# Patient Record
Sex: FEMALE | Race: BLACK OR AFRICAN AMERICAN | NOT HISPANIC OR LATINO | Employment: FULL TIME | URBAN - METROPOLITAN AREA
[De-identification: names, ages, dates, MRNs, and addresses within clinical notes are randomized per-mention and may not be internally consistent; named-entity substitution may affect disease eponyms.]

---

## 2017-01-24 ENCOUNTER — OFFICE VISIT (OUTPATIENT)
Dept: FAMILY MEDICINE | Facility: CLINIC | Age: 57
End: 2017-01-24
Payer: COMMERCIAL

## 2017-01-24 ENCOUNTER — LAB VISIT (OUTPATIENT)
Dept: LAB | Facility: HOSPITAL | Age: 57
End: 2017-01-24
Attending: FAMILY MEDICINE
Payer: COMMERCIAL

## 2017-01-24 VITALS
RESPIRATION RATE: 20 BRPM | HEART RATE: 119 BPM | SYSTOLIC BLOOD PRESSURE: 150 MMHG | HEIGHT: 63 IN | DIASTOLIC BLOOD PRESSURE: 86 MMHG | TEMPERATURE: 98 F | OXYGEN SATURATION: 97 % | BODY MASS INDEX: 42.15 KG/M2 | WEIGHT: 237.88 LBS

## 2017-01-24 DIAGNOSIS — I50.9 CHRONIC CONGESTIVE HEART FAILURE, UNSPECIFIED CONGESTIVE HEART FAILURE TYPE: ICD-10-CM

## 2017-01-24 DIAGNOSIS — M25.512 ACUTE PAIN OF LEFT SHOULDER: ICD-10-CM

## 2017-01-24 DIAGNOSIS — Z79.4 TYPE 2 DIABETES MELLITUS WITH RETINOPATHY WITHOUT MACULAR EDEMA, WITH LONG-TERM CURRENT USE OF INSULIN, UNSPECIFIED LATERALITY, UNSPECIFIED RETINOPATHY SEVERITY: Chronic | ICD-10-CM

## 2017-01-24 DIAGNOSIS — I10 ESSENTIAL HYPERTENSION: Primary | Chronic | ICD-10-CM

## 2017-01-24 DIAGNOSIS — E11.69 COMBINED HYPERLIPIDEMIA ASSOCIATED WITH TYPE 2 DIABETES MELLITUS: Chronic | ICD-10-CM

## 2017-01-24 DIAGNOSIS — E11.319 TYPE 2 DIABETES MELLITUS WITH RETINOPATHY WITHOUT MACULAR EDEMA, WITH LONG-TERM CURRENT USE OF INSULIN, UNSPECIFIED LATERALITY, UNSPECIFIED RETINOPATHY SEVERITY: Chronic | ICD-10-CM

## 2017-01-24 DIAGNOSIS — E78.2 COMBINED HYPERLIPIDEMIA ASSOCIATED WITH TYPE 2 DIABETES MELLITUS: Chronic | ICD-10-CM

## 2017-01-24 DIAGNOSIS — E87.6 HYPOKALEMIA: ICD-10-CM

## 2017-01-24 PROCEDURE — 99214 OFFICE O/P EST MOD 30 MIN: CPT | Mod: S$GLB,,, | Performed by: FAMILY MEDICINE

## 2017-01-24 PROCEDURE — 3077F SYST BP >= 140 MM HG: CPT | Mod: S$GLB,,, | Performed by: FAMILY MEDICINE

## 2017-01-24 PROCEDURE — 2022F DILAT RTA XM EVC RTNOPTHY: CPT | Mod: S$GLB,,, | Performed by: FAMILY MEDICINE

## 2017-01-24 PROCEDURE — 36415 COLL VENOUS BLD VENIPUNCTURE: CPT | Mod: PO

## 2017-01-24 PROCEDURE — 99999 PR PBB SHADOW E&M-EST. PATIENT-LVL III: CPT | Mod: PBBFAC,,, | Performed by: FAMILY MEDICINE

## 2017-01-24 PROCEDURE — 1159F MED LIST DOCD IN RCRD: CPT | Mod: S$GLB,,, | Performed by: FAMILY MEDICINE

## 2017-01-24 PROCEDURE — 83036 HEMOGLOBIN GLYCOSYLATED A1C: CPT

## 2017-01-24 PROCEDURE — 3046F HEMOGLOBIN A1C LEVEL >9.0%: CPT | Mod: S$GLB,,, | Performed by: FAMILY MEDICINE

## 2017-01-24 PROCEDURE — 3079F DIAST BP 80-89 MM HG: CPT | Mod: S$GLB,,, | Performed by: FAMILY MEDICINE

## 2017-01-24 PROCEDURE — 4010F ACE/ARB THERAPY RXD/TAKEN: CPT | Mod: S$GLB,,, | Performed by: FAMILY MEDICINE

## 2017-01-24 RX ORDER — AMLODIPINE BESYLATE 10 MG/1
10 TABLET ORAL DAILY
Qty: 30 TABLET | Refills: 5 | Status: CANCELLED | OUTPATIENT
Start: 2017-01-24 | End: 2017-07-23

## 2017-01-24 RX ORDER — FUROSEMIDE 40 MG/1
40 TABLET ORAL 2 TIMES DAILY
Qty: 60 TABLET | Refills: 5 | Status: SHIPPED | OUTPATIENT
Start: 2017-01-24 | End: 2017-04-27 | Stop reason: SDUPTHER

## 2017-01-24 RX ORDER — AMLODIPINE BESYLATE 10 MG/1
10 TABLET ORAL DAILY
Qty: 30 TABLET | Refills: 5 | Status: SHIPPED | OUTPATIENT
Start: 2017-01-24 | End: 2017-04-27 | Stop reason: SDUPTHER

## 2017-01-24 RX ORDER — LOSARTAN POTASSIUM 100 MG/1
100 TABLET ORAL DAILY
Qty: 30 TABLET | Refills: 5 | Status: SHIPPED | OUTPATIENT
Start: 2017-01-24 | End: 2017-01-24 | Stop reason: SDUPTHER

## 2017-01-24 RX ORDER — CLONIDINE HYDROCHLORIDE 0.1 MG/1
0.1 TABLET ORAL 2 TIMES DAILY
Qty: 60 TABLET | Refills: 5 | Status: SHIPPED | OUTPATIENT
Start: 2017-01-24 | End: 2017-04-27 | Stop reason: SDUPTHER

## 2017-01-24 RX ORDER — LOSARTAN POTASSIUM 100 MG/1
100 TABLET ORAL DAILY
Qty: 30 TABLET | Refills: 5 | Status: SHIPPED | OUTPATIENT
Start: 2017-01-24 | End: 2017-04-27 | Stop reason: SDUPTHER

## 2017-01-24 RX ORDER — ATORVASTATIN CALCIUM 40 MG/1
40 TABLET, FILM COATED ORAL DAILY
Qty: 30 TABLET | Refills: 2 | Status: SHIPPED | OUTPATIENT
Start: 2017-01-24 | End: 2017-04-27 | Stop reason: SDUPTHER

## 2017-01-24 RX ORDER — ATORVASTATIN CALCIUM 40 MG/1
TABLET, FILM COATED ORAL
COMMUNITY
Start: 2016-12-26 | End: 2017-01-24 | Stop reason: SDUPTHER

## 2017-01-24 RX ORDER — CARVEDILOL 6.25 MG/1
6.25 TABLET ORAL NIGHTLY
Qty: 30 TABLET | Refills: 5 | Status: SHIPPED | OUTPATIENT
Start: 2017-01-24 | End: 2017-04-27 | Stop reason: SDUPTHER

## 2017-01-24 RX ORDER — GABAPENTIN 800 MG/1
800 TABLET ORAL 2 TIMES DAILY
Qty: 60 TABLET | Refills: 0 | Status: SHIPPED | OUTPATIENT
Start: 2017-01-24 | End: 2017-02-27 | Stop reason: SDUPTHER

## 2017-01-24 RX ORDER — NORTRIPTYLINE HYDROCHLORIDE 25 MG/1
25 CAPSULE ORAL NIGHTLY
Qty: 30 CAPSULE | Refills: 5 | Status: SHIPPED | OUTPATIENT
Start: 2017-01-24 | End: 2017-04-27 | Stop reason: SDUPTHER

## 2017-01-24 RX ORDER — TRAMADOL HYDROCHLORIDE 50 MG/1
50 TABLET ORAL 2 TIMES DAILY PRN
Qty: 60 TABLET | Refills: 2 | Status: SHIPPED | OUTPATIENT
Start: 2017-01-24 | End: 2017-04-27 | Stop reason: SDUPTHER

## 2017-01-24 RX ORDER — POTASSIUM CHLORIDE 20 MEQ/1
20 TABLET, EXTENDED RELEASE ORAL 2 TIMES DAILY
Qty: 60 TABLET | Refills: 5 | Status: SHIPPED | OUTPATIENT
Start: 2017-01-24 | End: 2017-04-27 | Stop reason: SDUPTHER

## 2017-01-24 RX ORDER — FUROSEMIDE 40 MG/1
40 TABLET ORAL 2 TIMES DAILY
Qty: 60 TABLET | Refills: 5 | Status: SHIPPED | OUTPATIENT
Start: 2017-01-24 | End: 2017-01-24 | Stop reason: SDUPTHER

## 2017-01-24 NOTE — MR AVS SNAPSHOT
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, with long-term current use of insulin, unspecified laterality, unspecified retinopathy severity         Chronic congestive heart failure, unspecified congestive heart failure type         Type 2 diabetes mellitus, uncontrolled, with neuropathy         Acute pain of left shoulder         Hypokalemia         Combined hyperlipidemia associated with type 2 diabetes mellitus                To Do List           Goals (5 Years of Data)              8/3/16    3/3/16    9/10/15    HEMOGLOBIN A1C < 7.0   10.3  9.6  9.5      Follow-Up and Disposition     Return in about 2 weeks (around 2017) for Follow up.       These Medications        Disp Refills Start End    carvedilol (COREG) 6.25 MG tablet 30 tablet 5 2017     Take 1 tablet (6.25 mg total) by mouth every evening. - Oral    Pharmacy: [facility] SANZ [address] Ph #: [phone]       losartan (COZAAR) 100 MG tablet 30 tablet 5 2017     Take 1 tablet (100 mg total) by mouth once daily. - Oral    Pharmacy: [facility] RACHID SANZ [address] Ph #: [phone]       tramadol (ULTRAM) 50 mg tablet 60 tablet 2 2017     Take 1 tablet (50 mg total) by mouth 2 (two) times daily as needed. - Oral    Pharmacy: [facility] RACHID SANZ [address] Ph #: [phone]       cloNIDine (CATAPRES) 0.1 MG tablet 60 tablet 5 2017    Take 1 tablet (0.1 mg  total) by mouth 2 (two) times daily. - Oral    Pharmacy: [address] Ph #: [phone]       nortriptyline (PAMELOR) 25 MG capsule 30 capsule 5 1/24/2017 7/23/2017    Take 1 capsule (25 mg total) by mouth every evening. - Oral    Pharmacy: [address] Ph #: [phone]       gabapentin (NEURONTIN) 800 MG tablet 60 tablet 0 1/24/2017     Take 1 tablet (800 mg total) by mouth 2 (two) times daily. - Oral    Pharmacy: [address] Ph #: [phone]       potassium chloride SA (K-DUR,KLOR-CON) 20 MEQ tablet 60 tablet 5 1/24/2017 7/23/2017    Take 1 tablet (20 mEq total) by mouth 2 (two) times daily. - Oral    Pharmacy: [address] Ph #: [phone]       furosemide (LASIX) 40 MG tablet 60 tablet 5 1/24/2017     Take 1 tablet (40 mg total) by mouth 2 (two) times daily. - Oral    Pharmacy: [address] Ph #: [phone]       atorvastatin (LIPITOR) 40 MG tablet 30 tablet 2 1/24/2017     Take 1 tablet (40 mg total) by mouth once daily. - Oral    Pharmacy: [address] Ph #: [phone]       amlodipine (NORVASC) 10 MG tablet 30 tablet 5 1/24/2017 7/23/2017    Take 1 tablet (10 mg total) by mouth once daily. - Oral    Pharmacy: [address] Ph #: [phone]         [facility] On Call     [facility] On Call Nurse Care Line - 24/7 Assistance  Registered nurses in the [facility] On Call Center provide clinical advisement, health education, appointment booking, and other advisory services.  Call for this free service at [phone].             Medications           Message regarding Medications     Verify the changes and/or additions to your medication regime listed below are the same as discussed with your  clinician today.  If any of these changes or additions are incorrect, please notify your healthcare provider.        START taking these NEW medications        Refills    atorvastatin (LIPITOR) 40 MG tablet 2    Sig: Take 1 tablet (40 mg total) by mouth once daily.    Class: Normal    Route: Oral      CHANGE how you are taking these medications     Start Taking Instead of    gabapentin (NEURONTIN) 800 MG tablet gabapentin (NEURONTIN) 800 MG tablet    Dosage:  Take 1 tablet (800 mg total) by mouth 2 (two) times daily. Dosage:  TAKE ONE TABLET BY MOUTH TWICE DAILY    Reason for Change:  Reorder       STOP taking these medications     benzonatate (TESSALON) 200 MG capsule     budesonide-formoterol 160-4.5 mcg (SYMBICORT) 160-4.5 mcg/actuation HFAA Inhale 2 puffs into the lungs every 12 (twelve) hours.    ciprofloxacin HCl (CIPRO) 500 MG tablet     fluticasone-salmeterol 250-50 mcg/dose (ADVAIR DISKUS) 250-50 mcg/dose diskus inhaler Inhale 1 puff into the lungs 2 (two) times daily as needed.     insulin glargine (LANTUS SOLOSTAR) 100 unit/mL (3 mL) InPn pen Inject 80 Units into the skin every evening.    levalbuterol (XOPENEX) 1.25 mg/3 mL nebulizer solution Inhale into the lungs.    multivitamin (THERAGRAN) per tablet Take 1 tablet by mouth once daily.            Verify that the below list of medications is an accurate representation of the medications you are currently taking.  If none reported, the list may be blank. If incorrect, please contact your healthcare provider. Carry this list with you in case of emergency.           Current Medications     albuterol (PROVENTIL HFA/VENTOLIN HFA) 200 puff inhaler Inhale 2 puffs into the lungs every 6 (six) hours as needed.      amlodipine (NORVASC) 10 MG tablet Take 1 tablet (10 mg total) by mouth once daily.    ferrous sulfate 325 mg (65 mg iron) Tab Take 1 tablet by mouth once daily.     insulin aspart (NOVOLOG) 100 unit/mL injection Inject 12 Units into the skin 3 (three)  "times daily before meals.    insulin regular 100 unit/mL Inj injection Inject 25 Units into the skin as needed.    atorvastatin (LIPITOR) 40 MG tablet Take 1 tablet (40 mg total) by mouth once daily.    carvedilol (COREG) 6.25 MG tablet Take 1 tablet (6.25 mg total) by mouth every evening.    cloNIDine (CATAPRES) 0.1 MG tablet Take 1 tablet (0.1 mg total) by mouth 2 (two) times daily.    furosemide (LASIX) 40 MG tablet Take 1 tablet (40 mg total) by mouth 2 (two) times daily.    gabapentin (NEURONTIN) 800 MG tablet Take 1 tablet (800 mg total) by mouth 2 (two) times daily.    losartan (COZAAR) 100 MG tablet Take 1 tablet (100 mg total) by mouth once daily.    nortriptyline (PAMELOR) 25 MG capsule Take 1 capsule (25 mg total) by mouth every evening.    potassium chloride SA (K-DUR,KLOR-CON) 20 MEQ tablet Take 1 tablet (20 mEq total) by mouth 2 (two) times daily.    tramadol (ULTRAM) 50 mg tablet Take 1 tablet (50 mg total) by mouth 2 (two) times daily as needed.           Clinical Reference Information           Vital Signs - Last Recorded  Most recent update: 1/24/2017  3:12 PM by Ela Yo MA    BP Pulse Temp Resp Ht Wt    (!) 150/86 (BP Location: Left arm, Patient Position: Sitting, BP Method: Manual) (!) 119 98.2 °F (36.8 °C) (Oral) 20 5' 3" (1.6 m) 107.9 kg (237 lb 14 oz)    LMP SpO2 BMI          11/04/2013 97% 42.14 kg/m2        Blood Pressure          Most Recent Value    BP  (!)  150/86      Allergies as of 1/24/2017     Lisinopril      Immunizations Administered on Date of Encounter - 1/24/2017     None      Orders Placed During Today's Visit     Future Labs/Procedures Expected by Expires    Hemoglobin A1c  1/24/2017 3/25/2018      [facility] Sign-Up     Activating your [facility] account is as easy as 1-2-3!     1) Visit [email or web address], select Sign Up Now, enter this activation code and your date of birth, then select Next.  [email or web address]  Expires: 3/10/2017  3:35 PM      2) Create a username " and password to use when you visit [facility] in the future and select a security question in case you lose your password and select Next.    3) Enter your e-mail address and click Sign Up!    Additional Information  If you have questions, please e-mail [email or web address] or call [phone] to talk to our [facility] staff. Remember, [facility] is NOT to be used for urgent needs. For medical emergencies, dial 911.

## 2017-01-25 LAB
ESTIMATED AVG GLUCOSE: 269 MG/DL
HBA1C MFR BLD HPLC: 11 %

## 2017-01-25 NOTE — PROGRESS NOTES
Chief Complaint   Patient presents with    Establish Care    Hypertension    Medication Refill       HPI  Michelle Donohue is a 56 y.o. female with multiple medical diagnoses as listed in the medical history and problem list that presents for establishment of care . She has diabetes and hypertension. She has been having pains in her left shoulder and thinks this might be elevating her blood pressure. She has not checked her sugars in a while because she was taking tresiba and not getting improvement. She tries to avoid sugars in her diet. She is currently taking insulin. She does not exercise and she has been having trouble with her heart failure since her brother . She has managed this on her own by increasing her lasix as needed. She has some swelling in her right neck.    PAST MEDICAL HISTORY:  Past Medical History   Diagnosis Date    Asthma     Diabetes mellitus, type 2     Diabetic neuropathy     Diabetic retinopathy     Epiretinal membrane, left eye     Heart failure     Hyperlipidemia     Hypertension     Retinal detachment     Sickle cell trait     Vitreous hemorrhage of both eyes        PAST SURGICAL HISTORY:  Past Surgical History   Procedure Laterality Date    Breast lumpectomy       right breast    Btl      Panretinal photocoagulation       Both eyes    Avastin injection Bilateral     Cataract extraction w/  intraocular lens implant Left 3/25/2015     OS (Dr. Montes)    Cataract extraction w/  intraocular lens implant Right 6/17/15     OD ()       SOCIAL HISTORY:  Social History     Social History    Marital status: Single     Spouse name: N/A    Number of children: 2    Years of education: N/A     Occupational History          Social History Main Topics    Smoking status: Never Smoker    Smokeless tobacco: Never Used    Alcohol use No    Drug use: No    Sexual activity: Yes     Partners: Male      Comment: . 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     Other Topics Concern    Not on file     Social History Narrative       FAMILY HISTORY:  Family History   Problem Relation Age of Onset    Heart failure Father 60         Hyperlipidemia Father 60         Hypertension Father 60         Diabetes Father 60         Diabetes Mother     Hypertension Mother     Diabetes Brother     Heart failure Brother     Hyperlipidemia Brother     Hypertension Brother        ALLERGIES AND MEDICATIONS: updated and reviewed.  Review of patient's allergies indicates:   Allergen Reactions    Lisinopril Other (See Comments)     cough     Current Outpatient Prescriptions   Medication Sig Dispense Refill    albuterol (PROVENTIL HFA/VENTOLIN HFA) 200 puff inhaler Inhale 2 puffs into the lungs every 6 (six) hours as needed.        amlodipine (NORVASC) 10 MG tablet Take 1 tablet (10 mg total) by mouth once daily. 30 tablet 5    ferrous sulfate 325 mg (65 mg iron) Tab Take 1 tablet by mouth once daily.       insulin aspart (NOVOLOG) 100 unit/mL injection Inject 12 Units into the skin 3 (three) times daily before meals. 10.8 mL 11    insulin regular 100 unit/mL Inj injection Inject 25 Units into the skin as needed. 10 mL 5    atorvastatin (LIPITOR) 40 MG tablet Take 1 tablet (40 mg total) by mouth once daily. 30 tablet 2    carvedilol (COREG) 6.25 MG tablet Take 1 tablet (6.25 mg total) by mouth every evening. 30 tablet 5    cloNIDine (CATAPRES) 0.1 MG tablet Take 1 tablet (0.1 mg total) by mouth 2 (two) times daily. 60 tablet 5    furosemide (LASIX) 40 MG tablet Take 1 tablet (40 mg total) by mouth 2 (two) times daily. 60 tablet 5    gabapentin (NEURONTIN) 800 MG tablet Take 1 tablet (800 mg total) by mouth 2 (two) times daily. 60 tablet 0    losartan (COZAAR) 100 MG tablet Take 1 tablet (100 mg total) by mouth once daily. 30 tablet 5    nortriptyline (PAMELOR) 25 MG capsule Take 1 capsule (25 mg total) by mouth every evening. 30  "capsule 5    potassium chloride SA (K-DUR,KLOR-CON) 20 MEQ tablet Take 1 tablet (20 mEq total) by mouth 2 (two) times daily. 60 tablet 5    tramadol (ULTRAM) 50 mg tablet Take 1 tablet (50 mg total) by mouth 2 (two) times daily as needed. 60 tablet 2     Current Facility-Administered Medications   Medication Dose Route Frequency Provider Last Rate Last Dose    albuterol nebulizer solution 2.5 mg  2.5 mg Nebulization 1 time in Clinic/HOD RONY Woo MD           ROS  Review of Systems   Constitutional: Negative for chills, diaphoresis, fatigue, fever and unexpected weight change.   HENT: Negative for rhinorrhea, sinus pressure, sore throat and tinnitus.    Eyes: Negative for photophobia and visual disturbance.   Respiratory: Negative for cough, shortness of breath and wheezing.    Cardiovascular: Negative for chest pain and palpitations.   Gastrointestinal: Negative for abdominal pain, blood in stool, constipation, diarrhea, nausea and vomiting.   Genitourinary: Negative for dysuria, flank pain, frequency and vaginal discharge.   Musculoskeletal: Positive for arthralgias. Negative for joint swelling.   Skin: Negative for rash.   Neurological: Negative for speech difficulty, weakness, light-headedness and headaches.   Psychiatric/Behavioral: Negative for behavioral problems and dysphoric mood.       Physical Exam  Vitals:    01/24/17 1511   BP: (!) 150/86   Pulse: (!) 119   Resp: 20   Temp: 98.2 °F (36.8 °C)    Body mass index is 42.14 kg/(m^2).  Weight: 107.9 kg (237 lb 14 oz)   Height: 5' 3" (160 cm)     Physical Exam   Constitutional: She is oriented to person, place, and time. She appears well-developed and well-nourished. No distress.   HENT:   Area of firmness on right neck, no JVD present   Eyes: EOM are normal.   Neck: Neck supple.   Cardiovascular: Normal rate and regular rhythm.  Exam reveals no gallop and no friction rub.    No murmur heard.  Pulmonary/Chest: Effort normal and breath sounds " normal. No respiratory distress. She has no wheezes. She has no rales.   Lymphadenopathy:     She has no cervical adenopathy.   Neurological: She is alert and oriented to person, place, and time.   Skin: Skin is warm and dry. No rash noted.   Psychiatric: She has a normal mood and affect. Her behavior is normal.   Nursing note and vitals reviewed.      Health Maintenance       Date Due Completion Date    Fecal Occult Blood Test (FOBT) 1960 ---    Pneumococcal PCV13 (High Risk) (1 - PCV13 Required) 2/27/1961 ---    TETANUS VACCINE 2/27/1979 ---    Pneumococcal PPSV23 (High Risk) (1) 2/27/1979 ---    Pap Smear 2/27/1982 ---    Mammogram 8/19/2015 8/19/2014    Hemoglobin A1c 11/3/2016 8/3/2016    Eye Exam 2/10/2017 2/10/2016 (Done)    Override on 2/10/2016: Done    Foot Exam 8/3/2017 8/3/2016    Lipid Panel 8/3/2017 8/3/2016    Urine Microalbumin 8/4/2017 8/4/2016    Colonoscopy 8/13/2024 8/13/2014 (Declined)    Override on 8/13/2014: Declined            ASSESSMENT     1. Essential hypertension    2. 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 Chronic congestive heart failure, unspecified congestive heart failure type    4. Type 2 diabetes mellitus, uncontrolled, with neuropathy    5. Acute pain of left shoulder    6. Hypokalemia    7. Combined hyperlipidemia associated with type 2 diabetes mellitus        PLAN:     Essential hypertension  -     carvedilol (COREG) 6.25 MG tablet; Take 1 tablet (6.25 mg total) by mouth every evening.  Dispense: 30 tablet; Refill: 5  -     Discontinue: losartan (COZAAR) 100 MG tablet; Take 1 tablet (100 mg total) by mouth once daily.  Dispense: 30 tablet; Refill: 5  -     cloNIDine (CATAPRES) 0.1 MG tablet; Take 1 tablet (0.1 mg total) by mouth 2 (two) times daily.  Dispense: 60 tablet; Refill: 5  -     amlodipine (NORVASC) 10 MG tablet; Take 1 tablet (10 mg  total) by mouth once daily.  Dispense: 30 tablet; Refill: 5  -     losartan (COZAAR) 100 MG tablet; Take 1 tablet (100 mg total) by mouth once daily.  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  Dispense: 30 tablet; Refill: 5  -     Discontinue: furosemide (LASIX) 40 MG tablet; Take 1 tablet (40 mg total) by mouth 2 (two) times daily.  Dispense: 60 tablet; Refill: 5  -     furosemide (LASIX) 40 MG tablet; Take 1 tablet (40 mg total) by mouth 2 (two) times daily.  Dispense: 60 tablet; Refill: 5    Type 2 diabetes mellitus, uncontrolled, with neuropathy  -     tramadol (ULTRAM) 50 mg tablet; Take 1 tablet (50 mg total) by mouth 2 (two) times daily as needed.  Dispense: 60 tablet; Refill: 2  -     nortriptyline (PAMELOR) 25 MG capsule; Take 1 capsule (25 mg total) by mouth every evening.  Dispense: 30 capsule; Refill: 5  -     gabapentin (NEURONTIN) 800 MG tablet; Take 1 tablet (800 mg total) by mouth 2 (two) times daily.  Dispense: 60 tablet; Refill: 0    Acute pain of left shoulder  -     tramadol (ULTRAM) 50 mg tablet; Take 1 tablet (50 mg total) by mouth 2 (two) times daily as needed.  Dispense: 60 tablet; Refill: 2    Hypokalemia  -     potassium chloride SA (K-DUR,KLOR-CON) 20 MEQ tablet; Take 1 tablet (20 mEq total) by mouth 2 (two) times daily.  Dispense: 60 tablet; Refill: 5    Combined hyperlipidemia associated with type 2 diabetes mellitus      Recheck A1C, begin checking sugars once daily may get nutrition consult  Will recheck BP at next visit, avoid salt, begin exercising 30 min three to five times weekly  Continue to monitor neck  swelling for JVD, none noted today, possibly LAD        Rosalie Brady MD  01/25/2017 1:12 PM        Return in about 2 weeks (around 2/7/2017) for Follow up.

## 2017-03-01 RX ORDER — GABAPENTIN 800 MG/1
TABLET ORAL
Qty: 60 TABLET | Refills: 0 | Status: SHIPPED | OUTPATIENT
Start: 2017-03-01 | End: 2017-03-26 | Stop reason: SDUPTHER

## 2017-03-27 RX ORDER — GABAPENTIN 800 MG/1
TABLET ORAL
Qty: 60 TABLET | Refills: 0 | Status: SHIPPED | OUTPATIENT
Start: 2017-03-27 | End: 2017-04-27 | Stop reason: SDUPTHER

## 2017-03-27 NOTE — TELEPHONE ENCOUNTER
Please make her an appt as she is overdue for follow up for diabetes and labwork that has already been ordered    I can give a month supply but she is overdue for a visit

## 2017-04-12 ENCOUNTER — PATIENT OUTREACH (OUTPATIENT)
Dept: ADMINISTRATIVE | Facility: HOSPITAL | Age: 57
End: 2017-04-12

## 2017-04-12 NOTE — PROGRESS NOTES
Left message for patient to call our office.    This call is in regards to scheduling the patient w/ Endocrinology NP Patricia Alexandra.

## 2017-04-27 ENCOUNTER — PATIENT MESSAGE (OUTPATIENT)
Dept: FAMILY MEDICINE | Facility: CLINIC | Age: 57
End: 2017-04-27

## 2017-04-27 ENCOUNTER — OFFICE VISIT (OUTPATIENT)
Dept: FAMILY MEDICINE | Facility: CLINIC | Age: 57
End: 2017-04-27
Payer: COMMERCIAL

## 2017-04-27 ENCOUNTER — HOSPITAL ENCOUNTER (OUTPATIENT)
Dept: RADIOLOGY | Facility: HOSPITAL | Age: 57
Discharge: HOME | End: 2017-04-27
Attending: FAMILY MEDICINE
Payer: COMMERCIAL

## 2017-04-27 VITALS
HEART RATE: 110 BPM | OXYGEN SATURATION: 97 % | RESPIRATION RATE: 20 BRPM | TEMPERATURE: 98 F | SYSTOLIC BLOOD PRESSURE: 120 MMHG | HEIGHT: 63 IN | BODY MASS INDEX: 42.09 KG/M2 | DIASTOLIC BLOOD PRESSURE: 66 MMHG | WEIGHT: 237.56 LBS

## 2017-04-27 DIAGNOSIS — I10 ESSENTIAL HYPERTENSION: Chronic | ICD-10-CM

## 2017-04-27 DIAGNOSIS — M79.604 RIGHT LEG PAIN: ICD-10-CM
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
## 2017-04-27 DIAGNOSIS — E87.6 HYPOKALEMIA: ICD-10-CM

## 2017-04-27 DIAGNOSIS — E11.65 UNCONTROLLED TYPE 2 DIABETES MELLITUS WITH STAGE 3 CHRONIC KIDNEY DISEASE, UNSPECIFIED LONG TERM INSULIN USE STATUS: ICD-10-CM

## 2017-04-27 DIAGNOSIS — N18.3 UNCONTROLLED TYPE 2 DIABETES MELLITUS WITH STAGE 3 CHRONIC KIDNEY DISEASE, UNSPECIFIED LONG TERM INSULIN USE STATUS: ICD-10-CM
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
## 2017-04-27 DIAGNOSIS — E11.22 UNCONTROLLED TYPE 2 DIABETES MELLITUS WITH STAGE 3 CHRONIC KIDNEY DISEASE, UNSPECIFIED LONG TERM INSULIN USE STATUS: ICD-10-CM

## 2017-04-27 DIAGNOSIS — Z79.4 INSULIN LONG-TERM USE: Chronic | ICD-10-CM

## 2017-04-27 DIAGNOSIS — T14.8XXA SKIN ABRASION: ICD-10-CM

## 2017-04-27 DIAGNOSIS — M25.512 ACUTE PAIN OF LEFT SHOULDER: ICD-10-CM

## 2017-04-27 DIAGNOSIS — E11.65 UNCONTROLLED TYPE 2 DIABETES MELLITUS WITH COMPLICATION, UNSPECIFIED LONG TERM INSULIN USE STATUS: ICD-10-CM

## 2017-04-27 DIAGNOSIS — E11.8 UNCONTROLLED TYPE 2 DIABETES MELLITUS WITH COMPLICATION, UNSPECIFIED LONG TERM INSULIN USE STATUS: ICD-10-CM

## 2017-04-27 DIAGNOSIS — I50.9 CHRONIC CONGESTIVE HEART FAILURE, UNSPECIFIED CONGESTIVE HEART FAILURE TYPE: ICD-10-CM

## 2017-04-27 PROCEDURE — 3074F SYST BP LT 130 MM HG: CPT | Mod: S$GLB,,, | Performed by: FAMILY MEDICINE

## 2017-04-27 PROCEDURE — 3078F DIAST BP <80 MM HG: CPT | Mod: S$GLB,,, | Performed by: FAMILY MEDICINE

## 2017-04-27 PROCEDURE — 99999 PR PBB SHADOW E&M-EST. PATIENT-LVL V: CPT | Mod: PBBFAC,,, | Performed by: FAMILY MEDICINE

## 2017-04-27 PROCEDURE — 99214 OFFICE O/P EST MOD 30 MIN: CPT | Mod: S$GLB,,, | Performed by: FAMILY MEDICINE

## 2017-04-27 PROCEDURE — 4010F ACE/ARB THERAPY RXD/TAKEN: CPT | Mod: S$GLB,,, | Performed by: FAMILY MEDICINE

## 2017-04-27 PROCEDURE — 73590 X-RAY EXAM OF LOWER LEG: CPT | Mod: 26,RT,, | Performed by: RADIOLOGY

## 2017-04-27 PROCEDURE — 1160F RVW MEDS BY RX/DR IN RCRD: CPT | Mod: S$GLB,,, | Performed by: FAMILY MEDICINE

## 2017-04-27 PROCEDURE — 73590 X-RAY EXAM OF LOWER LEG: CPT | Mod: TC,PO,RT

## 2017-04-27 PROCEDURE — 3046F HEMOGLOBIN A1C LEVEL >9.0%: CPT | Mod: S$GLB,,, | Performed by: FAMILY MEDICINE

## 2017-04-27 RX ORDER — NORTRIPTYLINE HYDROCHLORIDE 25 MG/1
25 CAPSULE ORAL NIGHTLY
Qty: 30 CAPSULE | Refills: 5 | Status: SHIPPED | OUTPATIENT
Start: 2017-04-27 | End: 2017-10-25 | Stop reason: SDUPTHER

## 2017-04-27 RX ORDER — TRAMADOL HYDROCHLORIDE 50 MG/1
50 TABLET ORAL 2 TIMES DAILY PRN
Qty: 60 TABLET | Refills: 2 | Status: SHIPPED | OUTPATIENT
Start: 2017-04-27 | End: 2017-08-30 | Stop reason: SDUPTHER

## 2017-04-27 RX ORDER — CARVEDILOL 6.25 MG/1
6.25 TABLET ORAL NIGHTLY
Qty: 30 TABLET | Refills: 5 | Status: SHIPPED | OUTPATIENT
Start: 2017-04-27 | End: 2017-10-25 | Stop reason: SDUPTHER

## 2017-04-27 RX ORDER — INSULIN ASPART 100 [IU]/ML
12 INJECTION, SOLUTION INTRAVENOUS; SUBCUTANEOUS
Qty: 10.8 ML | Refills: 11 | Status: SHIPPED | OUTPATIENT
Start: 2017-04-27 | End: 2018-03-09 | Stop reason: ALTCHOICE

## 2017-04-27 RX ORDER — FUROSEMIDE 40 MG/1
40 TABLET ORAL 2 TIMES DAILY
Qty: 60 TABLET | Refills: 5 | Status: SHIPPED | OUTPATIENT
Start: 2017-04-27 | End: 2017-10-25 | Stop reason: SDUPTHER

## 2017-04-27 RX ORDER — AMLODIPINE BESYLATE 10 MG/1
10 TABLET ORAL DAILY
Qty: 30 TABLET | Refills: 5 | Status: SHIPPED | OUTPATIENT
Start: 2017-04-27 | End: 2017-10-25 | Stop reason: SDUPTHER

## 2017-04-27 RX ORDER — MUPIROCIN 20 MG/G
OINTMENT TOPICAL 3 TIMES DAILY
Qty: 22 G | Refills: 0 | Status: SHIPPED | OUTPATIENT
Start: 2017-04-27 | End: 2017-08-30

## 2017-04-27 RX ORDER — FERROUS SULFATE 325(65) MG
1 TABLET ORAL DAILY
Qty: 30 TABLET | Refills: 0 | Status: SHIPPED | OUTPATIENT
Start: 2017-04-27 | End: 2019-02-01

## 2017-04-27 RX ORDER — ATORVASTATIN CALCIUM 40 MG/1
40 TABLET, FILM COATED ORAL DAILY
Qty: 30 TABLET | Refills: 5 | Status: SHIPPED | OUTPATIENT
Start: 2017-04-27 | End: 2017-10-25 | Stop reason: SDUPTHER

## 2017-04-27 RX ORDER — GABAPENTIN 800 MG/1
800 TABLET ORAL 2 TIMES DAILY
Qty: 60 TABLET | Refills: 5 | Status: SHIPPED | OUTPATIENT
Start: 2017-04-27 | End: 2017-10-25 | Stop reason: SDUPTHER

## 2017-04-27 RX ORDER — CLONIDINE HYDROCHLORIDE 0.1 MG/1
0.1 TABLET ORAL 2 TIMES DAILY
Qty: 60 TABLET | Refills: 5 | Status: SHIPPED | OUTPATIENT
Start: 2017-04-27 | End: 2018-05-09 | Stop reason: SDUPTHER

## 2017-04-27 RX ORDER — POTASSIUM CHLORIDE 20 MEQ/1
20 TABLET, EXTENDED RELEASE ORAL 2 TIMES DAILY
Qty: 60 TABLET | Refills: 5 | Status: SHIPPED | OUTPATIENT
Start: 2017-04-27 | End: 2017-10-25 | Stop reason: SDUPTHER

## 2017-04-27 RX ORDER — LOSARTAN POTASSIUM 100 MG/1
100 TABLET ORAL DAILY
Qty: 30 TABLET | Refills: 5 | Status: SHIPPED | OUTPATIENT
Start: 2017-04-27 | End: 2017-10-25 | Stop reason: SDUPTHER

## 2017-04-27 NOTE — MR AVS SNAPSHOT
[facility]  [address]  [place] [address]  Phone: [phone]  Fax: [phone]                  Michelle Donohue   2017 10:20 AM   Office Visit    Description:  Female : 1960   Provider:  Rosalie Brady MD   Department:  [facility]           Reason for Visit     Fall     Diabetes           Diagnoses this Visit        Comments    Fall, initial encounter    -  Primary     Right leg pain         Essential hypertension         Chronic congestive heart failure, unspecified congestive heart failure type         Type 2 diabetes mellitus, uncontrolled, with neuropathy         Uncontrolled type 2 diabetes mellitus with complication, unspecified long term insulin use status         Uncontrolled type 2 diabetes mellitus with stage 3 chronic kidney disease, unspecified long term insulin use status         Insulin long-term use         Hypokalemia         Acute pain of left shoulder         Skin abrasion                To Do List           Goals (5 Years of Data)              1/24/17    8/3/16    3/3/16    HEMOGLOBIN A1C < 7.0   11.0  10.3  9.6      Follow-Up and Disposition     Return for Follow up.       These Medications        Disp Refills Start End    cloNIDine (CATAPRES) 0.1 MG tablet 60 tablet 5 2017 10/24/2017    Take 1 tablet (0.1 mg total) by mouth 2 (two) times daily. - Oral    Pharmacy: [facility] [address] [place] [address] [address] Ph #: [phone]       amlodipine (NORVASC) 10 MG tablet 30 tablet 5 2017 10/24/2017    Take 1 tablet (10 mg total) by mouth once daily. - Oral    Pharmacy: [facility] [place] [address] Ph #: [phone]       atorvastatin (LIPITOR) 40 MG tablet 30 tablet 5 2017     Take 1 tablet (40 mg total) by mouth once daily. - Oral    Pharmacy: [facility] [place] [address] Ph #: [phone]       carvedilol (COREG) 6.25 MG tablet 30 tablet 5  4/27/2017     Take 1 tablet (6.25 mg total) by mouth every evening. - Oral    Pharmacy: [address] Ph #: [phone]       ferrous sulfate 325 mg (65 mg iron) Tab tablet 30 tablet 0 4/27/2017     Take 1 tablet (325 mg total) by mouth once daily. - Oral    Pharmacy: [address] Ph #: [phone]       furosemide (LASIX) 40 MG tablet 60 tablet 5 4/27/2017     Take 1 tablet (40 mg total) by mouth 2 (two) times daily. - Oral    Pharmacy: [address] Ph #: [phone]       gabapentin (NEURONTIN) 800 MG tablet 60 tablet 5 4/27/2017     Take 1 tablet (800 mg total) by mouth 2 (two) times daily. - Oral    Pharmacy: [address] Ph #: [phone]       insulin aspart (NOVOLOG) 100 unit/mL injection 10.8 mL 11 4/27/2017 4/27/2018    Inject 12 Units into the skin 3 (three) times daily before meals. - Subcutaneous    Pharmacy: [address] Ph #: [phone]       insulin regular 100 unit/mL Inj injection 10 mL 5 4/27/2017     Inject 25 Units into the skin as needed. - Subcutaneous    Pharmacy: [address] Ph #: [phone]       losartan (COZAAR) 100 MG tablet 30 tablet 5 4/27/2017     Take 1 tablet (100 mg total) by mouth once daily. - Oral    Pharmacy: [address] Ph #: [phone]       nortriptyline (PAMELOR) 25 MG capsule 30 capsule 5 4/27/2017 10/24/2017    Take 1 capsule (25 mg total) by mouth every evening. - Oral    Pharmacy: [address] Ph #: [phone]       potassium chloride SA (K-DUR,KLOR-CON) 20 MEQ tablet 60 tablet 5 4/27/2017 10/24/2017    Take 1 tablet (20 mEq total) by mouth 2 (two) times daily. - Oral    Pharmacy: [id number] [address]  [place] [address] Ph #: [phone]       tramadol (ULTRAM) 50 mg tablet 60 tablet 2 4/27/2017     Take 1 tablet (50 mg total) by mouth 2 (two) times daily as needed. - Oral    Pharmacy: [facility] [place]  [place]  [address] [place] [address] Ph #: [phone]       mupirocin (BACTROBAN) 2 % ointment 22 g 0 4/27/2017     Apply topically 3 (three) times daily. - Topical (Top)    [facility] [place] [address] [place] [address] Ph #: [phone]         [facility]     [facility] - 24/7 Assistance  Unless otherwise directed by your provider, please contact [facility], our nurse care line that is available for 24/7 assistance.     Registered nurses in the [facility] provide: appointment scheduling, clinical advisement, health education, and other advisory services.  Call: [phone] (toll free)               Medications           Message regarding Medications     Verify the changes and/or additions to your medication regime listed below are the same as discussed with your clinician today.  If any of these changes or additions are incorrect, please notify your healthcare provider.        START taking these NEW medications        Refills    mupirocin (BACTROBAN) 2 % ointment 0    Sig: Apply topically 3 (three) times daily.    Class: Normal    Route: Topical (Top)      CHANGE how you are taking these medications     Start Taking Instead of    ferrous sulfate 325 mg (65 mg iron) Tab tablet ferrous sulfate 325 mg (65 mg iron) Tab    Dosage:  Take 1 tablet (325 mg total) by mouth once daily. Dosage:  Take 1 tablet by mouth once daily.     Reason for Change:  Reorder     gabapentin (NEURONTIN) 800 MG tablet gabapentin (NEURONTIN) 800 MG tablet    Dosage:  Take 1 tablet (800 mg total) by mouth 2 (two) times daily. Dosage:  TAKE ONE TABLET BY MOUTH TWICE DAILY    Reason for Change:  Reorder            Verify that the below list of medications  "is an accurate representation of the medications you are currently taking.  If none reported, the list may be blank. If incorrect, please contact your healthcare provider. Carry this list with you in case of emergency.           Current Medications     albuterol (PROVENTIL HFA/VENTOLIN HFA) 200 puff inhaler Inhale 2 puffs into the lungs every 6 (six) hours as needed.      amlodipine (NORVASC) 10 MG tablet Take 1 tablet (10 mg total) by mouth once daily.    atorvastatin (LIPITOR) 40 MG tablet Take 1 tablet (40 mg total) by mouth once daily.    carvedilol (COREG) 6.25 MG tablet Take 1 tablet (6.25 mg total) by mouth every evening.    ferrous sulfate 325 mg (65 mg iron) Tab tablet Take 1 tablet (325 mg total) by mouth once daily.    furosemide (LASIX) 40 MG tablet Take 1 tablet (40 mg total) by mouth 2 (two) times daily.    gabapentin (NEURONTIN) 800 MG tablet Take 1 tablet (800 mg total) by mouth 2 (two) times daily.    insulin aspart (NOVOLOG) 100 unit/mL injection Inject 12 Units into the skin 3 (three) times daily before meals.    losartan (COZAAR) 100 MG tablet Take 1 tablet (100 mg total) by mouth once daily.    nortriptyline (PAMELOR) 25 MG capsule Take 1 capsule (25 mg total) by mouth every evening.    potassium chloride SA (K-DUR,KLOR-CON) 20 MEQ tablet Take 1 tablet (20 mEq total) by mouth 2 (two) times daily.    tramadol (ULTRAM) 50 mg tablet Take 1 tablet (50 mg total) by mouth 2 (two) times daily as needed.    cloNIDine (CATAPRES) 0.1 MG tablet Take 1 tablet (0.1 mg total) by mouth 2 (two) times daily.    insulin regular 100 unit/mL Inj injection Inject 25 Units into the skin as needed.    mupirocin (BACTROBAN) 2 % ointment Apply topically 3 (three) times daily.           Clinical Reference Information           Your Vitals Were     BP Pulse Temp Resp Height Weight    120/66 110 98 °F (36.7 °C) (Oral) 20 5' 3" (1.6 m) 107.7 kg (237 lb 8.7 oz)    Last Period SpO2 BMI          11/04/2013 97% 42.08 kg/m2      "   Blood Pressure          Most Recent Value    BP  120/66      Allergies as of 4/27/2017     Lisinopril      Immunizations Administered on Date of Encounter - 4/27/2017     None      Orders Placed During Today's Visit      Normal Orders This Visit    Ambulatory consult to Endocrinology     Future Labs/Procedures Expected by Expires    Hemoglobin A1c  4/27/2017 4/27/2018    X-Ray Tibia Fibula 2 View Right  4/27/2017 4/27/2018      Language Assistance Services     ATTENTION: Language assistance services are available, free of charge. Please call [phone].      ATENCIÓN: Si habla español, tiene a portillo disposición servicios gratuitos de asistencia lingüística. Llame al [phone].     CHÚ Ý: N?u b?n nói Ti?ng Vi?t, có các d?ch v? h? tr? ngôn ng? mi?n phí dành cho b?n. G?i s? [phone].         [facility] complies with applicable Federal civil rights laws and does not discriminate on the basis of race, color, national origin, age, disability, or sex.

## 2017-04-27 NOTE — PROGRESS NOTES
Chief Complaint   Patient presents with    Fall     Tue    Diabetes       HPI  Michelle Donohue is a 57 y.o. female with multiple medical diagnoses as listed in the medical history and problem list that presents for evaluation after a fall that occurred two days ago. She was reaching for her insulin pen when she fell off her bed and her right leg went through her floor and was lodged there until her children got her out. She has superficial wounds on her leg that are painful and her leg is painful with walking. Her landlord has fixed her floor but she is still scared to move around the room.     PAST MEDICAL HISTORY:  Past Medical History:   Diagnosis Date    Asthma     Diabetes mellitus, type 2     Diabetic neuropathy     Diabetic retinopathy     Epiretinal membrane, left eye     Heart failure     Hyperlipidemia     Hypertension     Retinal detachment     Sickle cell trait     Vitreous hemorrhage of both eyes        PAST SURGICAL HISTORY:  Past Surgical History:   Procedure Laterality Date    Avastin Injection Bilateral     BREAST LUMPECTOMY      right breast    BTL      CATARACT EXTRACTION W/  INTRAOCULAR LENS IMPLANT Left 3/25/2015    OS (Dr. Montes)    CATARACT EXTRACTION W/  INTRAOCULAR LENS IMPLANT Right 6/17/15    OD ()    PANRETINAL PHOTOCOAGULATION      Both eyes       SOCIAL HISTORY:  Social History     Social History    Marital status: Single     Spouse name: N/A    Number of children: 2    Years of education: N/A     Occupational History          Social History Main Topics    Smoking status: Never Smoker    Smokeless tobacco: Never Used    Alcohol use No    Drug use: No    Sexual activity: Yes     Partners: Male      Comment: . 2 children. works as a .     Other Topics Concern    Not on file     Social History Narrative       FAMILY HISTORY:  Family History   Problem Relation Age of Onset    Heart failure Father 60          Hyperlipidemia Father 60         Hypertension Father 60         Diabetes Father 60         Diabetes Mother     Hypertension Mother     Diabetes Brother     Heart failure Brother     Hyperlipidemia Brother     Hypertension Brother        ALLERGIES AND MEDICATIONS: updated and reviewed.  Review of patient's allergies indicates:   Allergen Reactions    Lisinopril Other (See Comments)     cough     Current Outpatient Prescriptions   Medication Sig Dispense Refill    albuterol (PROVENTIL HFA/VENTOLIN HFA) 200 puff inhaler Inhale 2 puffs into the lungs every 6 (six) hours as needed.        amlodipine (NORVASC) 10 MG tablet Take 1 tablet (10 mg total) by mouth once daily. 30 tablet 5    atorvastatin (LIPITOR) 40 MG tablet Take 1 tablet (40 mg total) by mouth once daily. 30 tablet 5    carvedilol (COREG) 6.25 MG tablet Take 1 tablet (6.25 mg total) by mouth every evening. 30 tablet 5    ferrous sulfate 325 mg (65 mg iron) Tab tablet Take 1 tablet (325 mg total) by mouth once daily. 30 tablet 0    furosemide (LASIX) 40 MG tablet Take 1 tablet (40 mg total) by mouth 2 (two) times daily. 60 tablet 5    gabapentin (NEURONTIN) 800 MG tablet Take 1 tablet (800 mg total) by mouth 2 (two) times daily. 60 tablet 5    insulin aspart (NOVOLOG) 100 unit/mL injection Inject 12 Units into the skin 3 (three) times daily before meals. 10.8 mL 11    losartan (COZAAR) 100 MG tablet Take 1 tablet (100 mg total) by mouth once daily. 30 tablet 5    nortriptyline (PAMELOR) 25 MG capsule Take 1 capsule (25 mg total) by mouth every evening. 30 capsule 5    potassium chloride SA (K-DUR,KLOR-CON) 20 MEQ tablet Take 1 tablet (20 mEq total) by mouth 2 (two) times daily. 60 tablet 5    tramadol (ULTRAM) 50 mg tablet Take 1 tablet (50 mg total) by mouth 2 (two) times daily as needed. 60 tablet 2    cloNIDine (CATAPRES) 0.1 MG tablet Take 1 tablet (0.1 mg total) by mouth 2 (two) times daily. 60 tablet 5  "   insulin regular 100 unit/mL Inj injection Inject 25 Units into the skin as needed. 10 mL 5    mupirocin (BACTROBAN) 2 % ointment Apply topically 3 (three) times daily. 22 g 0     Current Facility-Administered Medications   Medication Dose Route Frequency Provider Last Rate Last Dose    albuterol nebulizer solution 2.5 mg  2.5 mg Nebulization 1 time in Clinic/HOD RONY Woo MD           ROS  Review of Systems   Constitutional: Negative for chills, diaphoresis, fatigue, fever and unexpected weight change.   HENT: Negative for rhinorrhea, sinus pressure, sore throat and tinnitus.    Eyes: Negative for photophobia and visual disturbance.   Respiratory: Negative for cough, shortness of breath and wheezing.    Cardiovascular: Negative for chest pain and palpitations.   Gastrointestinal: Negative for abdominal pain, blood in stool, constipation, diarrhea, nausea and vomiting.   Genitourinary: Negative for dysuria, flank pain, frequency and vaginal discharge.   Musculoskeletal: Positive for arthralgias and gait problem. Negative for joint swelling.   Skin: Positive for wound. Negative for rash.   Neurological: Negative for speech difficulty, weakness, light-headedness and headaches.   Psychiatric/Behavioral: Negative for behavioral problems and dysphoric mood.       Physical Exam  Vitals:    04/27/17 1022   BP: 120/66   Pulse: 110   Resp: 20   Temp: 98 °F (36.7 °C)   TempSrc: Oral   SpO2: 97%   Weight: 107.7 kg (237 lb 8.7 oz)   Height: 5' 3" (1.6 m)    Body mass index is 42.08 kg/(m^2).  Weight: 107.7 kg (237 lb 8.7 oz)   Height: 5' 3" (160 cm)     Physical Exam   Constitutional: She is oriented to person, place, and time. She appears well-developed and well-nourished. No distress.   HENT:   Head: Normocephalic and atraumatic.   Eyes: EOM are normal.   Neck: Neck supple.   Cardiovascular: Normal rate and regular rhythm.  Exam reveals no gallop and no friction rub.    No murmur heard.  Pulmonary/Chest: " Effort normal and breath sounds normal. No respiratory distress. She has no wheezes. She has no rales.   Musculoskeletal:   Right lower ext-sensation intact to light touch, edema present, pain over anterior tibia and lateral tibia   Neurological: She is alert and oriented to person, place, and time.   Skin: Skin is warm and dry. No rash noted. There is erythema.   Superficial abrasions below right knee and right shin   Psychiatric: She has a normal mood and affect. Her behavior is normal.   Nursing note and vitals reviewed.      Health Maintenance       Date Due Completion Date    Fecal Occult Blood Test (FOBT) 1960 ---    Pneumococcal PCV13 (High Risk) (1 - PCV13 Required) 2/27/1961 ---    Pneumococcal PPSV23 (High Risk) (1) 2/27/1979 ---    Pap Smear 2/27/1982 ---    Mammogram 8/19/2015 8/19/2014    Eye Exam 2/10/2017 2/10/2016 (Done)    Override on 2/10/2016: Done    Hemoglobin A1c 4/24/2017 1/24/2017    Foot Exam 8/3/2017 8/3/2016    Lipid Panel 8/3/2017 8/3/2016    Urine Microalbumin 8/4/2017 8/4/2016    Colonoscopy 8/13/2024 8/13/2014 (Declined)    Override on 8/13/2014: Declined    TETANUS VACCINE 4/27/2027 4/27/2017 (Declined)    Override on 4/27/2017: Declined            ASSESSMENT     1. Fall, initial encounter    2. Right leg pain    3. Essential hypertension    4. Chronic congestive heart failure, unspecified congestive heart failure type    5. Type 2 diabetes mellitus, uncontrolled, with neuropathy    6. Uncontrolled type 2 diabetes mellitus with complication, unspecified long term insulin use status    7. Uncontrolled type 2 diabetes mellitus with stage 3 chronic kidney disease, unspecified long term insulin use status    8. Insulin long-term use    9. Hypokalemia    10. Acute pain of left shoulder    11. Skin abrasion        PLAN:     Fall, initial encounter  -     X-Ray Tibia Fibula 2 View Right; Future; Expected date: 4/27/17    Right leg pain  -     X-Ray Tibia Fibula 2 View Right; Future;  Expected date: 4/27/17    Essential hypertension  -     cloNIDine (CATAPRES) 0.1 MG tablet; Take 1 tablet (0.1 mg total) by mouth 2 (two) times daily.  Dispense: 60 tablet; Refill: 5  -     amlodipine (NORVASC) 10 MG tablet; Take 1 tablet (10 mg total) by mouth once daily.  Dispense: 30 tablet; Refill: 5  -     carvedilol (COREG) 6.25 MG tablet; Take 1 tablet (6.25 mg total) by mouth every evening.  Dispense: 30 tablet; Refill: 5  -     losartan (COZAAR) 100 MG tablet; Take 1 tablet (100 mg total) by mouth once daily.  Dispense: 30 tablet; Refill: 5    Chronic congestive heart failure, unspecified congestive heart failure type  -     carvedilol (COREG) 6.25 MG tablet; Take 1 tablet (6.25 mg total) by mouth every evening.  Dispense: 30 tablet; Refill: 5  -     furosemide (LASIX) 40 MG tablet; Take 1 tablet (40 mg total) by mouth 2 (two) times daily.  Dispense: 60 tablet; Refill: 5  -     Hemoglobin A1c; Future; Expected date: 4/27/17    Type 2 diabetes mellitus, uncontrolled, with neuropathy  -     gabapentin (NEURONTIN) 800 MG tablet; Take 1 tablet (800 mg total) by mouth 2 (two) times daily.  Dispense: 60 tablet; Refill: 5  -     insulin aspart (NOVOLOG) 100 unit/mL injection; Inject 12 Units into the skin 3 (three) times daily before meals.  Dispense: 10.8 mL; Refill: 11  -     insulin regular 100 unit/mL Inj injection; Inject 25 Units into the skin as needed.  Dispense: 10 mL; Refill: 5  -     nortriptyline (PAMELOR) 25 MG capsule; Take 1 capsule (25 mg total) by mouth every evening.  Dispense: 30 capsule; Refill: 5  -     tramadol (ULTRAM) 50 mg tablet; Take 1 tablet (50 mg total) by mouth 2 (two) times daily as needed.  Dispense: 60 tablet; Refill: 2    Uncontrolled type 2 diabetes mellitus with complication, unspecified long term insulin use status  -     insulin regular 100 unit/mL Inj injection; Inject 25 Units into the skin as needed.  Dispense: 10 mL; Refill: 5  -     Ambulatory consult to  Endocrinology    Uncontrolled type 2 diabetes mellitus with stage 3 chronic kidney disease, unspecified long term insulin use status  -     insulin regular 100 unit/mL Inj injection; Inject 25 Units into the skin as needed.  Dispense: 10 mL; Refill: 5    Insulin long-term use  -     insulin regular 100 unit/mL Inj injection; Inject 25 Units into the skin as needed.  Dispense: 10 mL; Refill: 5    Hypokalemia  -     potassium chloride SA (K-DUR,KLOR-CON) 20 MEQ tablet; Take 1 tablet (20 mEq total) by mouth 2 (two) times daily.  Dispense: 60 tablet; Refill: 5    Acute pain of left shoulder  -     tramadol (ULTRAM) 50 mg tablet; Take 1 tablet (50 mg total) by mouth 2 (two) times daily as needed.  Dispense: 60 tablet; Refill: 2    Skin abrasion  -     mupirocin (BACTROBAN) 2 % ointment; Apply topically 3 (three) times daily.  Dispense: 22 g; Refill: 0      R/o fracture, recommend she elevate her leg and apply ice for fifteen min intervals TID  Will refer to Patricia Alexandra with endocrinology for diabetes management, needs an A1C and we will order this another day        Rosalie Brady MD  04/27/2017 12:55 PM        Return for Follow up.

## 2017-04-28 ENCOUNTER — TELEPHONE (OUTPATIENT)
Dept: FAMILY MEDICINE | Facility: CLINIC | Age: 57
End: 2017-04-28

## 2017-04-28 NOTE — TELEPHONE ENCOUNTER
----- Message from Junaid Ulloa sent at 4/28/2017  3:51 PM CDT -----  Contact: Self/[phone]  Patient is requesting X Ray results. Thank you.

## 2017-08-21 DIAGNOSIS — M25.512 ACUTE PAIN OF LEFT SHOULDER: ICD-10-CM

## 2017-08-22 RX ORDER — TRAMADOL HYDROCHLORIDE 50 MG/1
TABLET ORAL
Qty: 60 TABLET | Refills: 2 | OUTPATIENT
Start: 2017-08-22

## 2017-08-30 ENCOUNTER — OFFICE VISIT (OUTPATIENT)
Dept: FAMILY MEDICINE | Facility: CLINIC | Age: 57
End: 2017-08-30
Payer: COMMERCIAL

## 2017-08-30 VITALS
HEIGHT: 63 IN | SYSTOLIC BLOOD PRESSURE: 120 MMHG | HEART RATE: 109 BPM | DIASTOLIC BLOOD PRESSURE: 62 MMHG | TEMPERATURE: 98 F | OXYGEN SATURATION: 96 % | BODY MASS INDEX: 42.4 KG/M2 | RESPIRATION RATE: 20 BRPM | WEIGHT: 239.31 LBS

## 2017-08-30 DIAGNOSIS — Z12.31 ENCOUNTER FOR SCREENING MAMMOGRAM FOR BREAST CANCER: ICD-10-CM

## 2017-08-30 DIAGNOSIS — I10 ESSENTIAL HYPERTENSION: Chronic | ICD-10-CM

## 2017-08-30 DIAGNOSIS — J44.89 CHRONIC OBSTRUCTIVE AIRWAY DISEASE WITH ASTHMA: Chronic | ICD-10-CM

## 2017-08-30 DIAGNOSIS — Z12.11 ENCOUNTER FOR FIT (FECAL IMMUNOCHEMICAL TEST) SCREENING: ICD-10-CM

## 2017-08-30 DIAGNOSIS — G62.9 NEUROPATHY: ICD-10-CM

## 2017-08-30 PROCEDURE — 99214 OFFICE O/P EST MOD 30 MIN: CPT | Mod: S$GLB,,, | Performed by: FAMILY MEDICINE

## 2017-08-30 PROCEDURE — 4010F ACE/ARB THERAPY RXD/TAKEN: CPT | Mod: S$GLB,,, | Performed by: FAMILY MEDICINE

## 2017-08-30 PROCEDURE — 3046F HEMOGLOBIN A1C LEVEL >9.0%: CPT | Mod: S$GLB,,, | Performed by: FAMILY MEDICINE

## 2017-08-30 PROCEDURE — 3074F SYST BP LT 130 MM HG: CPT | Mod: S$GLB,,, | Performed by: FAMILY MEDICINE

## 2017-08-30 PROCEDURE — 3008F BODY MASS INDEX DOCD: CPT | Mod: S$GLB,,, | Performed by: FAMILY MEDICINE

## 2017-08-30 PROCEDURE — 99999 PR PBB SHADOW E&M-EST. PATIENT-LVL V: CPT | Mod: PBBFAC,,, | Performed by: FAMILY MEDICINE

## 2017-08-30 PROCEDURE — 3078F DIAST BP <80 MM HG: CPT | Mod: S$GLB,,, | Performed by: FAMILY MEDICINE

## 2017-08-30 RX ORDER — TRAMADOL HYDROCHLORIDE 50 MG/1
50 TABLET ORAL 2 TIMES DAILY PRN
Qty: 60 TABLET | Refills: 0 | Status: SHIPPED | OUTPATIENT
Start: 2017-08-30 | End: 2017-10-25 | Stop reason: SDUPTHER

## 2017-09-01 NOTE — PROGRESS NOTES
Chief Complaint   Patient presents with    Chronic Pain    Medication Refill    Diabetes       HPI  Michelle Donohue is a 57 y.o. female with multiple medical diagnoses as listed in the medical history and problem list that presents for follow-up for her diabetes.     She takes tramadol, which was originally prescribed for her shoulder, for her neuropathy in her feet. She has not been checking her sugars. She admits to not being totally compliant with her diet. She has asthma and COPD that has been stable and she has not had any issues with wheezing.         PAST MEDICAL HISTORY:  Past Medical History:   Diagnosis Date    Asthma     Diabetes mellitus, type 2     Diabetic neuropathy     Diabetic retinopathy     Epiretinal membrane, left eye     Heart failure     Hyperlipidemia     Hypertension     Retinal detachment     Sickle cell trait     Vitreous hemorrhage of both eyes        PAST SURGICAL HISTORY:  Past Surgical History:   Procedure Laterality Date    Avastin Injection Bilateral     BREAST LUMPECTOMY      right breast    BTL      CATARACT EXTRACTION W/  INTRAOCULAR LENS IMPLANT Left 3/25/2015    OS (Dr. Montes)    CATARACT EXTRACTION W/  INTRAOCULAR LENS IMPLANT Right 6/17/15    OD ()    PANRETINAL PHOTOCOAGULATION      Both eyes       SOCIAL HISTORY:  Social History     Social History    Marital status: Single     Spouse name: N/A    Number of children: 2    Years of education: N/A     Occupational History          Social History Main Topics    Smoking status: Never Smoker    Smokeless tobacco: Never Used    Alcohol use No    Drug use: No    Sexual activity: Yes     Partners: Male      Comment: . 2 children. works as a .     Other Topics Concern    Not on file     Social History Narrative    No narrative on file       FAMILY HISTORY:  Family History   Problem Relation Age of Onset    Heart failure Father 60          Hyperlipidemia Father 60         Hypertension Father 60         Diabetes Father 60         Diabetes Mother     Hypertension Mother     Diabetes Brother     Heart failure Brother     Hyperlipidemia Brother     Hypertension Brother        ALLERGIES AND MEDICATIONS: updated and reviewed.  Review of patient's allergies indicates:   Allergen Reactions    Lisinopril Other (See Comments)     cough     Current Outpatient Prescriptions   Medication Sig Dispense Refill    albuterol (PROVENTIL HFA/VENTOLIN HFA) 200 puff inhaler Inhale 2 puffs into the lungs every 6 (six) hours as needed.        amlodipine (NORVASC) 10 MG tablet Take 1 tablet (10 mg total) by mouth once daily. 30 tablet 5    atorvastatin (LIPITOR) 40 MG tablet Take 1 tablet (40 mg total) by mouth once daily. 30 tablet 5    carvedilol (COREG) 6.25 MG tablet Take 1 tablet (6.25 mg total) by mouth every evening. 30 tablet 5    cloNIDine (CATAPRES) 0.1 MG tablet Take 1 tablet (0.1 mg total) by mouth 2 (two) times daily. 60 tablet 5    furosemide (LASIX) 40 MG tablet Take 1 tablet (40 mg total) by mouth 2 (two) times daily. 60 tablet 5    gabapentin (NEURONTIN) 800 MG tablet Take 1 tablet (800 mg total) by mouth 2 (two) times daily. 60 tablet 5    insulin aspart (NOVOLOG) 100 unit/mL injection Inject 12 Units into the skin 3 (three) times daily before meals. 10.8 mL 11    insulin regular 100 unit/mL Inj injection Inject 25 Units into the skin as needed. 10 mL 5    losartan (COZAAR) 100 MG tablet Take 1 tablet (100 mg total) by mouth once daily. 30 tablet 5    nortriptyline (PAMELOR) 25 MG capsule Take 1 capsule (25 mg total) by mouth every evening. 30 capsule 5    potassium chloride SA (K-DUR,KLOR-CON) 20 MEQ tablet Take 1 tablet (20 mEq total) by mouth 2 (two) times daily. 60 tablet 5    ferrous sulfate 325 mg (65 mg iron) Tab tablet Take 1 tablet (325 mg total) by mouth once daily. 30 tablet 0    tramadol (ULTRAM) 50  "mg tablet Take 1 tablet (50 mg total) by mouth 2 (two) times daily as needed. 60 tablet 0     Current Facility-Administered Medications   Medication Dose Route Frequency Provider Last Rate Last Dose    albuterol nebulizer solution 2.5 mg  2.5 mg Nebulization 1 time in Clinic/HOD RONY Woo MD           ROS  Review of Systems   Constitutional: Negative for chills, diaphoresis, fatigue, fever and unexpected weight change.   HENT: Negative for rhinorrhea, sinus pressure, sore throat and tinnitus.    Eyes: Negative for photophobia and visual disturbance.   Respiratory: Negative for cough, shortness of breath and wheezing.    Cardiovascular: Negative for chest pain and palpitations.   Gastrointestinal: Negative for abdominal pain, blood in stool, constipation, diarrhea, nausea and vomiting.   Genitourinary: Negative for dysuria, flank pain, frequency and vaginal discharge.   Musculoskeletal: Positive for arthralgias. Negative for joint swelling.   Skin: Negative for rash.   Neurological: Negative for speech difficulty, weakness, light-headedness and headaches.   Psychiatric/Behavioral: Negative for behavioral problems and dysphoric mood.       Physical Exam  Vitals:    08/30/17 1457   BP: 120/62   Pulse: 109   Resp: 20   Temp: 98.2 °F (36.8 °C)   TempSrc: Oral   SpO2: 96%   Weight: 108.6 kg (239 lb 5 oz)   Height: 5' 3" (1.6 m)    Body mass index is 42.39 kg/m².  Weight: 108.6 kg (239 lb 5 oz)   Height: 5' 3" (160 cm)     Physical Exam   Constitutional: She is oriented to person, place, and time. She appears well-developed and well-nourished. No distress.   Eyes: EOM are normal.   Neck: Neck supple.   Cardiovascular: Normal rate and regular rhythm.  Exam reveals no gallop and no friction rub.    No murmur heard.  Pulmonary/Chest: Effort normal and breath sounds normal. No respiratory distress. She has no wheezes. She has no rales.   Lymphadenopathy:     She has no cervical adenopathy.   Neurological: She is " alert and oriented to person, place, and time.   Skin: Skin is warm and dry. No rash noted.   Psychiatric: She has a normal mood and affect. Her behavior is normal.   Nursing note and vitals reviewed.      Health Maintenance       Date Due Completion Date    Pneumococcal PCV13 (High Risk) (1 - PCV13 Required) 02/27/1962 ---    Pneumococcal PPSV23 (High Risk) (1) 02/27/1979 ---    Pap Smear with HPV Cotest 02/27/1982 ---    Fecal Occult Blood Test (FOBT)/FitKit 02/27/2011 ---    Mammogram 08/19/2015 8/19/2014    Eye Exam 02/10/2017 2/10/2016 (Done)    Override on 2/10/2016: Done    Hemoglobin A1c 04/24/2017 1/24/2017    Influenza Vaccine 08/01/2017 10/12/2016 (Done)    Override on 10/12/2016: Done    Override on 10/29/2015: Done    Override on 10/1/2014: Done    Foot Exam 08/03/2017 8/3/2016    Lipid Panel 08/03/2017 8/3/2016    Urine Microalbumin 08/04/2017 8/4/2016    TETANUS VACCINE 04/27/2027 4/27/2017 (Declined)    Override on 4/27/2017: Declined            ASSESSMENT     1. Type 2 diabetes mellitus, uncontrolled, with neuropathy    2. Neuropathy    3. Chronic obstructive airway disease with asthma    4. Essential hypertension    5. Encounter for screening mammogram for breast cancer    6. Encounter for FIT (fecal immunochemical test) screening        PLAN:     Discussed that to treat her pain we need to control her diabetes, tramadol is not a first line medication for this  I will consult endocrine to help with blood sugar control  Will refer for foot exam and eye exam        Type 2 diabetes mellitus, uncontrolled, with neuropathy  -     tramadol (ULTRAM) 50 mg tablet; Take 1 tablet (50 mg total) by mouth 2 (two) times daily as needed.  Dispense: 60 tablet; Refill: 0  -     Ambulatory referral to Optometry  -     Ambulatory referral to Podiatry  -     Hemoglobin A1c; Future; Expected date: 08/30/2017  -     Lipid panel; Future; Expected date: 08/30/2017  -     Microalbumin/creatinine urine ratio; Future;  Expected date: 08/30/2017  -     CBC auto differential; Future; Expected date: 08/30/2017  -     Ambulatory consult to Endocrinology    Neuropathy  -     tramadol (ULTRAM) 50 mg tablet; Take 1 tablet (50 mg total) by mouth 2 (two) times daily as needed.  Dispense: 60 tablet; Refill: 0  -     Ambulatory consult to Endocrinology    Chronic obstructive airway disease with asthma    Essential hypertension  -     Comprehensive metabolic panel; Future; Expected date: 08/30/2017    Encounter for screening mammogram for breast cancer  -     Mammo Digital Screening Bilat with CAD; Future; Expected date: 08/30/2017    Encounter for FIT (fecal immunochemical test) screening  -     Fecal Immunochemical Test (iFOBT); Future; Expected date: 08/30/2017          Rosalie Brady MD  09/01/2017 12:44 PM        No Follow-up on file.

## 2017-09-05 ENCOUNTER — TELEPHONE (OUTPATIENT)
Dept: FAMILY MEDICINE | Facility: CLINIC | Age: 57
End: 2017-09-05

## 2017-09-05 NOTE — TELEPHONE ENCOUNTER
----- Message from Elvira Hollins sent at 9/5/2017  8:04 AM CDT -----  Contact: Self   Patient called to check on status of her Tramadol refill. Please call at [phone].

## 2017-09-06 ENCOUNTER — TELEPHONE (OUTPATIENT)
Dept: FAMILY MEDICINE | Facility: CLINIC | Age: 57
End: 2017-09-06

## 2017-09-06 NOTE — LETTER
September 6, 2017    Michelle JEFF [address]  [place] [id number]             [facility]  [address] [facility]  [place] [address]  Phone: [phone]  Fax: [phone] Dear Ms. Donohue:    Sorry we were unable to contact you to schedule your referral appointment. Please give the referral department a call at [phone].      If you have any questions or concerns, please don't hesitate to call.    Sincerely,        Kaity Mckeon MA

## 2017-09-22 ENCOUNTER — LAB VISIT (OUTPATIENT)
Dept: LAB | Facility: HOSPITAL | Age: 57
End: 2017-09-22
Attending: FAMILY MEDICINE
Payer: COMMERCIAL

## 2017-09-22 DIAGNOSIS — Z12.11 ENCOUNTER FOR FIT (FECAL IMMUNOCHEMICAL TEST) SCREENING: ICD-10-CM

## 2017-09-22 LAB — HEMOCCULT STL QL IA: NEGATIVE

## 2017-09-22 PROCEDURE — 82274 ASSAY TEST FOR BLOOD FECAL: CPT

## 2017-09-29 ENCOUNTER — TELEPHONE (OUTPATIENT)
Dept: FAMILY MEDICINE | Facility: CLINIC | Age: 57
End: 2017-09-29

## 2017-09-29 ENCOUNTER — LAB VISIT (OUTPATIENT)
Dept: LAB | Facility: HOSPITAL | Age: 57
End: 2017-09-29
Attending: FAMILY MEDICINE
Payer: COMMERCIAL

## 2017-09-29 ENCOUNTER — OFFICE VISIT (OUTPATIENT)
Dept: PODIATRY | Facility: CLINIC | Age: 57
End: 2017-09-29
Payer: COMMERCIAL

## 2017-09-29 VITALS
SYSTOLIC BLOOD PRESSURE: 130 MMHG | DIASTOLIC BLOOD PRESSURE: 60 MMHG | BODY MASS INDEX: 42.35 KG/M2 | WEIGHT: 239 LBS | HEIGHT: 63 IN

## 2017-09-29 DIAGNOSIS — R20.2 PARESTHESIAS: ICD-10-CM

## 2017-09-29 DIAGNOSIS — M24.573 EQUINUS CONTRACTURE OF ANKLE: ICD-10-CM

## 2017-09-29 DIAGNOSIS — M72.2 PLANTAR FASCIITIS: ICD-10-CM

## 2017-09-29 DIAGNOSIS — I10 ESSENTIAL HYPERTENSION: Chronic | ICD-10-CM

## 2017-09-29 DIAGNOSIS — R60.0 BILATERAL LOWER EXTREMITY EDEMA: ICD-10-CM

## 2017-09-29 DIAGNOSIS — E11.49 TYPE II DIABETES MELLITUS WITH NEUROLOGICAL MANIFESTATIONS: Primary | ICD-10-CM

## 2017-09-29 DIAGNOSIS — M79.671 PAIN OF RIGHT HEEL: ICD-10-CM

## 2017-09-29 DIAGNOSIS — M21.40 PES PLANUS, UNSPECIFIED LATERALITY: ICD-10-CM

## 2017-09-29 DIAGNOSIS — B35.1 ONYCHOMYCOSIS DUE TO DERMATOPHYTE: ICD-10-CM

## 2017-09-29 DIAGNOSIS — I50.9 CHRONIC CONGESTIVE HEART FAILURE, UNSPECIFIED CONGESTIVE HEART FAILURE TYPE: ICD-10-CM

## 2017-09-29 LAB
ALBUMIN SERPL BCP-MCNC: 3.5 G/DL
ALP SERPL-CCNC: 130 U/L
ALT SERPL W/O P-5'-P-CCNC: 17 U/L
ANION GAP SERPL CALC-SCNC: 10 MMOL/L
AST SERPL-CCNC: 17 U/L
BASOPHILS # BLD AUTO: 0.02 K/UL
BASOPHILS NFR BLD: 0.3 %
BILIRUB SERPL-MCNC: 0.3 MG/DL
BUN SERPL-MCNC: 16 MG/DL
CALCIUM SERPL-MCNC: 8.9 MG/DL
CHLORIDE SERPL-SCNC: 105 MMOL/L
CHOLEST SERPL-MCNC: 147 MG/DL
CHOLEST/HDLC SERPL: 3.4 {RATIO}
CO2 SERPL-SCNC: 25 MMOL/L
CREAT SERPL-MCNC: 1.5 MG/DL
DIFFERENTIAL METHOD: ABNORMAL
EOSINOPHIL # BLD AUTO: 0.3 K/UL
EOSINOPHIL NFR BLD: 4.4 %
ERYTHROCYTE [DISTWIDTH] IN BLOOD BY AUTOMATED COUNT: 14.9 %
EST. GFR  (AFRICAN AMERICAN): 44.6 ML/MIN/1.73 M^2
EST. GFR  (NON AFRICAN AMERICAN): 38.7 ML/MIN/1.73 M^2
ESTIMATED AVG GLUCOSE: 292 MG/DL
ESTIMATED AVG GLUCOSE: 292 MG/DL
GLUCOSE SERPL-MCNC: 256 MG/DL
HBA1C MFR BLD HPLC: 11.8 %
HBA1C MFR BLD HPLC: 11.8 %
HCT VFR BLD AUTO: 38.4 %
HDLC SERPL-MCNC: 43 MG/DL
HDLC SERPL: 29.3 %
HGB BLD-MCNC: 12.6 G/DL
LDLC SERPL CALC-MCNC: 77 MG/DL
LYMPHOCYTES # BLD AUTO: 1.6 K/UL
LYMPHOCYTES NFR BLD: 21.3 %
MCH RBC QN AUTO: 24.9 PG
MCHC RBC AUTO-ENTMCNC: 32.8 G/DL
MCV RBC AUTO: 76 FL
MONOCYTES # BLD AUTO: 0.6 K/UL
MONOCYTES NFR BLD: 7.5 %
NEUTROPHILS # BLD AUTO: 5 K/UL
NEUTROPHILS NFR BLD: 66.4 %
NONHDLC SERPL-MCNC: 104 MG/DL
PLATELET # BLD AUTO: 322 K/UL
PMV BLD AUTO: 11 FL
POTASSIUM SERPL-SCNC: 4.2 MMOL/L
PROT SERPL-MCNC: 8.3 G/DL
RBC # BLD AUTO: 5.07 M/UL
SODIUM SERPL-SCNC: 140 MMOL/L
TRIGL SERPL-MCNC: 135 MG/DL
WBC # BLD AUTO: 7.51 K/UL

## 2017-09-29 PROCEDURE — 80061 LIPID PANEL: CPT

## 2017-09-29 PROCEDURE — 83036 HEMOGLOBIN GLYCOSYLATED A1C: CPT

## 2017-09-29 PROCEDURE — 36415 COLL VENOUS BLD VENIPUNCTURE: CPT | Mod: PO

## 2017-09-29 PROCEDURE — 85025 COMPLETE CBC W/AUTO DIFF WBC: CPT

## 2017-09-29 PROCEDURE — 99999 PR PBB SHADOW E&M-EST. PATIENT-LVL III: CPT | Mod: PBBFAC,,, | Performed by: PODIATRIST

## 2017-09-29 PROCEDURE — 80053 COMPREHEN METABOLIC PANEL: CPT

## 2017-09-29 PROCEDURE — 3008F BODY MASS INDEX DOCD: CPT | Mod: S$GLB,,, | Performed by: PODIATRIST

## 2017-09-29 PROCEDURE — 99214 OFFICE O/P EST MOD 30 MIN: CPT | Mod: S$GLB,,, | Performed by: PODIATRIST

## 2017-09-29 PROCEDURE — 3075F SYST BP GE 130 - 139MM HG: CPT | Mod: S$GLB,,, | Performed by: PODIATRIST

## 2017-09-29 PROCEDURE — 3078F DIAST BP <80 MM HG: CPT | Mod: S$GLB,,, | Performed by: PODIATRIST

## 2017-09-29 NOTE — PROGRESS NOTES
Subjective:      Patient ID: Michelle Donohue is a 57 y.o. female.    Chief Complaint: Diabetes Mellitus (pcp dr. Brady 8-30-17); Diabetic Foot Exam; Nail Care; and Foot Pain (right foot )    Michelle is a 57 y.o. female who presents to the clinic for evaluation and treatment of high risk feet. Michelle has a past medical history of Asthma; Diabetes mellitus, type 2; Diabetic neuropathy; Diabetic retinopathy; Epiretinal membrane, left eye; Heart failure; Hyperlipidemia; Hypertension; Retinal detachment; Sickle cell trait; and Vitreous hemorrhage of both eyes. The patient's chief complaint is DM foot exam. Has not had foot exam in > 2 years. Also relates pain in her right heel present for several days. The pain has progressively improved. Few days ago she was unable to place any weight on the foot but states that she is able to walk with only a little pain now--2/10. Inquiring about treatment options. Still has sharp/shooting and burning pain to both feet especially at night. Takes gabapentin but this is not helping fully. Inquiring about any additional treatments to help her pain.  This patient has documented high risk feet requiring routine maintenance secondary to diabetes mellitis and those secondary complications of diabetes, as mentioned..    PCP: Rosalie Brady MD    Date Last Seen by PCP:   Chief Complaint   Patient presents with    Diabetes Mellitus     pcp dr. Brady 8-30-17    Diabetic Foot Exam    Nail Care    Foot Pain     right foot      Current shoe gear:  Clogs     Hemoglobin A1C   Date Value Ref Range Status   01/24/2017 11.0 (H) 4.5 - 6.2 % Final     Comment:     According to ADA guidelines, hemoglobin A1C <7.0% represents  optimal control in non-pregnant diabetic patients.  Different  metrics may apply to specific populations.   Standards of Medical Care in Diabetes - 2016.  For the purpose of screening for the presence of diabetes:  <5.7%     Consistent with the absence of diabetes  5.7-6.4%   Consistent with increasing risk for diabetes   (prediabetes)  >or=6.5%  Consistent with diabetes  Currently no consensus exists for use of hemoglobin A1C  for diagnosis of diabetes for children.     08/03/2016 10.3 (H) 4.5 - 6.2 % Final     Comment:     According to ADA guidelines, hemoglobin A1C <7.0% represents  optimal control in non-pregnant diabetic patients.  Different  metrics may apply to specific populations.   Standards of Medical Care in Diabetes - 2016.  For the purpose of screening for the presence of diabetes:  <5.7%     Consistent with the absence of diabetes  5.7-6.4%  Consistent with increasing risk for diabetes   (prediabetes)  >or=6.5%  Consistent with diabetes  Currently no consensus exists for use of hemoglobin A1C  for diagnosis of diabetes for children.     03/03/2016 9.6 (H) 4.5 - 6.2 % Final     Patient Active Problem List   Diagnosis    Type 2 diabetes mellitus, uncontrolled, with neuropathy    CHF (congestive heart failure)    Glaucoma (increased eye pressure)    Chronic obstructive airway disease with asthma    Type 2 diabetes mellitus with retinopathy without macular edema    Vitreous hemorrhage    Epiretinal membrane    Sickle cell trait    Essential hypertension    Combined hyperlipidemia associated with type 2 diabetes mellitus    Insulin long-term use    Morbid obesity with BMI of 40.0-44.9, adult    Uncontrolled type 2 diabetes mellitus with stage 3 chronic kidney disease    Traction detachment of retina    Diabetes mellitus type 2, uncontrolled    Hypokalemia     Current Outpatient Prescriptions on File Prior to Visit   Medication Sig Dispense Refill    albuterol (PROVENTIL HFA/VENTOLIN HFA) 200 puff inhaler Inhale 2 puffs into the lungs every 6 (six) hours as needed.        amlodipine (NORVASC) 10 MG tablet Take 1 tablet (10 mg total) by mouth once daily. 30 tablet 5    atorvastatin (LIPITOR) 40 MG tablet Take 1 tablet (40 mg total) by mouth once daily. 30  tablet 5    carvedilol (COREG) 6.25 MG tablet Take 1 tablet (6.25 mg total) by mouth every evening. 30 tablet 5    cloNIDine (CATAPRES) 0.1 MG tablet Take 1 tablet (0.1 mg total) by mouth 2 (two) times daily. 60 tablet 5    ferrous sulfate 325 mg (65 mg iron) Tab tablet Take 1 tablet (325 mg total) by mouth once daily. 30 tablet 0    furosemide (LASIX) 40 MG tablet Take 1 tablet (40 mg total) by mouth 2 (two) times daily. 60 tablet 5    gabapentin (NEURONTIN) 800 MG tablet Take 1 tablet (800 mg total) by mouth 2 (two) times daily. 60 tablet 5    insulin aspart (NOVOLOG) 100 unit/mL injection Inject 12 Units into the skin 3 (three) times daily before meals. 10.8 mL 11    insulin regular 100 unit/mL Inj injection Inject 25 Units into the skin as needed. 10 mL 5    losartan (COZAAR) 100 MG tablet Take 1 tablet (100 mg total) by mouth once daily. 30 tablet 5    nortriptyline (PAMELOR) 25 MG capsule Take 1 capsule (25 mg total) by mouth every evening. 30 capsule 5    potassium chloride SA (K-DUR,KLOR-CON) 20 MEQ tablet Take 1 tablet (20 mEq total) by mouth 2 (two) times daily. 60 tablet 5    tramadol (ULTRAM) 50 mg tablet Take 1 tablet (50 mg total) by mouth 2 (two) times daily as needed. 60 tablet 0     Current Facility-Administered Medications on File Prior to Visit   Medication Dose Route Frequency Provider Last Rate Last Dose    albuterol nebulizer solution 2.5 mg  2.5 mg Nebulization 1 time in Clinic/HOD RONY Woo MD         Allergies   Allergen Reactions    Lisinopril Other (See Comments)     cough     Past Surgical History:   Procedure Laterality Date    Avastin Injection Bilateral     BREAST LUMPECTOMY      right breast    BTL      CATARACT EXTRACTION W/  INTRAOCULAR LENS IMPLANT Left 3/25/2015    OS (Dr. Montes)    CATARACT EXTRACTION W/  INTRAOCULAR LENS IMPLANT Right 6/17/15    OD ()    PANRETINAL PHOTOCOAGULATION      Both eyes     Family History   Problem  "Relation Age of Onset    Heart failure Father 60         Hyperlipidemia Father 60         Hypertension Father 60         Diabetes Father 60         Diabetes Mother     Hypertension Mother     Diabetes Brother     Heart failure Brother     Hyperlipidemia Brother     Hypertension Brother      Social History     Social History    Marital status: Single     Spouse name: N/A    Number of children: 2    Years of education: N/A     Occupational History          Social History Main Topics    Smoking status: Never Smoker    Smokeless tobacco: Never Used    Alcohol use No    Drug use: No    Sexual activity: Yes     Partners: Male      Comment: . 2 children. works as a .     Other Topics Concern    Not on file     Social History Narrative    No narrative on file     Review of Systems   Constitution: Negative for chills, fever and weakness.   Cardiovascular: Positive for leg swelling. Negative for claudication.   Respiratory: Negative for cough and shortness of breath.    Skin: Positive for dry skin and nail changes. Negative for itching and rash.   Musculoskeletal: Negative for falls, joint pain, joint swelling and muscle weakness.        Heel pain R   Gastrointestinal: Negative for diarrhea, nausea and vomiting.   Neurological: Positive for numbness and paresthesias. Negative for tremors.   Psychiatric/Behavioral: Negative for altered mental status and hallucinations.           Objective:       Vitals:    17 0800   BP: 130/60   Weight: 108.4 kg (239 lb)   Height: 5' 3" (1.6 m)   PainSc:   2   PainLoc: Foot       Physical Exam   Constitutional:   General: Pt. is well-developed, well-nourished, appears stated age, in no acute distress, alert and oriented x 3. No evidence of depression, anxiety, or agitation. Calm, cooperative, and communicative. Appropriate interactions and affect.       Cardiovascular:   Pulses:       Dorsalis pedis " pulses are 1+ on the right side, and 1+ on the left side.        Posterior tibial pulses are 1+ on the right side, and 1+ on the left side.   Dorsalis pedis and posterior tibial pulses are diminished Bilaterally. Toes are cool to touch. Feet are warm proximally.There is decreased digital hair.   Musculoskeletal:        Right ankle: She exhibits normal range of motion and no swelling. No tenderness. Achilles tendon exhibits no pain and no defect.        Left ankle: She exhibits normal range of motion and no swelling. No tenderness. Achilles tendon exhibits no pain and no defect.        Right foot: There is normal range of motion and no tenderness.        Left foot: There is normal range of motion and no tenderness.   Adequate joint range of motion without pain, limitation, nor crepitation Bilateral feet and ankle joints. Muscle strength is 5/5 in all groups bilaterally.    Decreased stride, station of gait.  apropulsive toe off.  Increased angle and base of gait.    Patient has hammertoes of digits 2-5 bilateral partially reducible without symptom today.    Fat pad atrophy to heels and met heads bilateral    Mild pain with palpation of plantar medial calcaneal tuber R. No pain with lateral calc squeeze. No pain with STJ ROM or palpation of Achilles tendon/insertion, Abductor hallucis muscle belly, TA tendon/insertion R. No pain with palpation of PT tendon as it crosses the flexor retinaculum, ankle and onto its insertion on the plantar medial navicular.    Neurological: A sensory deficit is present.   [place] 5.07 monofilamant testing is diminished Homero feet. Sharp/dull sensation diminished Bilaterally. Subjective paresthesias with no clearly identifiable source or trigger.    Skin: Skin is warm, dry and intact. No abrasion, no ecchymosis, no lesion and no rash noted. She is not diaphoretic. No cyanosis or erythema. No pallor. Nails show no clubbing.   Nails neatly trimmed    Interdigital Spaces clean, dry  and without evidence of break in skin integrity   Psychiatric: She has a normal mood and affect. Her speech is normal.   Nursing note and vitals reviewed.        Assessment:       Encounter Diagnoses   Name Primary?    Type II diabetes mellitus with neurological manifestations Yes    Bilateral lower extremity edema     Onychomycosis due to dermatophyte     Paresthesias     Plantar fasciitis     Pain of right heel     Equinus contracture of ankle     Pes planus, unspecified laterality          Plan:       Michelle was seen today for diabetes mellitus, diabetic foot exam, nail care and foot pain.    Diagnoses and all orders for this visit:    Type II diabetes mellitus with neurological manifestations  -     DIABETIC SHOES FOR HOME USE    Bilateral lower extremity edema  -     DIABETIC SHOES FOR HOME USE    Onychomycosis due to dermatophyte    Paresthesias  -     DIABETIC SHOES FOR HOME USE    Plantar fasciitis  -     DIABETIC SHOES FOR HOME USE    Pain of right heel  -     DIABETIC SHOES FOR HOME USE    Equinus contracture of ankle  -     DIABETIC SHOES FOR HOME USE    Pes planus, unspecified laterality  -     DIABETIC SHOES FOR HOME USE      I counseled the patient on her conditions, their implications and medical management. Greater than 20 minutes spent on education about the diabetic foot, neuropathy, and prevention of limb loss.    - Shoe inspection. Diabetic Foot Education. Patient reminded of the importance of good nutrition and blood sugar control to help prevent podiatric complications of diabetes. Patient instructed on proper foot hygeine. We discussed wearing proper shoe gear, daily foot inspections, never walking without protective shoe gear, never putting sharp instruments to feet.   She will continue to monitor the areas daily, inspect her feet, wear protective shoe gear when ambulatory, moisturizer to maintain skin integrity and follow in this office in approximately 6 weeks for heel pain and  every 6 months thereafter for DM foot exam. advised to call sooner p.r.n.    Advised to stretch calf muscles multiple times daily for increased flexibility of ankles and to help decrease forefoot pressure over time. Illustrative stretching exercise handout provided.     Night splint dispensed and demonstrated application to right foot. Advised to use at all times while resting/sleeping to allow adequate stretching of the calf muscle. Continue daily stretching in addition to this.     Rx diabetic shoes with custom molded inserts to be worn at all times while ambulating. Prescription provided with list of local retailers.     Rx compound pain cream to be applied to areas of paresthesias up to 4-5 x daily as needed for pain. Prescription faxed to [facility].

## 2017-09-29 NOTE — TELEPHONE ENCOUNTER
Her diabetes is still not well controlled, please tell her to call referrals as they have tried to reach her to set up her endocrine appt

## 2017-09-29 NOTE — PATIENT INSTRUCTIONS
Recommend lotions: eucerin, aquaphor, A&D ointment, gold bond for diabetics, sween    Shoe recommendations: (try 6pm.com, zappos.com , nordstromrack.com, or shoes.com for discounted prices) you can visit [employer] shoes in [place] as well    Asics (GT 1000 or gel foundations), new balance, saucony (stabil c3),  Carlos (transcend), vionic, propet (tennis shoe)    soft brand, clarks, crocs, aerosoles, naturalizers, SAS, ecco, jeremiah, roma lopez, rockports (dress shoes)    Vionic, volitiles, burkenstocks, fitflops, propet (sandals)    Nike comfort thong sandals, crocs (house shoes)    Nail Home remedy:  Vicks Vapor rub OR Listerine and apple cider vinegar in a spray bottle to nails    Occasional soaks for 15-20 mins in luke warm water with 1 cup of listerine and 1 cup of apple cider vinegar are ok You may add several drops of oil of oregano or tea tree oil as well      Diabetes: Inspecting Your Feet  Diabetes increases your chances of developing foot problems. So inspect your feet every day. This helps you find small skin irritations before they become serious infections. If you have trouble seeing the bottoms of your feet, use a mirror or ask a family member or friend to help.     Pressure spots on the bottom of the foot are common areas where problems develop.   How to check your feet  Below are tips to help you look for foot problems. Try to check your feet at the same time each day, such as when you get out of bed in the morning:  · Check the top of each foot. The tops of toes, back of the heel, and outer edge of the foot can get a lot of rubbing from poor-fitting shoes.  · Check the bottom of each foot. Daily wear and tear often leads to problems at pressure spots.  · Check the toes and nails. Fungal infections often occur between toes. Toenail problems can also be a sign of fungal infections or lead to breaks in the skin.  · Check your shoes, too. Loose objects inside a shoe can injure the foot. Use your hand to feel  inside your shoes for things like eran, loose stitching, or rough areas that could irritate your skin.  Warning signs  Look for any color changes in the foot. Redness with streaks can signal a severe infection, which needs immediate medical attention. Tell your doctor right away if you have any of these problems:  · Swelling, sometimes with color changes, may be a sign of poor blood flow or infection. Symptoms include tenderness and an increase in the size of your foot.  · Warm or hot areas on your feet may be signs of infection. A foot that is cold may not be getting enough blood.  · Sensations such as burning, tingling, or pins and needles can be signs of a problem. Also check for areas that may be numb.  · Hot spots are caused by friction or pressure. Look for hot spots in areas that get a lot of rubbing. Hot spots can turn into blisters, calluses, or sores.  · Cracks and sores are caused by dry or irritated skin. They are a sign that the skin is breaking down, which can lead to infection.  · Toenail problems to watch for include nails growing into the skin (ingrown toenail) and causing redness or pain. Thick, yellow, or discolored nails can signal a fungal infection.  · Drainage and odor can develop from untreated sores and ulcers. Call your doctor right away if you notice white or yellow drainage, bleeding, or unpleasant odor.   © 9681-4726 [employer]. [address] [place] [address], [place] [address]. All rights reserved. This information is not intended as a substitute for professional medical care. Always follow your healthcare professional's instructions.

## 2017-10-02 ENCOUNTER — TELEPHONE (OUTPATIENT)
Dept: FAMILY MEDICINE | Facility: CLINIC | Age: 57
End: 2017-10-02

## 2017-10-02 NOTE — TELEPHONE ENCOUNTER
----- Message from Maribeth Hollins sent at 9/29/2017  8:46 AM CDT -----  Patient needs a new handicap form so she can renew her sticker. Please call patient at [phone]. She would also like to know if this can be faxed to her Thank you!

## 2017-10-03 ENCOUNTER — HOSPITAL ENCOUNTER (OUTPATIENT)
Dept: RADIOLOGY | Facility: HOSPITAL | Age: 57
Discharge: HOME | End: 2017-10-03
Attending: FAMILY MEDICINE
Payer: COMMERCIAL

## 2017-10-03 DIAGNOSIS — Z12.31 ENCOUNTER FOR SCREENING MAMMOGRAM FOR BREAST CANCER: ICD-10-CM

## 2017-10-03 PROCEDURE — 77067 SCR MAMMO BI INCL CAD: CPT | Mod: TC

## 2017-10-03 PROCEDURE — 77063 BREAST TOMOSYNTHESIS BI: CPT | Mod: 26,,, | Performed by: RADIOLOGY

## 2017-10-03 PROCEDURE — 77067 SCR MAMMO BI INCL CAD: CPT | Mod: 26,,, | Performed by: RADIOLOGY

## 2017-10-05 ENCOUNTER — TELEPHONE (OUTPATIENT)
Dept: FAMILY MEDICINE | Facility: CLINIC | Age: 57
End: 2017-10-05

## 2017-10-05 DIAGNOSIS — R92.8 ABNORMAL MAMMOGRAM: Primary | ICD-10-CM

## 2017-10-05 NOTE — TELEPHONE ENCOUNTER
Pt advised of physician recommendations.  Pt verbalized an understanding.  Mammogram scheduled for Tu, 10/17/17 & pt aware of possible US.

## 2017-10-05 NOTE — TELEPHONE ENCOUNTER
Please let her know her mammogram was inconclusive and a more advanced mammogram is needed. I have ordered this along with a breast ultrasound

## 2017-10-06 ENCOUNTER — TELEPHONE (OUTPATIENT)
Dept: FAMILY MEDICINE | Facility: CLINIC | Age: 57
End: 2017-10-06

## 2017-10-06 NOTE — TELEPHONE ENCOUNTER
Spoke with pt and scheduled her appt to see Patricia Alexandra on 10/25/17 at 9:30am.Mailed out appt reminder.

## 2017-10-11 ENCOUNTER — HOSPITAL ENCOUNTER (OUTPATIENT)
Dept: RADIOLOGY | Facility: HOSPITAL | Age: 57
Discharge: HOME | End: 2017-10-11
Attending: FAMILY MEDICINE
Payer: COMMERCIAL

## 2017-10-11 ENCOUNTER — OFFICE VISIT (OUTPATIENT)
Dept: FAMILY MEDICINE | Facility: CLINIC | Age: 57
End: 2017-10-11
Payer: COMMERCIAL

## 2017-10-11 VITALS
HEIGHT: 63 IN | WEIGHT: 237.88 LBS | HEART RATE: 112 BPM | DIASTOLIC BLOOD PRESSURE: 72 MMHG | OXYGEN SATURATION: 97 % | RESPIRATION RATE: 20 BRPM | TEMPERATURE: 98 F | SYSTOLIC BLOOD PRESSURE: 144 MMHG | BODY MASS INDEX: 42.15 KG/M2

## 2017-10-11 DIAGNOSIS — E11.319 TYPE 2 DIABETES MELLITUS WITH RETINOPATHY WITHOUT MACULAR EDEMA, WITH LONG-TERM CURRENT USE OF INSULIN, UNSPECIFIED LATERALITY, UNSPECIFIED RETINOPATHY SEVERITY: Primary | Chronic | ICD-10-CM

## 2017-10-11 DIAGNOSIS — L03.116 CELLULITIS OF LEFT LOWER EXTREMITY: ICD-10-CM

## 2017-10-11 DIAGNOSIS — H35.373 EPIRETINAL MEMBRANE (ERM) OF BOTH EYES: ICD-10-CM

## 2017-10-11 DIAGNOSIS — L03.115 CELLULITIS OF RIGHT LOWER EXTREMITY: ICD-10-CM

## 2017-10-11 DIAGNOSIS — Z79.4 TYPE 2 DIABETES MELLITUS WITH RETINOPATHY WITHOUT MACULAR EDEMA, WITH LONG-TERM CURRENT USE OF INSULIN, UNSPECIFIED LATERALITY, UNSPECIFIED RETINOPATHY SEVERITY: Primary | Chronic | ICD-10-CM

## 2017-10-11 DIAGNOSIS — E11.319 TYPE 2 DIABETES MELLITUS WITH RETINOPATHY WITHOUT MACULAR EDEMA, WITH LONG-TERM CURRENT USE OF INSULIN, UNSPECIFIED LATERALITY, UNSPECIFIED RETINOPATHY SEVERITY: Chronic | ICD-10-CM

## 2017-10-11 DIAGNOSIS — Z79.4 TYPE 2 DIABETES MELLITUS WITH RETINOPATHY WITHOUT MACULAR EDEMA, WITH LONG-TERM CURRENT USE OF INSULIN, UNSPECIFIED LATERALITY, UNSPECIFIED RETINOPATHY SEVERITY: Chronic | ICD-10-CM

## 2017-10-11 DIAGNOSIS — H43.13 VITREOUS HEMORRHAGE OF BOTH EYES: ICD-10-CM

## 2017-10-11 PROCEDURE — 99999 PR PBB SHADOW E&M-EST. PATIENT-LVL IV: CPT | Mod: PBBFAC,,, | Performed by: FAMILY MEDICINE

## 2017-10-11 PROCEDURE — 73630 X-RAY EXAM OF FOOT: CPT | Mod: 26,RT,, | Performed by: RADIOLOGY

## 2017-10-11 PROCEDURE — 73630 X-RAY EXAM OF FOOT: CPT | Mod: TC,PO,RT

## 2017-10-11 PROCEDURE — 99214 OFFICE O/P EST MOD 30 MIN: CPT | Mod: S$GLB,,, | Performed by: FAMILY MEDICINE

## 2017-10-11 RX ORDER — DOXYCYCLINE 100 MG/1
100 CAPSULE ORAL EVERY 12 HOURS
Qty: 20 CAPSULE | Refills: 0 | Status: SHIPPED | OUTPATIENT
Start: 2017-10-11 | End: 2017-10-21

## 2017-10-12 ENCOUNTER — TELEPHONE (OUTPATIENT)
Dept: PODIATRY | Facility: CLINIC | Age: 57
End: 2017-10-12

## 2017-10-12 ENCOUNTER — TELEPHONE (OUTPATIENT)
Dept: FAMILY MEDICINE | Facility: CLINIC | Age: 57
End: 2017-10-12

## 2017-10-12 NOTE — TELEPHONE ENCOUNTER
----- Message from Mariann Villalta sent at 10/12/2017  9:06 AM CDT -----  Contact: self  Pt states her primary care doctor advised her to see Dr. Kitchen today for a sore on her foot.  No available appointments unitl 11/13. Please contact  [phone].

## 2017-10-12 NOTE — PROGRESS NOTES
Chief Complaint   Patient presents with    Diabetes    Follow-up    Foot Ulcer     R foot       WALDO Donohue is a 57 y.o. female with multiple medical diagnoses as listed in the medical history and problem list that presents for follow-up for uncontrolled diabetes and an infection in her right foot. She noticed this several days ago and it is painful with some peeling skin. She has had fever and chills along with body aches.     PAST MEDICAL HISTORY:  Past Medical History:   Diagnosis Date    Asthma     Diabetes mellitus, type 2     Diabetic neuropathy     Diabetic retinopathy     Epiretinal membrane, left eye     Heart failure     Hyperlipidemia     Hypertension     Retinal detachment     Sickle cell trait     Vitreous hemorrhage of both eyes        PAST SURGICAL HISTORY:  Past Surgical History:   Procedure Laterality Date    Avastin Injection Bilateral     BREAST BIOPSY      age 16    BREAST LUMPECTOMY      right breast    BTL      CATARACT EXTRACTION W/  INTRAOCULAR LENS IMPLANT Left 3/25/2015    OS (Dr. Montes)    CATARACT EXTRACTION W/  INTRAOCULAR LENS IMPLANT Right 6/17/15    OD ()    PANRETINAL PHOTOCOAGULATION      Both eyes       SOCIAL HISTORY:  Social History     Social History    Marital status: Single     Spouse name: N/A    Number of children: 2    Years of education: N/A     Occupational History          Social History Main Topics    Smoking status: Never Smoker    Smokeless tobacco: Never Used    Alcohol use No    Drug use: No    Sexual activity: Yes     Partners: Male      Comment: . 2 children. works as a .     Other Topics Concern    Not on file     Social History Narrative    No narrative on file       FAMILY HISTORY:  Family History   Problem Relation Age of Onset    Heart failure Father 60         Hyperlipidemia Father 60         Hypertension Father 60         Diabetes Father 60          Diabetes Mother     Hypertension Mother     Diabetes Brother     Heart failure Brother     Hyperlipidemia Brother     Hypertension Brother        ALLERGIES AND MEDICATIONS: updated and reviewed.  Review of patient's allergies indicates:   Allergen Reactions    Lisinopril Other (See Comments)     cough     Current Outpatient Prescriptions   Medication Sig Dispense Refill    albuterol (PROVENTIL HFA/VENTOLIN HFA) 200 puff inhaler Inhale 2 puffs into the lungs every 6 (six) hours as needed.        amlodipine (NORVASC) 10 MG tablet Take 1 tablet (10 mg total) by mouth once daily. 30 tablet 5    atorvastatin (LIPITOR) 40 MG tablet Take 1 tablet (40 mg total) by mouth once daily. 30 tablet 5    carvedilol (COREG) 6.25 MG tablet Take 1 tablet (6.25 mg total) by mouth every evening. 30 tablet 5    cloNIDine (CATAPRES) 0.1 MG tablet Take 1 tablet (0.1 mg total) by mouth 2 (two) times daily. 60 tablet 5    ferrous sulfate 325 mg (65 mg iron) Tab tablet Take 1 tablet (325 mg total) by mouth once daily. 30 tablet 0    furosemide (LASIX) 40 MG tablet Take 1 tablet (40 mg total) by mouth 2 (two) times daily. 60 tablet 5    gabapentin (NEURONTIN) 800 MG tablet Take 1 tablet (800 mg total) by mouth 2 (two) times daily. 60 tablet 5    insulin aspart (NOVOLOG) 100 unit/mL injection Inject 12 Units into the skin 3 (three) times daily before meals. 10.8 mL 11    insulin regular 100 unit/mL Inj injection Inject 25 Units into the skin as needed. 10 mL 5    losartan (COZAAR) 100 MG tablet Take 1 tablet (100 mg total) by mouth once daily. 30 tablet 5    nortriptyline (PAMELOR) 25 MG capsule Take 1 capsule (25 mg total) by mouth every evening. 30 capsule 5    potassium chloride SA (K-DUR,KLOR-CON) 20 MEQ tablet Take 1 tablet (20 mEq total) by mouth 2 (two) times daily. 60 tablet 5    tramadol (ULTRAM) 50 mg tablet Take 1 tablet (50 mg total) by mouth 2 (two) times daily as needed. 60 tablet 0     "doxycycline (MONODOX) 100 MG capsule Take 1 capsule (100 mg total) by mouth every 12 (twelve) hours. 20 capsule 0     Current Facility-Administered Medications   Medication Dose Route Frequency Provider Last Rate Last Dose    albuterol nebulizer solution 2.5 mg  2.5 mg Nebulization 1 time in Clinic/HOD MD TYLER Huddleston  Review of Systems   Constitutional: Positive for fatigue. Negative for chills, diaphoresis, fever and unexpected weight change.   HENT: Negative for rhinorrhea, sinus pressure, sore throat and tinnitus.    Eyes: Negative for photophobia and visual disturbance.   Respiratory: Negative for cough, shortness of breath and wheezing.    Cardiovascular: Negative for chest pain and palpitations.   Gastrointestinal: Negative for abdominal pain, blood in stool, constipation, diarrhea, nausea and vomiting.   Endocrine: Positive for polyuria. Negative for polydipsia and polyphagia.   Genitourinary: Negative for dysuria, flank pain, frequency and vaginal discharge.   Musculoskeletal: Negative for arthralgias and joint swelling.   Skin: Positive for wound. Negative for pallor and rash.   Neurological: Positive for weakness. Negative for dizziness, tremors, seizures, speech difficulty, light-headedness and headaches.   Psychiatric/Behavioral: Negative for behavioral problems, confusion and dysphoric mood. The patient is not nervous/anxious.        Physical Exam  Vitals:    10/11/17 1516   BP: (!) 144/72   Pulse: (!) 112   Resp: 20   Temp: 98.4 °F (36.9 °C)   TempSrc: Oral   SpO2: 97%   Weight: 107.9 kg (237 lb 14 oz)   Height: 5' 3" (1.6 m)    Body mass index is 42.14 kg/m².  Weight: 107.9 kg (237 lb 14 oz)   Height: 5' 3" (160 cm)     Physical Exam   Constitutional: She is oriented to person, place, and time. She appears well-developed and well-nourished. No distress.   Eyes: EOM are normal.   Neck: Neck supple.   Cardiovascular: Normal rate and regular rhythm.  Exam reveals no gallop and no " friction rub.    No murmur heard.  Pulmonary/Chest: Effort normal and breath sounds normal. No respiratory distress. She has no wheezes. She has no rales.   Lymphadenopathy:     She has no cervical adenopathy.   Neurological: She is alert and oriented to person, place, and time.   Skin: Skin is warm and dry. No rash noted.   Darkening of the skin of her left great toe with warmth and drainage present   Psychiatric: She has a normal mood and affect. Her behavior is normal.   Nursing note and vitals reviewed.      Health Maintenance       Date Due Completion Date    Pneumococcal PCV13 (High Risk) (1 - PCV13 Required) 02/27/1962 ---    Pneumococcal PPSV23 (High Risk) (1) 02/27/1979 ---    Pap Smear with HPV Cotest 02/27/1982 ---    Eye Exam 02/10/2017 2/10/2016 (Done)    Override on 2/10/2016: Done    Influenza Vaccine 08/01/2017 10/12/2016 (Done)    Override on 10/12/2016: Done    Override on 10/29/2015: Done    Override on 10/1/2014: Done    Hemoglobin A1c 12/29/2017 9/29/2017    Fecal Occult Blood Test (FOBT)/[facility] 09/22/2018 9/22/2017    Override on 9/22/2017: Done    Foot Exam 09/29/2018 9/29/2017 (Done)    Override on 9/29/2017: Done    Lipid Panel 09/29/2018 9/29/2017    Urine Microalbumin 09/29/2018 9/29/2017    Mammogram 10/03/2018 10/3/2017    TETANUS VACCINE 04/27/2027 4/27/2017 (Declined)    Override on 4/27/2017: Declined            ASSESSMENT     1. 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 Cellulitis of left lower extremity    3. Vitreous hemorrhage of both eyes    4. Epiretinal membrane (ERM) of both eyes        PLAN:     Problem List Items Addressed This Visit        Ophtho    Type 2 diabetes mellitus with retinopathy without macular edema - Primary (Chronic)    Vitreous hemorrhage    Relevant Orders    Ambulatory referral to Ophthalmology    Epiretinal membrane    Relevant  Orders    Ambulatory referral to Ophthalmology      Other Visit Diagnoses     Cellulitis of left lower extremity        Relevant Orders    C-reactive protein (Completed)    CBC auto differential (Completed)        Rule out osteomyelitis, begin antibiotics, close follow up      Rosalie Brady MD  10/12/2017 7:11 AM        Return in about 1 week (around 10/18/2017) for Follow up.              Answers for HPI/ROS submitted by the patient on 10/11/2017   Diabetes problem  Diabetes type: type 2  MedicAlert ID: No  Disease duration: 30 years  blurred vision: No  foot paresthesias: Yes  foot ulcerations: Yes  visual change: No  weight loss: No  Symptom course: stable  hunger: No  mood changes: No  sleepiness: No  sweats: No  blackouts: Yes  hospitalization: No  nocturnal hypoglycemia: Yes  required assistance: Yes  required glucagon: Yes  CVA: No  heart disease: No  impotence: No  nephropathy: Yes  peripheral neuropathy: Yes  PVD: No  retinopathy: Yes  autonomic neuropathy: Yes  CAD risks: dyslipidemia, family history, hypertension, obesity, sedentary lifestyle, stress, diabetes mellitus  Current treatments: none, oral agent (monotherapy)  Treatment compliance: all of the time  Dose schedule: pre-breakfast, pre-dinner  Given by: patient  Injection sites: arms  Home blood tests: 1-2 x per week  Monitoring compliance: inadequate  Weight trend: stable  Current diet: diabetic  Meal planning: none  Exercise: intermittently  Dietitian visit: No  Eye exam current: Yes  Sees podiatrist: Yes

## 2017-10-12 NOTE — TELEPHONE ENCOUNTER
Spoke with pt regarding lab results, CRP elevated but not severely enough to say osteo, x ray is normal, WBC count, continue to monitor, podiatry appt tomorrow, close follow up, MRI if symptoms worsen

## 2017-10-13 ENCOUNTER — OFFICE VISIT (OUTPATIENT)
Dept: PODIATRY | Facility: CLINIC | Age: 57
End: 2017-10-13
Payer: COMMERCIAL

## 2017-10-13 VITALS — WEIGHT: 237 LBS | BODY MASS INDEX: 41.99 KG/M2 | HEIGHT: 63 IN

## 2017-10-13 DIAGNOSIS — L03.031 PARONYCHIA OF GREAT TOE, RIGHT: ICD-10-CM

## 2017-10-13 DIAGNOSIS — L60.0 INGROWN RIGHT BIG TOENAIL: Primary | ICD-10-CM

## 2017-10-13 PROCEDURE — 99213 OFFICE O/P EST LOW 20 MIN: CPT | Mod: S$GLB,,, | Performed by: PODIATRIST

## 2017-10-13 PROCEDURE — 99999 PR PBB SHADOW E&M-EST. PATIENT-LVL II: CPT | Mod: PBBFAC,,, | Performed by: PODIATRIST

## 2017-10-13 RX ORDER — TOBRAMYCIN 3 MG/ML
2 SOLUTION/ DROPS OPHTHALMIC DAILY
Qty: 28 DROP | Refills: 0 | Status: SHIPPED | OUTPATIENT
Start: 2017-10-13 | End: 2017-10-27

## 2017-10-13 NOTE — PROGRESS NOTES
Subjective:      Patient ID: Michelle Donohue is a 57 y.o. female.    Chief Complaint: Foot Problem (right foot great toe PCp Dr. Brady  10/11/2017 ); Diabetes Mellitus; and Ingrown Toenail    Michelle is a 57 y.o. female who presents to the clinic for evaluation and treatment of high risk feet. Michelle has a past medical history of Asthma; Diabetes mellitus, type 2; Diabetic neuropathy; Diabetic retinopathy; Epiretinal membrane, left eye; Heart failure; Hyperlipidemia; Hypertension; Retinal detachment; Sickle cell trait; and Vitreous hemorrhage of both eyes. The patient's chief complaint is infected R great toenail. Does not know when it started. This was not there 2 weeks ago when she came in for her routine DM foot appointment. Went to get fitted for her DM shoes several days ago and they noticed it. She went to her PCP on 10/11 and she was prescribed Doxycycline. She is taking this as prescribed. Here for follow up.  This patient has documented high risk feet requiring routine maintenance secondary to diabetes mellitis and those secondary complications of diabetes, as mentioned..    PCP: Rosalie Brady MD    Date Last Seen by PCP:   Chief Complaint   Patient presents with    Foot Problem     right foot great toe PCp Dr. Brady  10/11/2017     Diabetes Mellitus    Ingrown Toenail     Current shoe gear:  Clogs     Hemoglobin A1C   Date Value Ref Range Status   09/29/2017 11.8 (H) 4.0 - 5.6 % Final     Comment:     According to ADA guidelines, hemoglobin A1c <7.0% represents  optimal control in non-pregnant diabetic patients. Different  metrics may apply to specific patient populations.   Standards of Medical Care in Diabetes-2016.  For the purpose of screening for the presence of diabetes:  <5.7%     Consistent with the absence of diabetes  5.7-6.4%  Consistent with increasing risk for diabetes   (prediabetes)  >or=6.5%  Consistent with diabetes  Currently, no consensus exists for use of hemoglobin A1c  for diagnosis  of diabetes for children.  This Hemoglobin A1c assay has significant interference with fetal   hemoglobin   (HbF). The results are invalid for patients with abnormal amounts of   HbF,   including those with known Hereditary Persistence   of Fetal Hemoglobin. Heterozygous hemoglobin variants (HbAS, HbAC,   HbAD, HbAE, HbA2) do not significantly interfere with this assay;   however, presence of multiple variants in a sample may impact the %   interference.     09/29/2017 11.8 (H) 4.0 - 5.6 % Final     Comment:     According to ADA guidelines, hemoglobin A1c <7.0% represents  optimal control in non-pregnant diabetic patients. Different  metrics may apply to specific patient populations.   Standards of Medical Care in Diabetes-2016.  For the purpose of screening for the presence of diabetes:  <5.7%     Consistent with the absence of diabetes  5.7-6.4%  Consistent with increasing risk for diabetes   (prediabetes)  >or=6.5%  Consistent with diabetes  Currently, no consensus exists for use of hemoglobin A1c  for diagnosis of diabetes for children.  This Hemoglobin A1c assay has significant interference with fetal   hemoglobin   (HbF). The results are invalid for patients with abnormal amounts of   HbF,   including those with known Hereditary Persistence   of Fetal Hemoglobin. Heterozygous hemoglobin variants (HbAS, HbAC,   HbAD, HbAE, HbA2) do not significantly interfere with this assay;   however, presence of multiple variants in a sample may impact the %   interference.     01/24/2017 11.0 (H) 4.5 - 6.2 % Final     Comment:     According to ADA guidelines, hemoglobin A1C <7.0% represents  optimal control in non-pregnant diabetic patients.  Different  metrics may apply to specific populations.   Standards of Medical Care in Diabetes - 2016.  For the purpose of screening for the presence of diabetes:  <5.7%     Consistent with the absence of diabetes  5.7-6.4%  Consistent with increasing risk for diabetes    (prediabetes)  >or=6.5%  Consistent with diabetes  Currently no consensus exists for use of hemoglobin A1C  for diagnosis of diabetes for children.       Patient Active Problem List   Diagnosis    Type 2 diabetes mellitus, uncontrolled, with neuropathy    CHF (congestive heart failure)    Glaucoma (increased eye pressure)    Chronic obstructive airway disease with asthma    Type 2 diabetes mellitus with retinopathy without macular edema    Vitreous hemorrhage    Epiretinal membrane    Sickle cell trait    Essential hypertension    Combined hyperlipidemia associated with type 2 diabetes mellitus    Insulin long-term use    Morbid obesity with BMI of 40.0-44.9, adult    Uncontrolled type 2 diabetes mellitus with stage 3 chronic kidney disease    Traction detachment of retina    Diabetes mellitus type 2, uncontrolled    Hypokalemia     Current Outpatient Prescriptions on File Prior to Visit   Medication Sig Dispense Refill    albuterol (PROVENTIL HFA/VENTOLIN HFA) 200 puff inhaler Inhale 2 puffs into the lungs every 6 (six) hours as needed.        amlodipine (NORVASC) 10 MG tablet Take 1 tablet (10 mg total) by mouth once daily. 30 tablet 5    atorvastatin (LIPITOR) 40 MG tablet Take 1 tablet (40 mg total) by mouth once daily. 30 tablet 5    carvedilol (COREG) 6.25 MG tablet Take 1 tablet (6.25 mg total) by mouth every evening. 30 tablet 5    cloNIDine (CATAPRES) 0.1 MG tablet Take 1 tablet (0.1 mg total) by mouth 2 (two) times daily. 60 tablet 5    doxycycline (MONODOX) 100 MG capsule Take 1 capsule (100 mg total) by mouth every 12 (twelve) hours. 20 capsule 0    ferrous sulfate 325 mg (65 mg iron) Tab tablet Take 1 tablet (325 mg total) by mouth once daily. 30 tablet 0    furosemide (LASIX) 40 MG tablet Take 1 tablet (40 mg total) by mouth 2 (two) times daily. 60 tablet 5    gabapentin (NEURONTIN) 800 MG tablet Take 1 tablet (800 mg total) by mouth 2 (two) times daily. 60 tablet 5     insulin aspart (NOVOLOG) 100 unit/mL injection Inject 12 Units into the skin 3 (three) times daily before meals. 10.8 mL 11    insulin regular 100 unit/mL Inj injection Inject 25 Units into the skin as needed. 10 mL 5    losartan (COZAAR) 100 MG tablet Take 1 tablet (100 mg total) by mouth once daily. 30 tablet 5    nortriptyline (PAMELOR) 25 MG capsule Take 1 capsule (25 mg total) by mouth every evening. 30 capsule 5    potassium chloride SA (K-DUR,KLOR-CON) 20 MEQ tablet Take 1 tablet (20 mEq total) by mouth 2 (two) times daily. 60 tablet 5    tramadol (ULTRAM) 50 mg tablet Take 1 tablet (50 mg total) by mouth 2 (two) times daily as needed. 60 tablet 0     Current Facility-Administered Medications on File Prior to Visit   Medication Dose Route Frequency Provider Last Rate Last Dose    albuterol nebulizer solution 2.5 mg  2.5 mg Nebulization 1 time in Clinic/HOD RONY Woo MD         Allergies   Allergen Reactions    Lisinopril Other (See Comments)     cough     Past Surgical History:   Procedure Laterality Date    Avastin Injection Bilateral     BREAST BIOPSY      age 16    BREAST LUMPECTOMY      right breast    BTL      CATARACT EXTRACTION W/  INTRAOCULAR LENS IMPLANT Left 3/25/2015    OS (Dr. Montes)    CATARACT EXTRACTION W/  INTRAOCULAR LENS IMPLANT Right 6/17/15    OD ()    PANRETINAL PHOTOCOAGULATION      Both eyes     Family History   Problem Relation Age of Onset    Heart failure Father 60         Hyperlipidemia Father 60         Hypertension Father 60         Diabetes Father 60         Diabetes Mother     Hypertension Mother     Diabetes Brother     Heart failure Brother     Hyperlipidemia Brother     Hypertension Brother      Social History     Social History    Marital status: Single     Spouse name: N/A    Number of children: 2    Years of education: N/A     Occupational History          Social History  "Main Topics    Smoking status: Never Smoker    Smokeless tobacco: Never Used    Alcohol use No    Drug use: No    Sexual activity: Yes     Partners: Male      Comment: . 2 children. works as a .     Other Topics Concern    Not on file     Social History Narrative    No narrative on file     Review of Systems   Constitution: Negative for chills, fever and weakness.   Cardiovascular: Positive for leg swelling. Negative for claudication.   Respiratory: Negative for cough and shortness of breath.    Skin: Positive for dry skin and nail changes. Negative for itching and rash.   Musculoskeletal: Negative for falls, joint pain, joint swelling and muscle weakness.        Heel pain R   Gastrointestinal: Negative for diarrhea, nausea and vomiting.   Neurological: Positive for numbness and paresthesias. Negative for tremors.   Psychiatric/Behavioral: Negative for altered mental status and hallucinations.           Objective:       Vitals:    10/13/17 1523   Weight: 107.5 kg (237 lb)   Height: 5' 3" (1.6 m)   PainSc: 0-No pain       Physical Exam   Constitutional:   General: Pt. is well-developed, well-nourished, appears stated age, in no acute distress, alert and oriented x 3. No evidence of depression, anxiety, or agitation. Calm, cooperative, and communicative. Appropriate interactions and affect.       Cardiovascular:   Pulses:       Dorsalis pedis pulses are 1+ on the right side, and 1+ on the left side.        Posterior tibial pulses are 1+ on the right side, and 1+ on the left side.   Dorsalis pedis and posterior tibial pulses are diminished Bilaterally. Toes are cool to touch. Feet are warm proximally.There is decreased digital hair.   Musculoskeletal:        Right ankle: She exhibits normal range of motion and no swelling. No tenderness. Achilles tendon exhibits no pain and no defect.        Left ankle: She exhibits normal range of motion and no swelling. No tenderness. Achilles tendon " exhibits no pain and no defect.        Right foot: There is normal range of motion and no tenderness.        Left foot: There is normal range of motion and no tenderness.   Adequate joint range of motion without pain, limitation, nor crepitation Bilateral feet and ankle joints. Muscle strength is 5/5 in all groups bilaterally.    Decreased stride, station of gait.  apropulsive toe off.  Increased angle and base of gait.    Patient has hammertoes of digits 2-5 bilateral partially reducible without symptom today.    Fat pad atrophy to heels and met heads bilateral    MNo ild pain with palpation R hallux lateral nail border.    Neurological: A sensory deficit is present.   Ripley-Carrington 5.07 monofilamant testing is diminished Homero feet. Sharp/dull sensation diminished Bilaterally. Subjective paresthesias with no clearly identifiable source or trigger.    Skin: Skin is warm, dry and intact. No abrasion, no ecchymosis, no lesion and no rash noted. She is not diaphoretic. No cyanosis or erythema. No pallor. Nails show no clubbing.   R hallux lateral nail border moderately incurvated with yellow serosanguinous crusting along border. No purulence. No odor. No erythema. No streaking. Mild dry skin along dorsal lateral hallux toe adjacent to nail plate.     Interdigital Spaces clean, dry and without evidence of break in skin integrity   Psychiatric: She has a normal mood and affect. Her speech is normal.   Nursing note and vitals reviewed.        Assessment:       Encounter Diagnoses   Name Primary?    Ingrown right big toenail Yes    Paronychia of great toe, right     Type 2 diabetes mellitus, uncontrolled, with neuropathy          Plan:       Michelle was seen today for foot problem, diabetes mellitus and ingrown toenail.    Diagnoses and all orders for this visit:    Ingrown right big toenail    Paronychia of great toe, right    Type 2 diabetes mellitus, uncontrolled, with neuropathy    Other orders  -     tobramycin  sulfate 0.3% (TOBREX) 0.3 % ophthalmic solution; Place 2 drops into the right eye once daily. DO NOT APPLY IN EYES. For Right great toenail infection only.        - Shoe inspection. Diabetic Foot Education. Patient reminded of the importance of good nutrition and blood sugar control to help prevent podiatric complications of diabetes. Patient instructed on proper foot hygeine. We discussed wearing proper shoe gear, daily foot inspections, never walking without protective shoe gear, caution putting sharp instruments to feet     - Discussed DM foot care:  Wear comfortable, proper fitting shoes. Wash feet daily. Dry well. After drying, apply moisturizer to feet (no lotion to webspaces). Inspect feet daily for skin breaks, blisters, swelling, or redness. Wear cotton socks (preferably white)  Change socks every day. Do NOT walk barefoot. Do NOT use heating pads or warm/hot water soaks     Utilizing sterile toenail clippers I aggressively trimmed the offending nail border/s approximately 3 mm from its/their edge and carried the nail plate down at an angle in order to wedge out the offending cryptotic portion of the nail plate. The offending border was then removed in toto. The area was cleansed with alcohol. Patient tolerated the procedure well and related significant relief. No wound or signs of noted to the area at this time.     Rx Tobramcyin 0.3% drops.     Home care instructions. Cleanse affected toe well with soap and water. Dry well. Rx Tobramycin drops to be applied to affected nail border once daily for 10-14 days. Cover with bandaid. Advised not to apply to eyes, for affected toe only. Patient verbalized understanding.     RTC if no resolution of symptoms in 2 weeks.

## 2017-10-17 ENCOUNTER — HOSPITAL ENCOUNTER (OUTPATIENT)
Dept: RADIOLOGY | Facility: HOSPITAL | Age: 57
Discharge: HOME | End: 2017-10-17
Attending: FAMILY MEDICINE
Payer: COMMERCIAL

## 2017-10-17 VITALS — WEIGHT: 237 LBS | BODY MASS INDEX: 41.99 KG/M2 | HEIGHT: 63 IN

## 2017-10-17 DIAGNOSIS — R92.8 ABNORMAL MAMMOGRAM: ICD-10-CM

## 2017-10-17 PROCEDURE — 76642 ULTRASOUND BREAST LIMITED: CPT | Mod: TC,RT

## 2017-10-17 PROCEDURE — 76642 ULTRASOUND BREAST LIMITED: CPT | Mod: 26,RT,, | Performed by: RADIOLOGY

## 2017-10-17 PROCEDURE — 77061 BREAST TOMOSYNTHESIS UNI: CPT | Mod: TC

## 2017-10-17 PROCEDURE — 77065 DX MAMMO INCL CAD UNI: CPT | Mod: 26,,, | Performed by: RADIOLOGY

## 2017-10-17 PROCEDURE — 77061 BREAST TOMOSYNTHESIS UNI: CPT | Mod: 26,,, | Performed by: RADIOLOGY

## 2017-10-24 ENCOUNTER — TELEPHONE (OUTPATIENT)
Dept: ENDOCRINOLOGY | Facility: CLINIC | Age: 57
End: 2017-10-24

## 2017-10-25 ENCOUNTER — PATIENT MESSAGE (OUTPATIENT)
Dept: FAMILY MEDICINE | Facility: CLINIC | Age: 57
End: 2017-10-25

## 2017-10-25 ENCOUNTER — TELEPHONE (OUTPATIENT)
Dept: FAMILY MEDICINE | Facility: CLINIC | Age: 57
End: 2017-10-25

## 2017-10-25 DIAGNOSIS — I50.9 CHRONIC CONGESTIVE HEART FAILURE, UNSPECIFIED CONGESTIVE HEART FAILURE TYPE: ICD-10-CM

## 2017-10-25 DIAGNOSIS — I10 ESSENTIAL HYPERTENSION: Chronic | ICD-10-CM

## 2017-10-25 DIAGNOSIS — G62.9 NEUROPATHY: ICD-10-CM

## 2017-10-25 DIAGNOSIS — E87.6 HYPOKALEMIA: ICD-10-CM

## 2017-10-25 RX ORDER — POTASSIUM CHLORIDE 20 MEQ/1
20 TABLET, EXTENDED RELEASE ORAL 2 TIMES DAILY
Qty: 60 TABLET | Refills: 5 | Status: SHIPPED | OUTPATIENT
Start: 2017-10-25 | End: 2018-05-09 | Stop reason: SDUPTHER

## 2017-10-25 RX ORDER — NORTRIPTYLINE HYDROCHLORIDE 25 MG/1
25 CAPSULE ORAL NIGHTLY
Qty: 30 CAPSULE | Refills: 5 | Status: SHIPPED | OUTPATIENT
Start: 2017-10-25 | End: 2018-05-09 | Stop reason: SDUPTHER

## 2017-10-25 RX ORDER — TRAMADOL HYDROCHLORIDE 50 MG/1
50 TABLET ORAL 2 TIMES DAILY PRN
Qty: 60 TABLET | Refills: 0 | Status: SHIPPED | OUTPATIENT
Start: 2017-10-25 | End: 2017-12-22 | Stop reason: SDUPTHER

## 2017-10-25 RX ORDER — AMLODIPINE BESYLATE 10 MG/1
10 TABLET ORAL DAILY
Qty: 30 TABLET | Refills: 5 | Status: SHIPPED | OUTPATIENT
Start: 2017-10-25 | End: 2018-05-09 | Stop reason: SDUPTHER

## 2017-10-25 RX ORDER — CARVEDILOL 6.25 MG/1
6.25 TABLET ORAL NIGHTLY
Qty: 30 TABLET | Refills: 5 | Status: SHIPPED | OUTPATIENT
Start: 2017-10-25 | End: 2018-05-09 | Stop reason: SDUPTHER

## 2017-10-25 RX ORDER — ATORVASTATIN CALCIUM 40 MG/1
40 TABLET, FILM COATED ORAL DAILY
Qty: 30 TABLET | Refills: 5 | Status: SHIPPED | OUTPATIENT
Start: 2017-10-25 | End: 2018-05-09 | Stop reason: SDUPTHER

## 2017-10-25 RX ORDER — FUROSEMIDE 40 MG/1
40 TABLET ORAL 2 TIMES DAILY
Qty: 60 TABLET | Refills: 5 | Status: SHIPPED | OUTPATIENT
Start: 2017-10-25 | End: 2018-05-09 | Stop reason: SDUPTHER

## 2017-10-25 RX ORDER — LOSARTAN POTASSIUM 100 MG/1
100 TABLET ORAL DAILY
Qty: 30 TABLET | Refills: 5 | Status: SHIPPED | OUTPATIENT
Start: 2017-10-25 | End: 2018-05-09 | Stop reason: SDUPTHER

## 2017-10-25 RX ORDER — GABAPENTIN 800 MG/1
800 TABLET ORAL 2 TIMES DAILY
Qty: 60 TABLET | Refills: 5 | Status: SHIPPED | OUTPATIENT
Start: 2017-10-25 | End: 2018-05-09 | Stop reason: SDUPTHER

## 2017-10-25 NOTE — TELEPHONE ENCOUNTER
----- Message from Ashley Mcclelland sent at 10/25/2017  2:01 PM CDT -----  Contact: [phone]  Pt wants to know if her handicap sticker is ready pt needed it to be renewed pt requested it the last time she seen her in the office Please call pt at your earliest convenience.  Thanks !

## 2017-11-29 DIAGNOSIS — I10 ESSENTIAL HYPERTENSION: Chronic | ICD-10-CM

## 2017-11-29 DIAGNOSIS — G62.9 NEUROPATHY: ICD-10-CM

## 2017-11-29 DIAGNOSIS — I50.9 CHRONIC CONGESTIVE HEART FAILURE, UNSPECIFIED CONGESTIVE HEART FAILURE TYPE: ICD-10-CM

## 2017-11-29 RX ORDER — ATORVASTATIN CALCIUM 40 MG/1
40 TABLET, FILM COATED ORAL DAILY
Qty: 30 TABLET | Refills: 5 | Status: CANCELLED | OUTPATIENT
Start: 2017-11-29

## 2017-11-29 RX ORDER — AMLODIPINE BESYLATE 10 MG/1
10 TABLET ORAL DAILY
Qty: 30 TABLET | Refills: 5 | Status: CANCELLED | OUTPATIENT
Start: 2017-11-29 | End: 2018-05-28

## 2017-11-29 RX ORDER — TRAMADOL HYDROCHLORIDE 50 MG/1
50 TABLET ORAL 2 TIMES DAILY PRN
Qty: 60 TABLET | Refills: 0 | OUTPATIENT
Start: 2017-11-29

## 2017-11-29 RX ORDER — CARVEDILOL 6.25 MG/1
6.25 TABLET ORAL NIGHTLY
Qty: 30 TABLET | Refills: 5 | Status: CANCELLED | OUTPATIENT
Start: 2017-11-29

## 2017-11-29 RX ORDER — TRAMADOL HYDROCHLORIDE 50 MG/1
TABLET ORAL
Qty: 60 TABLET | Refills: 0 | OUTPATIENT
Start: 2017-11-29

## 2017-11-29 RX ORDER — FUROSEMIDE 40 MG/1
40 TABLET ORAL 2 TIMES DAILY
Qty: 60 TABLET | Refills: 5 | Status: CANCELLED | OUTPATIENT
Start: 2017-11-29

## 2017-11-29 RX ORDER — LOSARTAN POTASSIUM 100 MG/1
100 TABLET ORAL DAILY
Qty: 30 TABLET | Refills: 5 | Status: CANCELLED | OUTPATIENT
Start: 2017-11-29

## 2017-11-29 RX ORDER — CLONIDINE HYDROCHLORIDE 0.1 MG/1
0.1 TABLET ORAL 2 TIMES DAILY
Qty: 60 TABLET | Refills: 5 | Status: CANCELLED | OUTPATIENT
Start: 2017-11-29 | End: 2018-05-28

## 2017-11-29 RX ORDER — FERROUS SULFATE 325(65) MG
1 TABLET ORAL DAILY
Qty: 30 TABLET | Refills: 0 | OUTPATIENT
Start: 2017-11-29

## 2017-11-29 NOTE — TELEPHONE ENCOUNTER
Patient notified of Dr. Brady's 10/25 email and verbalized understanding stating that she forgot to mention the sticker at her last OV.  Patient reports having CHF and neuropathy and states that her feet hurt and she is out of breath when she has to walk after parking far from the door.  Please advise.

## 2017-11-29 NOTE — TELEPHONE ENCOUNTER
Noted. Patient notified of message below.  Advised to make her appointment as soon as possible as it may be several days before the next available appointment.  Verbalized understanding.

## 2017-12-22 ENCOUNTER — OFFICE VISIT (OUTPATIENT)
Dept: FAMILY MEDICINE | Facility: CLINIC | Age: 57
End: 2017-12-22
Payer: COMMERCIAL

## 2017-12-22 ENCOUNTER — TELEPHONE (OUTPATIENT)
Dept: ENDOCRINOLOGY | Facility: CLINIC | Age: 57
End: 2017-12-22

## 2017-12-22 VITALS
HEART RATE: 112 BPM | BODY MASS INDEX: 42.07 KG/M2 | DIASTOLIC BLOOD PRESSURE: 76 MMHG | TEMPERATURE: 99 F | HEIGHT: 63 IN | WEIGHT: 237.44 LBS | SYSTOLIC BLOOD PRESSURE: 136 MMHG | OXYGEN SATURATION: 97 % | RESPIRATION RATE: 18 BRPM

## 2017-12-22 DIAGNOSIS — Z79.4 TYPE 2 DIABETES MELLITUS WITH RETINOPATHY WITHOUT MACULAR EDEMA, WITH LONG-TERM CURRENT USE OF INSULIN, UNSPECIFIED LATERALITY, UNSPECIFIED RETINOPATHY SEVERITY: Chronic | ICD-10-CM

## 2017-12-22 DIAGNOSIS — I10 ESSENTIAL HYPERTENSION: Chronic | ICD-10-CM

## 2017-12-22 DIAGNOSIS — E11.319 TYPE 2 DIABETES MELLITUS WITH RETINOPATHY WITHOUT MACULAR EDEMA, WITH LONG-TERM CURRENT USE OF INSULIN, UNSPECIFIED LATERALITY, UNSPECIFIED RETINOPATHY SEVERITY: Chronic | ICD-10-CM

## 2017-12-22 DIAGNOSIS — E78.2 COMBINED HYPERLIPIDEMIA ASSOCIATED WITH TYPE 2 DIABETES MELLITUS: Primary | Chronic | ICD-10-CM

## 2017-12-22 DIAGNOSIS — G62.9 NEUROPATHY: ICD-10-CM

## 2017-12-22 DIAGNOSIS — E11.69 COMBINED HYPERLIPIDEMIA ASSOCIATED WITH TYPE 2 DIABETES MELLITUS: Primary | Chronic | ICD-10-CM

## 2017-12-22 DIAGNOSIS — M25.512 CHRONIC LEFT SHOULDER PAIN: ICD-10-CM

## 2017-12-22 DIAGNOSIS — G89.29 CHRONIC LEFT SHOULDER PAIN: ICD-10-CM

## 2017-12-22 PROCEDURE — 99999 PR PBB SHADOW E&M-EST. PATIENT-LVL III: CPT | Mod: PBBFAC,,, | Performed by: FAMILY MEDICINE

## 2017-12-22 PROCEDURE — 99214 OFFICE O/P EST MOD 30 MIN: CPT | Mod: S$GLB,,, | Performed by: FAMILY MEDICINE

## 2017-12-22 RX ORDER — TRAMADOL HYDROCHLORIDE 50 MG/1
50 TABLET ORAL 2 TIMES DAILY PRN
Qty: 60 TABLET | Refills: 2 | Status: SHIPPED | OUTPATIENT
Start: 2017-12-22 | End: 2018-05-03 | Stop reason: SDUPTHER

## 2017-12-22 NOTE — PROGRESS NOTES
Chief Complaint   Patient presents with    Diabetes    Follow-up       HPI  Michelle Donohue is a 57 y.o. female with multiple medical diagnoses as listed in the medical history and problem list that presents for follow-up for diabetes and chronic pain. She would also like a renewal of her handicap sticker for her CHF and neuropathy. She has not rescheduled her appt for endocrine as she could not afford both copays. She is also due for an eye exam.    Her foot wound has healed, but she is having increased pain in her right shoulder along with stiffness in both feet. She does exercise, but pain is limiting her ability to exercise especially in cold weather.    PAST MEDICAL HISTORY:  Past Medical History:   Diagnosis Date    Asthma     Diabetes mellitus, type 2     Diabetic neuropathy     Diabetic retinopathy     Epiretinal membrane, left eye     Heart failure     Hyperlipidemia     Hypertension     Retinal detachment     Sickle cell trait     Vitreous hemorrhage of both eyes        PAST SURGICAL HISTORY:  Past Surgical History:   Procedure Laterality Date    Avastin Injection Bilateral     BREAST BIOPSY      age 16    BREAST LUMPECTOMY      right breast    BTL      CATARACT EXTRACTION W/  INTRAOCULAR LENS IMPLANT Left 3/25/2015    OS (Dr. Montes)    CATARACT EXTRACTION W/  INTRAOCULAR LENS IMPLANT Right 6/17/15    OD ()    PANRETINAL PHOTOCOAGULATION      Both eyes       SOCIAL HISTORY:  Social History     Social History    Marital status: Single     Spouse name: N/A    Number of children: 2    Years of education: N/A     Occupational History          Social History Main Topics    Smoking status: Never Smoker    Smokeless tobacco: Never Used    Alcohol use No    Drug use: No    Sexual activity: Yes     Partners: Male      Comment: . 2 children. works as a .     Other Topics Concern    Not on file     Social History Narrative    No narrative  on file       FAMILY HISTORY:  Family History   Problem Relation Age of Onset    Heart failure Father 60         Hyperlipidemia Father 60         Hypertension Father 60         Diabetes Father 60         Diabetes Mother     Hypertension Mother     Diabetes Brother     Heart failure Brother     Hyperlipidemia Brother     Hypertension Brother        ALLERGIES AND MEDICATIONS: updated and reviewed.  Review of patient's allergies indicates:   Allergen Reactions    Lisinopril Other (See Comments)     cough     Current Outpatient Prescriptions   Medication Sig Dispense Refill    albuterol (PROVENTIL HFA/VENTOLIN HFA) 200 puff inhaler Inhale 2 puffs into the lungs every 6 (six) hours as needed.        amLODIPine (NORVASC) 10 MG tablet Take 1 tablet (10 mg total) by mouth once daily. 30 tablet 5    atorvastatin (LIPITOR) 40 MG tablet Take 1 tablet (40 mg total) by mouth once daily. 30 tablet 5    carvedilol (COREG) 6.25 MG tablet Take 1 tablet (6.25 mg total) by mouth every evening. 30 tablet 5    cloNIDine (CATAPRES) 0.1 MG tablet Take 1 tablet (0.1 mg total) by mouth 2 (two) times daily. 60 tablet 5    ferrous sulfate 325 mg (65 mg iron) Tab tablet Take 1 tablet (325 mg total) by mouth once daily. 30 tablet 0    furosemide (LASIX) 40 MG tablet Take 1 tablet (40 mg total) by mouth 2 (two) times daily. 60 tablet 5    gabapentin (NEURONTIN) 800 MG tablet Take 1 tablet (800 mg total) by mouth 2 (two) times daily. 60 tablet 5    insulin aspart (NOVOLOG) 100 unit/mL injection Inject 12 Units into the skin 3 (three) times daily before meals. 10.8 mL 11    insulin regular 100 unit/mL Inj injection Inject 25 Units into the skin as needed. 10 mL 5    losartan (COZAAR) 100 MG tablet Take 1 tablet (100 mg total) by mouth once daily. 30 tablet 5    nortriptyline (PAMELOR) 25 MG capsule Take 1 capsule (25 mg total) by mouth every evening. 30 capsule 5    potassium chloride SA  "(K-DUR,KLOR-CON) 20 MEQ tablet Take 1 tablet (20 mEq total) by mouth 2 (two) times daily. 60 tablet 5    traMADol (ULTRAM) 50 mg tablet Take 1 tablet (50 mg total) by mouth 2 (two) times daily as needed. 60 tablet 2     Current Facility-Administered Medications   Medication Dose Route Frequency Provider Last Rate Last Dose    albuterol nebulizer solution 2.5 mg  2.5 mg Nebulization 1 time in Clinic/HOD MD TYLER Huddleston  Review of Systems   Constitutional: Negative for chills, diaphoresis, fatigue, fever and unexpected weight change.   HENT: Negative for rhinorrhea, sinus pressure, sore throat and tinnitus.    Eyes: Negative for photophobia and visual disturbance.   Respiratory: Negative for cough, shortness of breath and wheezing.    Cardiovascular: Negative for chest pain and palpitations.   Gastrointestinal: Negative for abdominal pain, blood in stool, constipation, diarrhea, nausea and vomiting.   Genitourinary: Negative for dysuria, flank pain, frequency and vaginal discharge.   Musculoskeletal: Positive for arthralgias and myalgias. Negative for joint swelling.   Skin: Negative for rash.   Neurological: Negative for speech difficulty, weakness, light-headedness and headaches.   Psychiatric/Behavioral: Negative for behavioral problems and dysphoric mood.       Physical Exam  Vitals:    12/22/17 0817   BP: 136/76   Pulse: (!) 112   Resp: 18   Temp: 98.5 °F (36.9 °C)   TempSrc: Oral   SpO2: 97%   Weight: 107.7 kg (237 lb 7 oz)   Height: 5' 3" (1.6 m)    Body mass index is 42.06 kg/m².  Weight: 107.7 kg (237 lb 7 oz)   Height: 5' 3" (160 cm)     Physical Exam   Constitutional: She is oriented to person, place, and time. She appears well-developed and well-nourished.   Eyes: EOM are normal.   Neurological: She is alert and oriented to person, place, and time.   Skin: Skin is warm and dry. No rash noted. No erythema.   Right foot wound fully healed   Psychiatric: She has a normal mood and " affect. Her behavior is normal.   Nursing note and vitals reviewed.      Health Maintenance       Date Due Completion Date    Pap Smear with HPV Cotest 02/27/1982 ---    Eye Exam 02/10/2017 2/10/2016 (Done)    Override on 2/10/2016: Done    Hemoglobin A1c 12/29/2017 9/29/2017    Pneumococcal PPSV23 (High Risk) (1) 10/19/2018 (Originally 2/27/1979) ---    Pneumococcal PCV13 (High Risk) (1 - PCV13 Required) 10/16/2020 (Originally 2/27/1962) ---    Fecal Occult Blood Test (FOBT)/[employer] 09/22/2018 9/22/2017    Override on 9/22/2017: Done    Foot Exam 09/29/2018 9/29/2017 (Done)    Override on 9/29/2017: Done    Lipid Panel 09/29/2018 9/29/2017    Urine Microalbumin 09/29/2018 9/29/2017    Mammogram 10/17/2018 10/17/2017    TETANUS VACCINE 04/27/2027 4/27/2017 (Declined)    Override on 4/27/2017: Declined            ASSESSMENT     1. Combined hyperlipidemia associated with type 2 diabetes mellitus    2. Type 2 diabetes mellitus, uncontrolled, with neuropathy    3. Essential hypertension    4. Type 2 diabetes mellitus with retinopathy without macular edema, with long-term current use of insulin, unspecified laterality, unspecified retinopathy severity    5. Neuropathy    6. Chronic left shoulder pain        PLAN:     Problem List Items Addressed This Visit        Ophtho    Type 2 diabetes mellitus with retinopathy without macular edema (Chronic)  -she is going to follow up with endocrine, also needs an eye exam       Cardiac/Vascular    Essential hypertension (Chronic)  -This is a chronic medical condition that is stable under the current regimen. Continue to monitor and we will make medication adjustments as needed.       Combined hyperlipidemia associated with type 2 diabetes mellitus - Primary (Chronic)       Endocrine    Type 2 diabetes mellitus, uncontrolled, with neuropathy (Chronic)  -controlling her sugar would decrease her neuropathy    Relevant Medications    traMADol (ULTRAM) 50 mg tablet      Other Visit  Diagnoses     Neuropathy        Relevant Medications    traMADol (ULTRAM) 50 mg tablet    Chronic left shoulder pain      -discussed that once we get her diabetes under control, we will evaluate this further            Rosalie Brady MD  12/22/2017 8:40 AM        Return in about 6 weeks (around 2/2/2018) for Follow up.

## 2017-12-22 NOTE — TELEPHONE ENCOUNTER
Left message on patient's voicemail to return call to clinic regarding scheduling Diabetes management appointment with Patricia Alexandra NP for elevated A1c. Waiting to hear back.

## 2018-03-09 ENCOUNTER — NUTRITION (OUTPATIENT)
Dept: DIABETES | Facility: CLINIC | Age: 58
End: 2018-03-09
Payer: COMMERCIAL

## 2018-03-09 ENCOUNTER — LAB VISIT (OUTPATIENT)
Dept: LAB | Facility: HOSPITAL | Age: 58
End: 2018-03-09
Attending: NURSE PRACTITIONER
Payer: COMMERCIAL

## 2018-03-09 ENCOUNTER — OFFICE VISIT (OUTPATIENT)
Dept: ENDOCRINOLOGY | Facility: CLINIC | Age: 58
End: 2018-03-09
Payer: COMMERCIAL

## 2018-03-09 VITALS
DIASTOLIC BLOOD PRESSURE: 81 MMHG | SYSTOLIC BLOOD PRESSURE: 139 MMHG | HEART RATE: 109 BPM | HEIGHT: 63 IN | BODY MASS INDEX: 43.01 KG/M2 | WEIGHT: 242.75 LBS

## 2018-03-09 DIAGNOSIS — J44.89 CHRONIC OBSTRUCTIVE AIRWAY DISEASE WITH ASTHMA: Chronic | ICD-10-CM

## 2018-03-09 DIAGNOSIS — I10 ESSENTIAL HYPERTENSION: ICD-10-CM

## 2018-03-09 DIAGNOSIS — E13.319 RETINOPATHY DUE TO SECONDARY DIABETES: ICD-10-CM

## 2018-03-09 LAB
ANION GAP SERPL CALC-SCNC: 9 MMOL/L
BUN SERPL-MCNC: 13 MG/DL
CALCIUM SERPL-MCNC: 9.8 MG/DL
CHLORIDE SERPL-SCNC: 102 MMOL/L
CO2 SERPL-SCNC: 27 MMOL/L
CREAT SERPL-MCNC: 1.5 MG/DL
EST. GFR  (AFRICAN AMERICAN): 44 ML/MIN/1.73 M^2
EST. GFR  (NON AFRICAN AMERICAN): 38 ML/MIN/1.73 M^2
ESTIMATED AVG GLUCOSE: 315 MG/DL
GLUCOSE SERPL-MCNC: 260 MG/DL
HBA1C MFR BLD HPLC: 12.6 %
POTASSIUM SERPL-SCNC: 4.1 MMOL/L
SODIUM SERPL-SCNC: 138 MMOL/L

## 2018-03-09 PROCEDURE — 99204 OFFICE O/P NEW MOD 45 MIN: CPT | Mod: S$GLB,,, | Performed by: NURSE PRACTITIONER

## 2018-03-09 PROCEDURE — 3075F SYST BP GE 130 - 139MM HG: CPT | Mod: S$GLB,,, | Performed by: NURSE PRACTITIONER

## 2018-03-09 PROCEDURE — 36415 COLL VENOUS BLD VENIPUNCTURE: CPT | Mod: PN

## 2018-03-09 PROCEDURE — 99999 PR PBB SHADOW E&M-EST. PATIENT-LVL IV: CPT | Mod: PBBFAC,,, | Performed by: NURSE PRACTITIONER

## 2018-03-09 PROCEDURE — G0108 DIAB MANAGE TRN  PER INDIV: HCPCS | Mod: S$GLB,,, | Performed by: DIETITIAN, REGISTERED

## 2018-03-09 PROCEDURE — 3079F DIAST BP 80-89 MM HG: CPT | Mod: S$GLB,,, | Performed by: NURSE PRACTITIONER

## 2018-03-09 PROCEDURE — 80048 BASIC METABOLIC PNL TOTAL CA: CPT

## 2018-03-09 PROCEDURE — 83036 HEMOGLOBIN GLYCOSYLATED A1C: CPT

## 2018-03-09 NOTE — LETTER
March 9, 2018      Rosalie Brady MD  [address]  [place] [id number]           [facility]  [address], [address]  [place] [address]  Phone: [phone]  Fax: [phone]          Patient: Michelle Donohue   MR Number: [id number]   YOB: 1960   Date of Visit: 3/9/2018       Dear Dr. Rosalie Brady:    Thank you for referring Michelle Donohue to me for evaluation. Attached you will find relevant portions of my assessment and plan of care.    If you have questions, please do not hesitate to call me. I look forward to following Michelle Donohue along with you.    Sincerely,    Patricia Alexandra NP    Enclosure  CC:  No Recipients    If you would like to receive this communication electronically, please contact [email or web address] or [phone] to request more information on [employer] Link access.    For providers and/or their staff who would like to refer a patient to [facility], please contact us through our one-stop-shop provider referral line, [facility], at [phone].    If you feel you have received this communication in error or would no longer like to receive these types of communications, please e-mail [email or web address]

## 2018-03-09 NOTE — PROGRESS NOTES
"CC: This 58 y.o. Black or  female  is here for evaluation of  T2DM along with comorbidities indicated in the Visit Diagnosis section of this encounter.    HPI: Michelle Donohue was diagnosed with T2DM in the late 1980s.     New to Endocrine at [facility]. She did see Dr. Mead once in 2016 when she was switched from Novolin 70/30 to Tresiba, with regular insulin ac.     She finds that Lantus does not work after multiple attempts in the past. Last attempt was on lantus 70 units and BGs mag to HI on glucometer so she went back on mixed insulin.       LAST DIABETES EDUCATION: upon diagnosis     DIABETES MEDICATIONS: Novolin 70/30 - 70 units between 8-10 am and 50 units hs (10-11 pm)  Also takes Novolog 30 units at lunch if she thinks her BG is high or if meal was large - very rare.   Misses medication doses - Yes - misses Novolin 70/30 0-1x/week at night     DM COMPLICATIONS: retinopathy and peripheral neuropathy    SIGNIFICANT DIABETES MED HISTORY: Metformin dc'd d/t heart failure.     SELF MONITORING BLOOD GLUCOSE: Checks blood glucose at home - none.     HYPOGLYCEMIC EPISODES: none recent.      CURRENT DIET: drinks coffee w/ AS and creamer, water, and regular sodas a few times a week. If she's not eating lunch bc she doesn't like its taste, she will drink some Coke in order to avoid lows since she will be taking her insulin. Eats 3 meals/day but on the weekends skips lunch.  Likes to snack on Madison cakes.     Diet recall: dinner was spaghetti with garlic toast, water. Lunch was gumbo with rice - 1 cup, water. Breakfast was 3 donut sticks with coffee.     CURRENT EXERCISE: tries to ride on her stationary bike most days - 25 minutes each time.     SOCIAL:        /81 (BP Location: Right arm, Patient Position: Sitting, BP Method: Medium (Automatic))   Pulse 109   Ht 5' 3" (1.6 m)   Wt 110.1 kg (242 lb 11.6 oz)   LMP 11/04/2013   BMI 43.00 kg/m²     ROS:   CONSTITUTIONAL: Appetite " good, denies fatigue  SKIN: No rash or pruritis   EYES: No visual disturbances  RESPIRATORY: + shortness of breath, h/o asthma   CARDIAC: No chest pain or palpitations  GI: No nausea, vomiting, or diarrhea  : No urinary frequency or dysuria   MS: No arthralgias or mylagias   NEURO: + paresthesias to feet   PSYCH: No depression  OTHER: n/a    PHYSICAL EXAM:  GENERAL: Well developed, well nourished. No acute distress.   PSYCH: AAOx3, appropriate mood and affect, conversant, well-groomed. Judgement and insight good.   NEURO: Cranial nerves grossly intact. Speech clear, no tremor.   NECK: Trachea midline, no thyromegaly or lymphadenopathy.   CHEST: Respirations even and unlabored. CTA bilaterally.  CARDIOVASCULAR: Regular rate and rhythm. No bruits. No murmur. No edema.   ABDOMEN: Soft, non-tender, non-distended. Bowel sounds present.   MS: Gait steady. No clubbing.   SKIN: Normal skin turgor. Skin warm and dry. No areas of breakdown. + acanthosis nigricans.        Hemoglobin A1C   Date Value Ref Range Status   09/29/2017 11.8 (H) 4.0 - 5.6 % Final     Comment:     According to ADA guidelines, hemoglobin A1c <7.0% represents  optimal control in non-pregnant diabetic patients. Different  metrics may apply to specific patient populations.   Standards of Medical Care in Diabetes-2016.  For the purpose of screening for the presence of diabetes:  <5.7%     Consistent with the absence of diabetes  5.7-6.4%  Consistent with increasing risk for diabetes   (prediabetes)  >or=6.5%  Consistent with diabetes  Currently, no consensus exists for use of hemoglobin A1c  for diagnosis of diabetes for children.  This Hemoglobin A1c assay has significant interference with fetal   hemoglobin   (HbF). The results are invalid for patients with abnormal amounts of   HbF,   including those with known Hereditary Persistence   of Fetal Hemoglobin. Heterozygous hemoglobin variants (HbAS, HbAC,   HbAD, HbAE, HbA2) do not significantly interfere  with this assay;   however, presence of multiple variants in a sample may impact the %   interference.     09/29/2017 11.8 (H) 4.0 - 5.6 % Final     Comment:     According to ADA guidelines, hemoglobin A1c <7.0% represents  optimal control in non-pregnant diabetic patients. Different  metrics may apply to specific patient populations.   Standards of Medical Care in Diabetes-2016.  For the purpose of screening for the presence of diabetes:  <5.7%     Consistent with the absence of diabetes  5.7-6.4%  Consistent with increasing risk for diabetes   (prediabetes)  >or=6.5%  Consistent with diabetes  Currently, no consensus exists for use of hemoglobin A1c  for diagnosis of diabetes for children.  This Hemoglobin A1c assay has significant interference with fetal   hemoglobin   (HbF). The results are invalid for patients with abnormal amounts of   HbF,   including those with known Hereditary Persistence   of Fetal Hemoglobin. Heterozygous hemoglobin variants (HbAS, HbAC,   HbAD, HbAE, HbA2) do not significantly interfere with this assay;   however, presence of multiple variants in a sample may impact the %   interference.     01/24/2017 11.0 (H) 4.5 - 6.2 % Final     Comment:     According to ADA guidelines, hemoglobin A1C <7.0% represents  optimal control in non-pregnant diabetic patients.  Different  metrics may apply to specific populations.   Standards of Medical Care in Diabetes - 2016.  For the purpose of screening for the presence of diabetes:  <5.7%     Consistent with the absence of diabetes  5.7-6.4%  Consistent with increasing risk for diabetes   (prediabetes)  >or=6.5%  Consistent with diabetes  Currently no consensus exists for use of hemoglobin A1C  for diagnosis of diabetes for children.             Chemistry        Component Value Date/Time     09/29/2017 0835    K 4.2 09/29/2017 0835     09/29/2017 0835    CO2 25 09/29/2017 0835    BUN 16 09/29/2017 0835    CREATININE 1.5 (H) 09/29/2017 0835      (H) 09/29/2017 0835        Component Value Date/Time    CALCIUM 8.9 09/29/2017 0835    ALKPHOS 130 09/29/2017 0835    AST 17 09/29/2017 0835    ALT 17 09/29/2017 0835    BILITOT 0.3 09/29/2017 0835    ESTGFRAFRICA 44.6 (A) 09/29/2017 0835    EGFRNONAA 38.7 (A) 09/29/2017 0835          Lab Results   Component Value Date    LDLCALC 77.0 09/29/2017        Ref. Range 9/29/2017 08:35   Cholesterol Latest Ref Range: 120 - 199 mg/dL 147   HDL Latest Ref Range: 40 - 75 mg/dL 43   LDL Cholesterol Latest Ref Range: 63.0 - 159.0 mg/dL 77.0   Total Cholesterol/HDL Ratio Latest Ref Range: 2.0 - 5.0  3.4   Triglycerides Latest Ref Range: 30 - 150 mg/dL 135     Lab Results   Component Value Date    MICALBCREAT 43.4 (H) 09/29/2017           STANDARDS of CARE:        ASA:               Last eye exam:       ASSESSMENT and PLAN:    A1C GOAL: < 7.5% if no hypoglycemia       1. Uncontrolled type 2 diabetes mellitus with complication, with long-term current use of insulin  The likely reason why she has  failed Lantus vs. Mixed insulin in the past was because she was not on enough prandial insulin coverage while on Lantus. Her total daily dose (TDD) currently is 120 units vs. Only 70 units of lantus before.     For now we will focus on improving diet (which has a lot of room for improvement), timing insulin correctly, and testing BGs.     Continue Novolin 70/30 70 units before breakfast and 50 units before dinner.   Make sure to test blood sugar, inject insulin,and then eat meal within 1/2 hour of insulin injection. TIE = TEST, INJECT, EAT  Improve diet.   See Diabetes Educator/Registered Dietician for Medical Nutrition Therapy.   Increase exercise as tolerated.  Test blood sugar 3x/day - before breakfast, dinner, and bedtime.   Return to clinic in 1 month.     Labs next week; she thinks her PCP will order labs as well next week at her ov.       Hemoglobin A1c    Basic metabolic panel    Ambulatory Referral to Diabetes  Education   2. Chronic obstructive airway disease with asthma  F/u with PCP  May need preventative inhaler in addition to rescue   3. Retinopathy due to secondary diabetes  Improve glycemic control.       Make appointment for eye exam.      4. Essential hypertension  Continue current rx       Orders Placed This Encounter   Procedures    Hemoglobin A1c     Standing Status:   Future     Number of Occurrences:   1     Standing Expiration Date:   5/8/2019    Basic metabolic panel     Standing Status:   Future     Number of Occurrences:   1     Standing Expiration Date:   5/8/2019    Ambulatory Referral to Diabetes Education     Referral Priority:   Routine     Referral Type:   Consultation     Referral Reason:   Specialty Services Required     Requested Specialty:   Endocrinology     Number of Visits Requested:   1     Expiration Date:   3/9/2019        Follow-up in about 4 weeks (around 4/6/2018).     Thank you very much for allowing me to participate in Michelle Donohue's care.

## 2018-03-09 NOTE — PATIENT INSTRUCTIONS
Continue Novolin 70/30 70 units before breakfast and 50 units before dinner.   Make sure to test blood sugar, inject insulin,and then eat meal within 1/2 hour of insulin injection. TIE = TEST, INJECT, EAT  Improve diet.   See Diabetes Educator/Registered Dietician for Medical Nutrition Therapy.   Increase exercise as tolerated.  Test blood sugar 3x/day - before breakfast, dinner, and bedtime.   Return to clinic in 1 month.     Labs today.     Make appointment for eye exam.     F/u with PCP for asthma

## 2018-03-09 NOTE — PROGRESS NOTES
Diabetes Education  Author: Madison Cooper RD  Date: 3/9/2018    Diabetes Education Visit  Diabetes Education Record Assessment/Progress: Initial  Pt visit today focused on modifying present meal patterns to 3 evenly spaced meals, discussed CHO awareness and Counting, and SMBG  Diabetes Type  Diabetes Type : Type II  Diabetes History  Diabetes Diagnosis: >10 years    Nutrition-diet recall; pt states appetite is low and often does not eat much at meals  Meal Planning: 3 meals per day, skipping meals, eats out seldom, snacks between meal, drinks regular soda, water  What type of sweetener do you use?: Sweet N Low, sugar  What type of beverages do you drink?: water  Meal Plan 24 Hour Recall - Breakfast: 9am: coffee w 6 sweet n low and 2 flavored creamers,2 tsp sugar, 3 donut sticks or 1 pkg glazed mini donuts  Meal Plan 24 Hour Recall - Lunch: 1230pm: 1/3 cup chicken/sausage gumbo w rice, 1/3 c potato salad, 4 saltines, sm piece cake, water  Meal Plan 24 Hour Recall - Dinner: 9pm: 1/2-1 c meatsauce and sausage and pasta. garlic bread, water    Monitoring   Self Monitoring : 1x/day in am  Blood Glucose Logs: No  In the last month, how often have you had a low blood sugar reaction?: never  What are your symptoms of low blood sugar?: dazed, unsteady  How do you treat low blood sugar?: peanut butter on bread  Can you tell when your blood sugar is too high?: no    Exercise   Exercise Type: bike riding (rides recumbant bike 5x/wk)  Intensity: Low  Frequency: 3-5 Times per week  Duration: 30 min    Current Diabetes Treatment   Current Treatment: Insulin, Diet    Social History  Preferred Learning Method: Face to Face, Group Education  Primary Support: Self, Family  Educational Level: High School  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  Smoking Status: Never a Smoker  Alcohol Use: Never    PHQ-2 Total Score: 0  DDS-2 Score  ( > 3 = SIGNIFICANT DISTRESS): 2    Barriers to Change: None  Learning Challenges : None  Readiness to  Learn : Acceptance  Cultural Influences: No    Diabetes Education Assessment/Progress  Diabetes Disease Process (diabetes disease process and treatment options): Discussion, Individual Session, Demonstrates Understanding/Competency(verbalizes/demonstrates), Written Materials Provided, Instructed  Nutrition (Incorporating nutritional management into one's lifestyle): Discussion, Individual Session, Instructed, Comprehends Key Points, Written Materials Provided  -diet hx indicates high simple CHO intake nayeli at bkfst. Discussed changing sweetener in coffee to not include sugar and to avoid all reg sweetened beverages/flavorings. Alternate breakfast times discussed to replace donuts in a.m.  Diet recall indicates 8-9 hrs apart from lunch to dinner and often meal consumed at late night contains high CHO and high Na foods such as canned sauce and sausage. Suggestions provided on choosing lower Na+ foods; handout provided  -rec'd pt switch late evening meal to snack if needed, save the meal and consume as dinner the following evening at 5pm or bring to work for lunch.   - Reviewed eating 3 meals daily (30-45 gCHO/meal), spacing meals 4-5 hours apart, choosing appropriate sources of CHO with acceptable serving sizes, label reading and using the plate method of meal planning.  -Rec'd limiting snacks to mostly 0-5g CHO or if needs to  consume  a between meal or bedtime CHO snack,limit it to no more than 15gCHO +1oz protein/snack.    Physical Activity (incorporating physical activity into one's lifestyle): Discussion, Individual Session, Demonstrates Understanding/Competency (verbalizes/demonstrates), Written Materials Provided, Instructed  - pt reports riding bike in the evening and has noted improvement in Bg; Discussed how regular exercise helps in Bg control and need to exercise for 30 minutes duration, 3-5x/wk; pt states her goal is to exercise at least 5x    Medications (states correct name, dose, onset, peak, duration,  "side effects & timing of meds): Discussion, Individual Session, Demonstrates Understanding/Competency(verbalizes/demonstrates), Written Materials Provided, Instructed  Monitoring (monitoring blood glucose/other parameters & using results): Discussion, Individual Session, Written Materials Provided, Instructed  -Reviewed A1c and BG goals and keeping log and bringing log to clinic appts    Acute Complications (preventing, detecting, and treating acute complications): Discussion, Individual Session, Demonstrates Understanding/Competency (verbalizes/demonstrates), Written Materials Provided, Instructed  -Reviewed s/s, causes, and treatment of hyperglycemia and hypoglycemia and use of  "rule of 15" w/ hypoglycemia.Pt encourage to carry hypoglycemia treatment at all time and to have hypoglycemic treatment supplies at bedside and while out/traveling.    Chronic Complications (preventing, detecting, and treating chronic complications): Discussion, Individual Session, Demonstrates Understanding/Competency (verbalizes/demonstrates), Instructed  Clinical (diabetes, other pertinent medical history, and relevant comorbidities reviewed during visit): Discussion, Individual Session  Cognitive (knowledge of self-management skills, functional health literacy): Discussion, Individual Session  Psychosocial (emotional response to diabetes): Discussion, Individual Session  Diabetes Distress and Support Systems: Discussion, Individual Session, Demonstrates Understanding/Competency (verbalizes/demonstrates)  Behavioral (readiness for change, lifestyle practices, self-care behaviors): Discussion, Individual Session    Goals  Patient has selected/evaluated goals during today's session: Yes, selected  Healthy Eating: Set (consume 3 evenly spaced meals, 30-45gCHO per meal)  Start Date: 03/09/18    Diabetes Care Plan/Intervention  Education Plan/Intervention: Individual Follow-Up DSMT    Diabetes Meal Plan  Restrictions: Restricted " Carbohydrate, Low Sodium    Education Units of Time   Time Spent: 60 min    Health Maintenance was reviewed today with patient. Discussed with patient importance of routine eye exams, foot exams/foot care, blood work (i.e.: A1c, microalbumin, and lipid), dental visits, yearly flu vaccine, and pneumonia vaccine as indicated by PCP. Patient verbalized understanding.     Health Maintenance Topics with due status: Not Due       Topic Last Completion Date    TETANUS VACCINE 04/27/2017    Fecal Occult Blood Test (FOBT)/[facility] 09/22/2017    Foot Exam 09/29/2017    Lipid Panel 09/29/2017    Urine Microalbumin 09/29/2017    Mammogram 10/17/2017    Low Dose Statin 03/09/2018     Health Maintenance Due   Topic Date Due    Pap Smear with HPV Cotest  02/27/1982    Eye Exam  02/10/2017    Hemoglobin A1c  12/29/2017

## 2018-03-20 ENCOUNTER — TELEPHONE (OUTPATIENT)
Dept: ENDOCRINOLOGY | Facility: CLINIC | Age: 58
End: 2018-03-20

## 2018-03-20 ENCOUNTER — PATIENT MESSAGE (OUTPATIENT)
Dept: ENDOCRINOLOGY | Facility: CLINIC | Age: 58
End: 2018-03-20

## 2018-03-20 NOTE — TELEPHONE ENCOUNTER
bg log received. Pt reports her BG was 96 Sunday morning bc she took an extra 30 units of insulin the night before. And has been skipping dessert at lunch which accounts for BGs of 150 predinner. Advised her to increase insulin to 60 units before dinner and continue 70 units before breakfast. Do not take extra doses of insulin. Send log in a week. She verbalized understanding.

## 2018-03-29 ENCOUNTER — TELEPHONE (OUTPATIENT)
Dept: ENDOCRINOLOGY | Facility: CLINIC | Age: 58
End: 2018-03-29

## 2018-03-29 NOTE — TELEPHONE ENCOUNTER
bg log received. She is not skipping insulin doses. Taking 70 units of novolin 70/30 before breakfast and 60 units before dinner. Avoiding sodas. Dinner last night was 1/3 cup of potatoes, meatballs, and diet tea with some veg. FBG today was 242.     Advised her to take 80 units before breakfast and 70 units before dinner. Best to split each dose into two separate injections each time to promote absorption.   Keep f/u appt in 1.5 weeks. May need to change insulin regimen if BG still uncontrolled.

## 2018-04-16 ENCOUNTER — TELEPHONE (OUTPATIENT)
Dept: ENDOCRINOLOGY | Facility: CLINIC | Age: 58
End: 2018-04-16

## 2018-04-16 NOTE — TELEPHONE ENCOUNTER
Left message for patient to return call to clinic to reschedule missed follow up. Waiting to hear back.

## 2018-04-18 ENCOUNTER — TELEPHONE (OUTPATIENT)
Dept: RADIOLOGY | Facility: HOSPITAL | Age: 58
End: 2018-04-18

## 2018-05-03 ENCOUNTER — PATIENT MESSAGE (OUTPATIENT)
Dept: FAMILY MEDICINE | Facility: CLINIC | Age: 58
End: 2018-05-03

## 2018-05-03 DIAGNOSIS — G62.9 NEUROPATHY: ICD-10-CM

## 2018-05-03 RX ORDER — TRAMADOL HYDROCHLORIDE 50 MG/1
50 TABLET ORAL 2 TIMES DAILY PRN
Qty: 60 TABLET | Refills: 2 | OUTPATIENT
Start: 2018-05-03

## 2018-05-03 RX ORDER — TRAMADOL HYDROCHLORIDE 50 MG/1
50 TABLET ORAL 2 TIMES DAILY PRN
Qty: 10 TABLET | Refills: 0 | Status: SHIPPED | OUTPATIENT
Start: 2018-05-03 | End: 2018-05-09 | Stop reason: SDUPTHER

## 2018-05-03 NOTE — TELEPHONE ENCOUNTER
Michelle needs an appointment, please schedule within two weeks. Will give enough medication to get to appointment.

## 2018-05-09 ENCOUNTER — OFFICE VISIT (OUTPATIENT)
Dept: FAMILY MEDICINE | Facility: CLINIC | Age: 58
End: 2018-05-09
Payer: COMMERCIAL

## 2018-05-09 VITALS
HEIGHT: 63 IN | SYSTOLIC BLOOD PRESSURE: 134 MMHG | RESPIRATION RATE: 18 BRPM | OXYGEN SATURATION: 97 % | HEART RATE: 100 BPM | DIASTOLIC BLOOD PRESSURE: 74 MMHG | TEMPERATURE: 98 F | WEIGHT: 237 LBS | BODY MASS INDEX: 41.99 KG/M2

## 2018-05-09 DIAGNOSIS — G62.9 NEUROPATHY: ICD-10-CM

## 2018-05-09 DIAGNOSIS — J30.9 ALLERGIC RHINITIS, UNSPECIFIED SEASONALITY, UNSPECIFIED TRIGGER: ICD-10-CM

## 2018-05-09 DIAGNOSIS — I10 ESSENTIAL HYPERTENSION: Primary | Chronic | ICD-10-CM

## 2018-05-09 DIAGNOSIS — E87.6 HYPOKALEMIA: ICD-10-CM

## 2018-05-09 DIAGNOSIS — R22.1 MASS IN NECK: ICD-10-CM

## 2018-05-09 PROCEDURE — 3008F BODY MASS INDEX DOCD: CPT | Mod: CPTII,S$GLB,, | Performed by: FAMILY MEDICINE

## 2018-05-09 PROCEDURE — 99214 OFFICE O/P EST MOD 30 MIN: CPT | Mod: S$GLB,,, | Performed by: FAMILY MEDICINE

## 2018-05-09 PROCEDURE — 99999 PR PBB SHADOW E&M-EST. PATIENT-LVL IV: CPT | Mod: PBBFAC,,, | Performed by: FAMILY MEDICINE

## 2018-05-09 PROCEDURE — 3046F HEMOGLOBIN A1C LEVEL >9.0%: CPT | Mod: CPTII,S$GLB,, | Performed by: FAMILY MEDICINE

## 2018-05-09 PROCEDURE — 3078F DIAST BP <80 MM HG: CPT | Mod: CPTII,S$GLB,, | Performed by: FAMILY MEDICINE

## 2018-05-09 PROCEDURE — 3075F SYST BP GE 130 - 139MM HG: CPT | Mod: CPTII,S$GLB,, | Performed by: FAMILY MEDICINE

## 2018-05-09 RX ORDER — AMLODIPINE BESYLATE 10 MG/1
10 TABLET ORAL DAILY
Qty: 30 TABLET | Refills: 5 | Status: SHIPPED | OUTPATIENT
Start: 2018-05-09 | End: 2018-11-14 | Stop reason: SDUPTHER

## 2018-05-09 RX ORDER — NORTRIPTYLINE HYDROCHLORIDE 25 MG/1
25 CAPSULE ORAL NIGHTLY
Qty: 30 CAPSULE | Refills: 5 | Status: SHIPPED | OUTPATIENT
Start: 2018-05-09 | End: 2018-11-12 | Stop reason: SDUPTHER

## 2018-05-09 RX ORDER — POTASSIUM CHLORIDE 20 MEQ/1
20 TABLET, EXTENDED RELEASE ORAL 2 TIMES DAILY
Qty: 60 TABLET | Refills: 5 | Status: SHIPPED | OUTPATIENT
Start: 2018-05-09 | End: 2019-01-01 | Stop reason: SDUPTHER

## 2018-05-09 RX ORDER — FUROSEMIDE 40 MG/1
40 TABLET ORAL 2 TIMES DAILY
Qty: 60 TABLET | Refills: 5 | Status: SHIPPED | OUTPATIENT
Start: 2018-05-09 | End: 2019-02-13 | Stop reason: SDUPTHER

## 2018-05-09 RX ORDER — CLONIDINE HYDROCHLORIDE 0.1 MG/1
0.1 TABLET ORAL 2 TIMES DAILY
Qty: 60 TABLET | Refills: 5 | Status: SHIPPED | OUTPATIENT
Start: 2018-05-09 | End: 2019-01-01 | Stop reason: SDUPTHER

## 2018-05-09 RX ORDER — TRAMADOL HYDROCHLORIDE 50 MG/1
50 TABLET ORAL EVERY 12 HOURS PRN
Qty: 60 TABLET | Refills: 2 | Status: SHIPPED | OUTPATIENT
Start: 2018-05-09 | End: 2018-09-17 | Stop reason: SDUPTHER

## 2018-05-09 RX ORDER — GABAPENTIN 800 MG/1
800 TABLET ORAL 2 TIMES DAILY
Qty: 60 TABLET | Refills: 5 | Status: SHIPPED | OUTPATIENT
Start: 2018-05-09 | End: 2018-11-12 | Stop reason: SDUPTHER

## 2018-05-09 RX ORDER — CARVEDILOL 6.25 MG/1
6.25 TABLET ORAL NIGHTLY
Qty: 30 TABLET | Refills: 5 | Status: SHIPPED | OUTPATIENT
Start: 2018-05-09 | End: 2019-02-13 | Stop reason: SDUPTHER

## 2018-05-09 RX ORDER — ATORVASTATIN CALCIUM 40 MG/1
40 TABLET, FILM COATED ORAL DAILY
Qty: 30 TABLET | Refills: 5 | Status: SHIPPED | OUTPATIENT
Start: 2018-05-09 | End: 2019-02-13 | Stop reason: SDUPTHER

## 2018-05-09 RX ORDER — LOSARTAN POTASSIUM 100 MG/1
100 TABLET ORAL DAILY
Qty: 30 TABLET | Refills: 5 | Status: SHIPPED | OUTPATIENT
Start: 2018-05-09 | End: 2018-11-12 | Stop reason: SDUPTHER

## 2018-05-09 NOTE — PROGRESS NOTES
Chief Complaint   Patient presents with    Diabetes    Follow-up    knot in neck       HPI  Michelle Donohue is a 58 y.o. female with multiple medical diagnoses as listed in the medical history and problem list that presents for follow-up for diabetes. She is also having a mass in her neck that has been present for several weeks. It is decreasing in size but still palpable. She does not have any trouble with swallowing and food does not get stuck.     She reports sugars in the 120s-150s AM and prandials in the 170s-200s. She has been making dietary changes to improve her blood sugar and has even lost some weight.    PAST MEDICAL HISTORY:  Past Medical History:   Diagnosis Date    Asthma     Diabetes mellitus, type 2     Diabetic neuropathy     Diabetic retinopathy     Epiretinal membrane, left eye     Heart failure     Hyperlipidemia     Hypertension     Retinal detachment     Sickle cell trait     Vitreous hemorrhage of both eyes        PAST SURGICAL HISTORY:  Past Surgical History:   Procedure Laterality Date    Avastin Injection Bilateral     BREAST BIOPSY      age 16    BREAST LUMPECTOMY      right breast    BTL      CATARACT EXTRACTION W/  INTRAOCULAR LENS IMPLANT Left 3/25/2015    OS (Dr. Montes)    CATARACT EXTRACTION W/  INTRAOCULAR LENS IMPLANT Right 6/17/15    OD ()    PANRETINAL PHOTOCOAGULATION      Both eyes       SOCIAL HISTORY:  Social History     Social History    Marital status: Single     Spouse name: N/A    Number of children: 2    Years of education: N/A     Occupational History          Social History Main Topics    Smoking status: Never Smoker    Smokeless tobacco: Never Used    Alcohol use No    Drug use: No    Sexual activity: Yes     Partners: Male      Comment: . 2 children. works as a .     Other Topics Concern    Not on file     Social History Narrative    No narrative on file       FAMILY HISTORY:  Family  History   Problem Relation Age of Onset    Heart failure Father 60            Hyperlipidemia Father 60            Hypertension Father 60            Heart attack Father     Diabetes Mother     Hypertension Mother     Heart failure Mother     Diabetes Brother     Heart failure Brother     Hyperlipidemia Brother     Hypertension Brother     Heart failure Sister        ALLERGIES AND MEDICATIONS: updated and reviewed.  Review of patient's allergies indicates:   Allergen Reactions    Lisinopril Other (See Comments)     cough     Current Outpatient Prescriptions   Medication Sig Dispense Refill    albuterol (PROVENTIL HFA/VENTOLIN HFA) 200 puff inhaler Inhale 2 puffs into the lungs every 6 (six) hours as needed.        amLODIPine (NORVASC) 10 MG tablet Take 1 tablet (10 mg total) by mouth once daily. 30 tablet 5    atorvastatin (LIPITOR) 40 MG tablet Take 1 tablet (40 mg total) by mouth once daily. 30 tablet 5    carvedilol (COREG) 6.25 MG tablet Take 1 tablet (6.25 mg total) by mouth every evening. 30 tablet 5    cloNIDine (CATAPRES) 0.1 MG tablet Take 1 tablet (0.1 mg total) by mouth 2 (two) times daily. 60 tablet 5    ferrous sulfate 325 mg (65 mg iron) Tab tablet Take 1 tablet (325 mg total) by mouth once daily. 30 tablet 0    furosemide (LASIX) 40 MG tablet Take 1 tablet (40 mg total) by mouth 2 (two) times daily. 60 tablet 5    gabapentin (NEURONTIN) 800 MG tablet Take 1 tablet (800 mg total) by mouth 2 (two) times daily. 60 tablet 5    insulin NPH-insulin regular, 70/30, (NOVOLIN 70/30) 100 unit/mL (70-30) injection Inject into the skin 2 (two) times daily. 80 units before breakfast and 70 units before dinner       losartan (COZAAR) 100 MG tablet Take 1 tablet (100 mg total) by mouth once daily. 30 tablet 5    nortriptyline (PAMELOR) 25 MG capsule Take 1 capsule (25 mg total) by mouth every evening. 30 capsule 5    potassium chloride SA (K-DUR,KLOR-CON) 20 MEQ tablet  "Take 1 tablet (20 mEq total) by mouth 2 (two) times daily. 60 tablet 5    traMADol (ULTRAM) 50 mg tablet Take 1 tablet (50 mg total) by mouth every 12 (twelve) hours as needed. 60 tablet 2     Current Facility-Administered Medications   Medication Dose Route Frequency Provider Last Rate Last Dose    albuterol nebulizer solution 2.5 mg  2.5 mg Nebulization 1 time in Clinic/HOD MD TYLER Huddl[place]  Review of Systems   Constitutional: Negative for chills, diaphoresis, fatigue, fever and unexpected weight change.   HENT: Negative for rhinorrhea, sinus pressure, sore throat and tinnitus.    Eyes: Negative for photophobia and visual disturbance.   Respiratory: Negative for cough, shortness of breath and wheezing.    Cardiovascular: Negative for chest pain and palpitations.   Gastrointestinal: Negative for abdominal pain, blood in stool, constipation, diarrhea, nausea and vomiting.   Genitourinary: Negative for dysuria, flank pain, frequency and vaginal discharge.   Musculoskeletal: Negative for arthralgias and joint swelling.   Skin: Negative for rash.   Neurological: Negative for speech difficulty, weakness, light-headedness and headaches.   Psychiatric/Behavioral: Negative for behavioral problems and dysphoric mood.       Physical Exam  Vitals:    05/09/18 1209   BP: 134/74   Pulse: 100   Resp: 18   Temp: 98.2 °F (36.8 °C)   TempSrc: Oral   SpO2: 97%   Weight: 107.5 kg (237 lb)   Height: 5' 3" (1.6 m)    Body mass index is 41.98 kg/m².  Weight: 107.5 kg (237 lb)   Height: 5' 3" (160 cm)     Physical Exam   Constitutional: She is oriented to person, place, and time. She appears well-developed and well-nourished. No distress.   HENT:   Head: Normocephalic and atraumatic.   Eyes: EOM are normal.   Neck: Neck supple. Thyromegaly present.       Cardiovascular: Normal rate and regular rhythm.  Exam reveals no gallop and no friction rub.    No murmur heard.  Pulmonary/Chest: Effort normal and breath sounds " normal. No respiratory distress. She has no wheezes. She has no rales.   Lymphadenopathy:     She has no cervical adenopathy.   Neurological: She is alert and oriented to person, place, and time.   Skin: Skin is warm and dry. No rash noted.   Psychiatric: She has a normal mood and affect. Her behavior is normal.   Nursing note and vitals reviewed.      Health Maintenance       Date Due Completion Date    Pap Smear with HPV Cotest 02/27/1982 ---    Eye Exam 02/10/2017 2/10/2016 (Done)    Override on 2/10/2016: Done    Pneumococcal PPSV23 (High Risk) (1) 10/19/2018 (Originally 2/27/1979) ---    Pneumococcal PCV13 (High Risk) (1 - PCV13 Required) 10/16/2020 (Originally 2/27/1962) ---    Hemoglobin A1c 06/09/2018 3/9/2018    Influenza Vaccine 08/01/2018 10/12/2017 (Done)    Override on 10/12/2017: Done    Override on 10/12/2016: Done    Override on 10/29/2015: Done    Override on 10/1/2014: Done    Fecal Occult Blood Test (FOBT)/[employer] 09/22/2018 9/22/2017    Override on 9/22/2017: Done    Foot Exam 09/29/2018 9/29/2017 (Done)    Override on 9/29/2017: Done    Lipid Panel 09/29/2018 9/29/2017    Urine Microalbumin 09/29/2018 9/29/2017    Mammogram 10/17/2018 10/17/2017    Low Dose Statin 05/09/2019 5/9/2018    TETANUS VACCINE 04/27/2027 4/27/2017 (Declined)    Override on 4/27/2017: Declined            ASSESSMENT     1. Essential hypertension    2. Type 2 diabetes mellitus, uncontrolled, with neuropathy    3. Hypokalemia    4. Neuropathy    5. Allergic rhinitis, unspecified seasonality, unspecified trigger    6. Mass in neck        PLAN:     Problem List Items Addressed This Visit        Cardiac/Vascular    Essential hypertension - Primary (Chronic)    Relevant Medications    amLODIPine (NORVASC) 10 MG tablet    carvedilol (COREG) 6.25 MG tablet    cloNIDine (CATAPRES) 0.1 MG tablet    losartan (COZAAR) 100 MG tablet       Renal/    Hypokalemia    Relevant Medications    potassium chloride SA (K-DUR,KLOR-CON) 20 MEQ  tablet       Endocrine    Type 2 diabetes mellitus, uncontrolled, with neuropathy (Chronic)  -continue working towards improvement, recommend she follow up with endocrine as scheduled    Relevant Medications    gabapentin (NEURONTIN) 800 MG tablet    nortriptyline (PAMELOR) 25 MG capsule    traMADol (ULTRAM) 50 mg tablet      Other Visit Diagnoses     Neuropathy        Relevant Medications    traMADol (ULTRAM) 50 mg tablet    Allergic rhinitis, unspecified seasonality, unspecified trigger      -she has been having several weeks of post nasal drip, a possible cause if this is a lymph node    Mass in neck      -may be a lymph node but is right over her thyroid approx 0.5 cm in size in the center    Relevant Orders    US Soft Tissue Head Neck Thyroid            Rosalie Brady MD  05/09/2018 4:02 PM        Follow-up in about 3 months (around 8/9/2018) for Follow up.

## 2018-05-16 ENCOUNTER — OFFICE VISIT (OUTPATIENT)
Dept: ENDOCRINOLOGY | Facility: CLINIC | Age: 58
End: 2018-05-16
Payer: COMMERCIAL

## 2018-05-16 VITALS
BODY MASS INDEX: 42.5 KG/M2 | HEART RATE: 104 BPM | HEIGHT: 63 IN | DIASTOLIC BLOOD PRESSURE: 80 MMHG | WEIGHT: 239.88 LBS | SYSTOLIC BLOOD PRESSURE: 135 MMHG

## 2018-05-16 DIAGNOSIS — E66.01 MORBID OBESITY, UNSPECIFIED OBESITY TYPE: ICD-10-CM

## 2018-05-16 DIAGNOSIS — E13.319 RETINOPATHY DUE TO SECONDARY DIABETES: ICD-10-CM

## 2018-05-16 DIAGNOSIS — I10 ESSENTIAL HYPERTENSION: ICD-10-CM

## 2018-05-16 PROCEDURE — 99214 OFFICE O/P EST MOD 30 MIN: CPT | Mod: S$GLB,,, | Performed by: NURSE PRACTITIONER

## 2018-05-16 PROCEDURE — 3079F DIAST BP 80-89 MM HG: CPT | Mod: CPTII,S$GLB,, | Performed by: NURSE PRACTITIONER

## 2018-05-16 PROCEDURE — 3046F HEMOGLOBIN A1C LEVEL >9.0%: CPT | Mod: CPTII,S$GLB,, | Performed by: NURSE PRACTITIONER

## 2018-05-16 PROCEDURE — 99999 PR PBB SHADOW E&M-EST. PATIENT-LVL IV: CPT | Mod: PBBFAC,,, | Performed by: NURSE PRACTITIONER

## 2018-05-16 PROCEDURE — 3008F BODY MASS INDEX DOCD: CPT | Mod: CPTII,S$GLB,, | Performed by: NURSE PRACTITIONER

## 2018-05-16 PROCEDURE — 3075F SYST BP GE 130 - 139MM HG: CPT | Mod: CPTII,S$GLB,, | Performed by: NURSE PRACTITIONER

## 2018-05-16 NOTE — PATIENT INSTRUCTIONS
Continue same insulin doses.   Restart exercise.   Continue working on diet.   Try testing blood sugar more often ideally 3x/day.   Return to clinic in 6 weeks with written blood sugar.   Labs tomorrow.     Bydureon works by lowering glucoses whenever you eat carbohydrates. Therefore, it has a very low chance of lowering your glucose too much (i.e. Low risk of hypoglycemia). It also reduces appetite and decreases the rate of digestion, which may lead to some weight loss. The side effects include nausea and upset stomach. Risks include pancreatitis. Go to ER if having severe abdominal pain, nausea or vomiting.    In rodent studies, there was an increased risk for thyroid c-cell tumors; Risk in humans for medullary thyroid cancer. Do not take if you have personal or family history of thyroid medullary cancer or MEN2.

## 2018-05-16 NOTE — PROGRESS NOTES
CC: This 58 y.o. Black or  female  is here for evaluation of  T2DM along with comorbidities indicated in the Visit Diagnosis section of this encounter.    HPI: Michelle Donohue was diagnosed with T2DM in the late 1980s.     Initial visit on 3/9/18  New to Endocrine at [facility]. She did see Dr. Mead once in 2016 when she was switched from Novolin 70/30 to Tresiba, with regular insulin ac.   She finds that Lantus does not work after multiple attempts in the past. Last attempt was on lantus 70 units and BGs mag to HI on glucometer so she went back on mixed insulin.   Plan The likely reason why she has  failed Lantus vs. Mixed insulin in the past was because she was not on enough prandial insulin coverage while on Lantus. Her total daily dose (TDD) currently is 120 units vs. Only 70 units of lantus before.   For now we will focus on improving diet (which has a lot of room for improvement), timing insulin correctly, and testing BGs.   Continue Novolin 70/30 70 units before breakfast and 50 units before dinner.   Make sure to test blood sugar, inject insulin,and then eat meal within 1/2 hour of insulin injection. TIE = TEST, INJECT, EAT  Improve diet.   See Diabetes Educator/Registered Dietician for Medical Nutrition Therapy.   Increase exercise as tolerated.  Test blood sugar 3x/day - before breakfast, dinner, and bedtime.   Return to clinic in 1 month.   Labs next week; she thinks her PCP will order labs as well next week at her ov.     Interval history  Found visit with dietician helpful. She has been eating salads daily for lunch. Has lost 3 lb since lov. Stopped donut sticks in the morning. Breakfast is raisin bread. Hs been skipping her dessert.   Feels better in general.       LAST DIABETES EDUCATION: 3/9/18     DIABETES MEDICATIONS: Novolin 70/30 - 70 units before breakfast and 80 units before supper    Misses medication doses - none  Purchases insulin vials from [employer] so monthly cost is >  "$100.       DM COMPLICATIONS: retinopathy and peripheral neuropathy    SIGNIFICANT DIABETES MED HISTORY: Metformin dc'd d/t heart failure.     SELF MONITORING BLOOD GLUCOSE: uses testing supplies from work. Checks blood glucose at home - glucoses transcribed as follows:              HYPOGLYCEMIC EPISODES: none recent.      CURRENT DIET: drinks coffee w/ AS and creamer, water, crystal lite. no regular sodas.   Eats 3 meals/day but on the weekends skips lunch.     CURRENT EXERCISE: none recent - stationary bike accidentally discarded.     SOCIAL: ; motivated to be healthy for grandparents.       /80 (BP Location: Left arm, Patient Position: Sitting, BP Method: Large (Automatic))   Pulse 104   Ht 5' 3" (1.6 m)   Wt 108.8 kg (239 lb 13.8 oz)   LMP 11/04/2013   BMI 42.49 kg/m²       ROS:   CONSTITUTIONAL: Appetite good, denies fatigue  CARDIAC: No chest pain or palpitations  GI: No nausea, vomiting, or diarrhea  OTHER: n/a      PHYSICAL EXAM:  GENERAL: Well developed, well nourished. No acute distress.   PSYCH: AAOx3, appropriate mood and affect, conversant, well-groomed. Judgement and insight good.   NEURO: Cranial nerves grossly intact. Speech clear, no tremor.   CHEST: Respirations even and unlabored.         Hemoglobin A1C   Date Value Ref Range Status   03/09/2018 12.6 (H) 4.0 - 5.6 % Final     Comment:     According to ADA guidelines, hemoglobin A1c <7.0% represents  optimal control in non-pregnant diabetic patients. Different  metrics may apply to specific patient populations.   Standards of Medical Care in Diabetes-2016.  For the purpose of screening for the presence of diabetes:  <5.7%     Consistent with the absence of diabetes  5.7-6.4%  Consistent with increasing risk for diabetes   (prediabetes)  >or=6.5%  Consistent with diabetes  Currently, no consensus exists for use of hemoglobin A1c  for diagnosis of diabetes for children.  This Hemoglobin A1c assay has significant interference " with fetal   hemoglobin   (HbF). The results are invalid for patients with abnormal amounts of   HbF,   including those with known Hereditary Persistence   of Fetal Hemoglobin. Heterozygous hemoglobin variants (HbAS, HbAC,   HbAD, HbAE, HbA2) do not significantly interfere with this assay;   however, presence of multiple variants in a sample may impact the %   interference.     09/29/2017 11.8 (H) 4.0 - 5.6 % Final     Comment:     According to ADA guidelines, hemoglobin A1c <7.0% represents  optimal control in non-pregnant diabetic patients. Different  metrics may apply to specific patient populations.   Standards of Medical Care in Diabetes-2016.  For the purpose of screening for the presence of diabetes:  <5.7%     Consistent with the absence of diabetes  5.7-6.4%  Consistent with increasing risk for diabetes   (prediabetes)  >or=6.5%  Consistent with diabetes  Currently, no consensus exists for use of hemoglobin A1c  for diagnosis of diabetes for children.  This Hemoglobin A1c assay has significant interference with fetal   hemoglobin   (HbF). The results are invalid for patients with abnormal amounts of   HbF,   including those with known Hereditary Persistence   of Fetal Hemoglobin. Heterozygous hemoglobin variants (HbAS, HbAC,   HbAD, HbAE, HbA2) do not significantly interfere with this assay;   however, presence of multiple variants in a sample may impact the %   interference.     09/29/2017 11.8 (H) 4.0 - 5.6 % Final     Comment:     According to ADA guidelines, hemoglobin A1c <7.0% represents  optimal control in non-pregnant diabetic patients. Different  metrics may apply to specific patient populations.   Standards of Medical Care in Diabetes-2016.  For the purpose of screening for the presence of diabetes:  <5.7%     Consistent with the absence of diabetes  5.7-6.4%  Consistent with increasing risk for diabetes   (prediabetes)  >or=6.5%  Consistent with diabetes  Currently, no consensus exists for use of  hemoglobin A1c  for diagnosis of diabetes for children.  This Hemoglobin A1c assay has significant interference with fetal   hemoglobin   (HbF). The results are invalid for patients with abnormal amounts of   HbF,   including those with known Hereditary Persistence   of Fetal Hemoglobin. Heterozygous hemoglobin variants (HbAS, HbAC,   HbAD, HbAE, HbA2) do not significantly interfere with this assay;   however, presence of multiple variants in a sample may impact the %   interference.             Chemistry        Component Value Date/Time     03/09/2018 0821    K 4.1 03/09/2018 0821     03/09/2018 0821    CO2 27 03/09/2018 0821    BUN 13 03/09/2018 0821    CREATININE 1.5 (H) 03/09/2018 0821     (H) 03/09/2018 0821        Component Value Date/Time    CALCIUM 9.8 03/09/2018 0821    ALKPHOS 130 09/29/2017 0835    AST 17 09/29/2017 0835    ALT 17 09/29/2017 0835    BILITOT 0.3 09/29/2017 0835    ESTGFRAFRICA 44 (A) 03/09/2018 0821    EGFRNONAA 38 (A) 03/09/2018 0821          Lab Results   Component Value Date    LDLCALC 77.0 09/29/2017        Ref. Range 9/29/2017 08:35   Cholesterol Latest Ref Range: 120 - 199 mg/dL 147   HDL Latest Ref Range: 40 - 75 mg/dL 43   LDL Cholesterol Latest Ref Range: 63.0 - 159.0 mg/dL 77.0   Total Cholesterol/HDL Ratio Latest Ref Range: 2.0 - 5.0  3.4   Triglycerides Latest Ref Range: 30 - 150 mg/dL 135     Lab Results   Component Value Date    MICALBCREAT 43.4 (H) 09/29/2017           STANDARDS of CARE:        ASA:               Last eye exam:       ASSESSMENT and PLAN:    A1C GOAL: < 7.5% if no hypoglycemia     1. Uncontrolled type 2 diabetes mellitus with complication, with long-term current use of insulin  Start bydureon.   Continue same insulin doses.   Restart exercise.   Continue working on diet.   Try testing blood sugar more often ideally 3x/day.   Return to clinic in 6 weeks with written blood sugar.   Labs tomorrow.     Hemoglobin A1c    Basic metabolic panel    2. Retinopathy due to secondary diabetes  Improve glycemic control.      3. Essential hypertension  controlled   4. Morbid obesity, unspecified obesity type  As above.        Orders Placed This Encounter   Procedures    Hemoglobin A1c     Standing Status:   Future     Standing Expiration Date:   7/15/2019    Basic metabolic panel     Standing Status:   Future     Standing Expiration Date:   7/15/2019        Follow-up in about 6 weeks (around 6/27/2018).

## 2018-05-17 ENCOUNTER — PATIENT MESSAGE (OUTPATIENT)
Dept: ENDOCRINOLOGY | Facility: CLINIC | Age: 58
End: 2018-05-17

## 2018-05-17 ENCOUNTER — LAB VISIT (OUTPATIENT)
Dept: LAB | Facility: HOSPITAL | Age: 58
End: 2018-05-17
Attending: NURSE PRACTITIONER
Payer: COMMERCIAL

## 2018-05-17 LAB
ANION GAP SERPL CALC-SCNC: 15 MMOL/L
BUN SERPL-MCNC: 11 MG/DL
CALCIUM SERPL-MCNC: 9.4 MG/DL
CHLORIDE SERPL-SCNC: 108 MMOL/L
CO2 SERPL-SCNC: 18 MMOL/L
CREAT SERPL-MCNC: 1.3 MG/DL
EST. GFR  (AFRICAN AMERICAN): 52.6 ML/MIN/1.73 M^2
EST. GFR  (NON AFRICAN AMERICAN): 45.6 ML/MIN/1.73 M^2
ESTIMATED AVG GLUCOSE: 258 MG/DL
GLUCOSE SERPL-MCNC: 184 MG/DL
HBA1C MFR BLD HPLC: 10.6 %
POTASSIUM SERPL-SCNC: 4.1 MMOL/L
SODIUM SERPL-SCNC: 141 MMOL/L

## 2018-05-17 PROCEDURE — 83036 HEMOGLOBIN GLYCOSYLATED A1C: CPT

## 2018-05-17 PROCEDURE — 80048 BASIC METABOLIC PNL TOTAL CA: CPT

## 2018-05-17 PROCEDURE — 36415 COLL VENOUS BLD VENIPUNCTURE: CPT | Mod: PO

## 2018-05-17 NOTE — TELEPHONE ENCOUNTER
PA started for Bydureon since there was no alternative.  Approved from 5/17/18 - 5/17/19 Ref# [id number]. Pharmacy and patient notified.

## 2018-05-29 ENCOUNTER — HOSPITAL ENCOUNTER (OUTPATIENT)
Dept: RADIOLOGY | Facility: HOSPITAL | Age: 58
Discharge: HOME | End: 2018-05-29
Attending: FAMILY MEDICINE
Payer: COMMERCIAL

## 2018-05-29 DIAGNOSIS — R22.1 MASS IN NECK: ICD-10-CM

## 2018-05-29 PROCEDURE — 76536 US EXAM OF HEAD AND NECK: CPT | Mod: TC

## 2018-05-29 PROCEDURE — 76536 US EXAM OF HEAD AND NECK: CPT | Mod: 26,,, | Performed by: RADIOLOGY

## 2018-05-30 ENCOUNTER — PATIENT MESSAGE (OUTPATIENT)
Dept: FAMILY MEDICINE | Facility: CLINIC | Age: 58
End: 2018-05-30

## 2018-05-31 ENCOUNTER — PATIENT MESSAGE (OUTPATIENT)
Dept: FAMILY MEDICINE | Facility: CLINIC | Age: 58
End: 2018-05-31

## 2018-07-05 ENCOUNTER — TELEPHONE (OUTPATIENT)
Dept: ENDOCRINOLOGY | Facility: CLINIC | Age: 58
End: 2018-07-05

## 2018-07-05 NOTE — TELEPHONE ENCOUNTER
Attempted to confirm appointment with patient but patient was unavailable when I called, and could not leave a message.

## 2018-08-07 ENCOUNTER — APPOINTMENT (OUTPATIENT)
Dept: RADIOLOGY | Facility: HOSPITAL | Age: 58
End: 2018-08-07
Attending: FAMILY MEDICINE
Payer: COMMERCIAL

## 2018-08-07 ENCOUNTER — OFFICE VISIT (OUTPATIENT)
Dept: FAMILY MEDICINE | Facility: CLINIC | Age: 58
End: 2018-08-07
Payer: COMMERCIAL

## 2018-08-07 VITALS
HEART RATE: 103 BPM | WEIGHT: 231 LBS | DIASTOLIC BLOOD PRESSURE: 66 MMHG | RESPIRATION RATE: 18 BRPM | SYSTOLIC BLOOD PRESSURE: 142 MMHG | TEMPERATURE: 99 F | OXYGEN SATURATION: 97 % | HEIGHT: 63 IN | BODY MASS INDEX: 40.93 KG/M2

## 2018-08-07 DIAGNOSIS — M25.511 ACUTE PAIN OF BOTH SHOULDERS: ICD-10-CM

## 2018-08-07 DIAGNOSIS — I10 ESSENTIAL HYPERTENSION: Chronic | ICD-10-CM

## 2018-08-07 DIAGNOSIS — S46.012A STRAIN OF LEFT ROTATOR CUFF CAPSULE, INITIAL ENCOUNTER: ICD-10-CM

## 2018-08-07 DIAGNOSIS — S46.312A STRAIN OF LEFT TRICEPS, INITIAL ENCOUNTER: ICD-10-CM

## 2018-08-07 DIAGNOSIS — E11.319 TYPE 2 DIABETES MELLITUS WITH RETINOPATHY WITHOUT MACULAR EDEMA, WITH LONG-TERM CURRENT USE OF INSULIN, UNSPECIFIED LATERALITY, UNSPECIFIED RETINOPATHY SEVERITY: Primary | Chronic | ICD-10-CM

## 2018-08-07 DIAGNOSIS — M25.512 ACUTE PAIN OF BOTH SHOULDERS: ICD-10-CM

## 2018-08-07 DIAGNOSIS — Z79.4 TYPE 2 DIABETES MELLITUS WITH RETINOPATHY WITHOUT MACULAR EDEMA, WITH LONG-TERM CURRENT USE OF INSULIN, UNSPECIFIED LATERALITY, UNSPECIFIED RETINOPATHY SEVERITY: Primary | Chronic | ICD-10-CM

## 2018-08-07 PROCEDURE — 73030 X-RAY EXAM OF SHOULDER: CPT | Mod: TC,FY,PN,RT

## 2018-08-07 PROCEDURE — 3008F BODY MASS INDEX DOCD: CPT | Mod: CPTII,S$GLB,, | Performed by: FAMILY MEDICINE

## 2018-08-07 PROCEDURE — 99214 OFFICE O/P EST MOD 30 MIN: CPT | Mod: S$GLB,,, | Performed by: FAMILY MEDICINE

## 2018-08-07 PROCEDURE — 73030 X-RAY EXAM OF SHOULDER: CPT | Mod: 26,RT,, | Performed by: RADIOLOGY

## 2018-08-07 PROCEDURE — 3077F SYST BP >= 140 MM HG: CPT | Mod: CPTII,S$GLB,, | Performed by: FAMILY MEDICINE

## 2018-08-07 PROCEDURE — 3078F DIAST BP <80 MM HG: CPT | Mod: CPTII,S$GLB,, | Performed by: FAMILY MEDICINE

## 2018-08-07 PROCEDURE — 73030 X-RAY EXAM OF SHOULDER: CPT | Mod: 26,LT,, | Performed by: RADIOLOGY

## 2018-08-07 PROCEDURE — 99999 PR PBB SHADOW E&M-EST. PATIENT-LVL IV: CPT | Mod: PBBFAC,,, | Performed by: FAMILY MEDICINE

## 2018-08-07 PROCEDURE — 3046F HEMOGLOBIN A1C LEVEL >9.0%: CPT | Mod: CPTII,S$GLB,, | Performed by: FAMILY MEDICINE

## 2018-08-07 PROCEDURE — 73030 X-RAY EXAM OF SHOULDER: CPT | Mod: TC,FY,PN,LT

## 2018-08-07 NOTE — PROGRESS NOTES
Chief Complaint   Patient presents with    Shoulder Pain     BOTH L ARM IS WORSE THAN R       HPI  Michelle Donohue is a 58 y.o. female with multiple medical diagnoses as listed in the medical history and problem list that presents for evaluation for two weeks of shoulder pain, worse in her left arm. Started two weeks ago. She has trouble lifting her left arm over her head. Centered in her tricep and radiates upward to her neck and down to her elbow. She does lift for work and type but not often. She has numbness and tingling in both arms. She has had problems with her right shoulder but the pain was deeper.    PAST MEDICAL HISTORY:  Past Medical History:   Diagnosis Date    Asthma     Diabetes mellitus, type 2     Diabetic neuropathy     Diabetic retinopathy     Epiretinal membrane, left eye     Heart failure     Hyperlipidemia     Hypertension     Retinal detachment     Sickle cell trait     Vitreous hemorrhage of both eyes        PAST SURGICAL HISTORY:  Past Surgical History:   Procedure Laterality Date    Avastin Injection Bilateral     BREAST BIOPSY      age 16    BREAST LUMPECTOMY      right breast    BTL      CATARACT EXTRACTION W/  INTRAOCULAR LENS IMPLANT Left 3/25/2015    OS (Dr. Motnes)    CATARACT EXTRACTION W/  INTRAOCULAR LENS IMPLANT Right 6/17/15    OD ()    PANRETINAL PHOTOCOAGULATION      Both eyes       SOCIAL HISTORY:  Social History     Social History    Marital status: Single     Spouse name: N/A    Number of children: 2    Years of education: N/A     Occupational History          Social History Main Topics    Smoking status: Never Smoker    Smokeless tobacco: Never Used    Alcohol use No    Drug use: No    Sexual activity: Yes     Partners: Male      Comment: . 2 children. works as a .     Other Topics Concern    Not on file     Social History Narrative    No narrative on file       FAMILY HISTORY:  Family History    Problem Relation Age of Onset    Heart failure Father 60            Hyperlipidemia Father 60            Hypertension Father 60            Heart attack Father     Diabetes Mother     Hypertension Mother     Heart failure Mother     Diabetes Brother     Heart failure Brother     Hyperlipidemia Brother     Hypertension Brother     Heart failure Sister        ALLERGIES AND MEDICATIONS: updated and reviewed.  Review of patient's allergies indicates:   Allergen Reactions    Lisinopril Other (See Comments)     cough     Current Outpatient Prescriptions   Medication Sig Dispense Refill    albuterol (PROVENTIL HFA/VENTOLIN HFA) 200 puff inhaler Inhale 2 puffs into the lungs every 6 (six) hours as needed.        amLODIPine (NORVASC) 10 MG tablet Take 1 tablet (10 mg total) by mouth once daily. 30 tablet 5    atorvastatin (LIPITOR) 40 MG tablet Take 1 tablet (40 mg total) by mouth once daily. 30 tablet 5    carvedilol (COREG) 6.25 MG tablet Take 1 tablet (6.25 mg total) by mouth every evening. 30 tablet 5    cloNIDine (CATAPRES) 0.1 MG tablet Take 1 tablet (0.1 mg total) by mouth 2 (two) times daily. 60 tablet 5    exenatide microspheres (BYDUREON) 2 mg/0.65 mL PnIj Inject 2 mg into the skin every 7 days. 4 each 3    ferrous sulfate 325 mg (65 mg iron) Tab tablet Take 1 tablet (325 mg total) by mouth once daily. 30 tablet 0    furosemide (LASIX) 40 MG tablet Take 1 tablet (40 mg total) by mouth 2 (two) times daily. 60 tablet 5    gabapentin (NEURONTIN) 800 MG tablet Take 1 tablet (800 mg total) by mouth 2 (two) times daily. 60 tablet 5    insulin NPH-insulin regular, 70/30, (NOVOLIN 70/30) 100 unit/mL (70-30) injection Inject into the skin 2 (two) times daily. 80 units before breakfast and 70 units before dinner       losartan (COZAAR) 100 MG tablet Take 1 tablet (100 mg total) by mouth once daily. 30 tablet 5    nortriptyline (PAMELOR) 25 MG capsule Take 1 capsule (25 mg  "total) by mouth every evening. 30 capsule 5    potassium chloride SA (K-DUR,KLOR-CON) 20 MEQ tablet Take 1 tablet (20 mEq total) by mouth 2 (two) times daily. 60 tablet 5    traMADol (ULTRAM) 50 mg tablet Take 1 tablet (50 mg total) by mouth every 12 (twelve) hours as needed. 60 tablet 2     Current Facility-Administered Medications   Medication Dose Route Frequency Provider Last Rate Last Dose    albuterol nebulizer solution 2.5 mg  2.5 mg Nebulization 1 time in Clinic/HOD RONY Woo MD           ROS  Review of Systems   Constitutional: Negative for chills, diaphoresis, fatigue, fever and unexpected weight change.   HENT: Negative for rhinorrhea, sinus pressure, sore throat and tinnitus.    Eyes: Negative for photophobia and visual disturbance.   Respiratory: Negative for cough, shortness of breath and wheezing.    Cardiovascular: Negative for chest pain and palpitations.   Gastrointestinal: Negative for abdominal pain, blood in stool, constipation, diarrhea, nausea and vomiting.   Genitourinary: Negative for dysuria, flank pain, frequency and vaginal discharge.   Musculoskeletal: Positive for arthralgias. Negative for joint swelling.   Skin: Negative for rash.   Neurological: Negative for speech difficulty, weakness, light-headedness and headaches.   Psychiatric/Behavioral: Negative for behavioral problems and dysphoric mood.       Physical Exam  Vitals:    08/07/18 1213 08/07/18 1216   BP: (!) 148/72 (!) 142/66   Pulse: 103    Resp: 18    Temp: 98.6 °F (37 °C)    TempSrc: Oral    SpO2: 97%    Weight: 104.8 kg (231 lb)    Height: 5' 3" (1.6 m)     Body mass index is 40.92 kg/m².  Weight: 104.8 kg (231 lb)   Height: 5' 3" (160 cm)     Physical Exam   Constitutional: She is oriented to person, place, and time. She appears well-developed and well-nourished.   HENT:   Head: Normocephalic and atraumatic.   Eyes: EOM are normal.   Musculoskeletal:   Right shoulder with mild AC tenderness, but able to " abduct past 120 degrees without pain, unable to complete apley test    Left shoulder-pain with palpation of triceps muscle, positive apprehension test, unable to complete apley test, able to abduct past 120 degrees with pain   Neurological: She is alert and oriented to person, place, and time.   Skin: Skin is warm and dry. No rash noted. No erythema.   Psychiatric: She has a normal mood and affect. Her behavior is normal.   Nursing note and vitals reviewed.      Health Maintenance       Date Due Completion Date    Pap Smear with HPV Cotest 02/27/1982 ---    Eye Exam 02/10/2017 2/10/2016 (Done)    Override on 2/10/2016: Done    Influenza Vaccine 08/01/2018 10/12/2017 (Done)    Override on 10/12/2017: Done    Override on 10/12/2016: Done    Override on 10/29/2015: Done    Override on 10/1/2014: Done    Foot Exam 09/29/2018 9/29/2017 (Done)    Override on 9/29/2017: Done    Urine Microalbumin 09/29/2018 9/29/2017    Pneumococcal PPSV23 (High Risk) (1) 10/19/2018 (Originally 2/27/1979) ---    Pneumococcal PCV13 (High Risk) (1 - PCV13 Required) 10/16/2020 (Originally 2/27/1962) ---    Hemoglobin A1c 08/17/2018 5/17/2018    Fecal Occult Blood Test (FOBT)/[facility] 09/22/2018 9/22/2017    Override on 9/22/2017: Done    Lipid Panel 09/29/2018 9/29/2017    Mammogram 10/17/2018 10/17/2017    Low Dose Statin 05/16/2019 5/16/2018    TETANUS VACCINE 04/27/2027 4/27/2017 (Declined)    Override on 4/27/2017: Declined            ASSESSMENT     1. 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 Acute pain of both shoulders    3. Strain of left triceps, initial encounter    4. Strain of left rotator cuff capsule, initial encounter        PLAN:     Problem List Items Addressed This Visit        Ophtho    Type 2 diabetes mellitus with retinopathy without macular edema - Primary (Chronic)  -needs to make follow up with  endocrinology      Other Visit Diagnoses     Acute pain of both shoulders      -will get x rays as she likely has underlying arthritis    Relevant Orders    X-Ray Shoulder 2 or More Views Left (Completed)    X-ray Shoulder 2 or More Views Right (Completed)    Strain of left triceps, initial encounter      -apply ice to area, may take NSAIDs over counter, rotator cuff exercises, having some improvement in pain, consult PT if no improvement    Relevant Orders    X-Ray Shoulder 2 or More Views Left (Completed)    X-ray Shoulder 2 or More Views Right (Completed)    Strain of left rotator cuff capsule, initial encounter      -apply ice to area, may take NSAIDs over counter, rotator cuff exercises, having some improvement in pain, consult PT if no improvement    Relevant Orders    X-Ray Shoulder 2 or More Views Left (Completed)        Elevated blood pressure-     -needs blood pressure recheck with nurse               Rosalie Brady MD  08/08/2018 12:26 PM        Follow-up in about 3 months (around 11/7/2018) for Follow up.

## 2018-08-08 ENCOUNTER — PATIENT MESSAGE (OUTPATIENT)
Dept: FAMILY MEDICINE | Facility: CLINIC | Age: 58
End: 2018-08-08

## 2018-08-08 DIAGNOSIS — S43.004A SEPARATED SHOULDER, RIGHT, INITIAL ENCOUNTER: Primary | ICD-10-CM

## 2018-08-10 ENCOUNTER — TELEPHONE (OUTPATIENT)
Dept: FAMILY MEDICINE | Facility: CLINIC | Age: 58
End: 2018-08-10

## 2018-08-13 ENCOUNTER — TELEPHONE (OUTPATIENT)
Dept: FAMILY MEDICINE | Facility: CLINIC | Age: 58
End: 2018-08-13

## 2018-08-13 NOTE — LETTER
August 13, 2018    Michelle JEFF [address]  [place] [id number]             [facility]  [address]  [place] [address]  Phone: [phone]  Fax: [phone] Dear Mrs. Donohue:    Sorry we were unable to contact you to schedule your Orthopedic appointment. Please give the referral department a call at [phone].        If you have any questions or concerns, please don't hesitate to call.    Sincerely,        Kaity Mckeon MA

## 2018-08-24 DIAGNOSIS — E11.9 TYPE 2 DIABETES MELLITUS WITHOUT COMPLICATION: ICD-10-CM

## 2018-09-13 ENCOUNTER — OFFICE VISIT (OUTPATIENT)
Dept: ENDOCRINOLOGY | Facility: CLINIC | Age: 58
End: 2018-09-13
Payer: COMMERCIAL

## 2018-09-13 VITALS
HEIGHT: 63 IN | HEART RATE: 114 BPM | SYSTOLIC BLOOD PRESSURE: 162 MMHG | BODY MASS INDEX: 40.31 KG/M2 | WEIGHT: 227.5 LBS | DIASTOLIC BLOOD PRESSURE: 85 MMHG

## 2018-09-13 DIAGNOSIS — E13.319 RETINOPATHY DUE TO SECONDARY DIABETES: ICD-10-CM

## 2018-09-13 DIAGNOSIS — I10 ESSENTIAL HYPERTENSION: ICD-10-CM

## 2018-09-13 PROCEDURE — 3046F HEMOGLOBIN A1C LEVEL >9.0%: CPT | Mod: CPTII,S$GLB,, | Performed by: NURSE PRACTITIONER

## 2018-09-13 PROCEDURE — 99999 PR PBB SHADOW E&M-EST. PATIENT-LVL IV: CPT | Mod: PBBFAC,,, | Performed by: NURSE PRACTITIONER

## 2018-09-13 PROCEDURE — 3008F BODY MASS INDEX DOCD: CPT | Mod: CPTII,S$GLB,, | Performed by: NURSE PRACTITIONER

## 2018-09-13 PROCEDURE — 3077F SYST BP >= 140 MM HG: CPT | Mod: CPTII,S$GLB,, | Performed by: NURSE PRACTITIONER

## 2018-09-13 PROCEDURE — 99214 OFFICE O/P EST MOD 30 MIN: CPT | Mod: S$GLB,,, | Performed by: NURSE PRACTITIONER

## 2018-09-13 PROCEDURE — 3079F DIAST BP 80-89 MM HG: CPT | Mod: CPTII,S$GLB,, | Performed by: NURSE PRACTITIONER

## 2018-09-13 NOTE — PROGRESS NOTES
CC: This 58 y.o. Black or  female  is here for evaluation of  T2DM along with comorbidities indicated in the Visit Diagnosis section of this encounter.    HPI: Michelle Donohue was diagnosed with T2DM in the late 1980s.       Prior visit on 5/16/18  Found visit with dietician helpful. She has been eating salads daily for lunch. Has lost 3 lb since lov. Stopped donut sticks in the morning. Breakfast is raisin bread. Hs been skipping her dessert.   Feels better in general.   Plan Start bydureon.   Continue same insulin doses.   Restart exercise.   Continue working on diet.   Try testing blood sugar more often ideally 3x/day.   Return to clinic in 6 weeks with written blood sugar.   Labs tomorrow.     Interval history arrives with her daughter.   She has been able to start Bydureon. Tolerating it well without nausea. Does find it cuts her appetite.   She is not taking clonidine BID, only when she gets home from work bc it makes her sleepy.       LAST DIABETES EDUCATION: 3/9/18     DIABETES MEDICATIONS: Novolin 70/30 - 70 units before breakfast and 80 units before supper    Misses medication doses - skips evening dose about 1x every 2 weeks.   Purchases insulin vials from [facility] so monthly cost is > $100.     DM COMPLICATIONS: retinopathy and peripheral neuropathy    SIGNIFICANT DIABETES MED HISTORY:   Metformin dc'd d/t heart failure    SELF MONITORING BLOOD GLUCOSE: uses testing supplies from work. Checks blood glucose at home 1x/day - fasting  Too busy to test BG midday.   103-348, higher when she skips her evening dose. Averages 170s    HYPOGLYCEMIC EPISODES: none recent.      CURRENT DIET: drinks coffee w/ AS and creamer, water, crystal lite. no regular sodas.   Eats 3 meals/day but on the weekends skips lunch.     Lunch was skipped. Got home and ate 1/2 pb&j sandwich.     CURRENT EXERCISE: none recent - stationary bike accidentally discarded.     SOCIAL: ; motivated to be healthy for  "grandparents.       BP (!) 162/85   Pulse (!) 114   Ht 5' 3" (1.6 m)   Wt 103.2 kg (227 lb 8.2 oz)   LMP 11/04/2013   BMI 40.30 kg/m²       ROS:   CONSTITUTIONAL: Appetite good, denies fatigue  GI: No nausea, vomiting, or diarrhea  OTHER: n/a      PHYSICAL EXAM:  GENERAL: Well developed, well nourished. No acute distress.   PSYCH: AAOx3, appropriate mood and affect, conversant, well-groomed. Judgement and insight good.   NEURO: Cranial nerves grossly intact. Speech clear, no tremor.   CHEST: Respirations even and unlabored.         Hemoglobin A1C   Date Value Ref Range Status   05/17/2018 10.6 (H) 4.0 - 5.6 % Final     Comment:     According to ADA guidelines, hemoglobin A1c <7.0% represents  optimal control in non-pregnant diabetic patients. Different  metrics may apply to specific patient populations.   Standards of Medical Care in Diabetes-2016.  For the purpose of screening for the presence of diabetes:  <5.7%     Consistent with the absence of diabetes  5.7-6.4%  Consistent with increasing risk for diabetes   (prediabetes)  >or=6.5%  Consistent with diabetes  Currently, no consensus exists for use of hemoglobin A1c  for diagnosis of diabetes for children.  This Hemoglobin A1c assay has significant interference with fetal   hemoglobin   (HbF). The results are invalid for patients with abnormal amounts of   HbF,   including those with known Hereditary Persistence   of Fetal Hemoglobin. Heterozygous hemoglobin variants (HbAS, HbAC,   HbAD, HbAE, HbA2) do not significantly interfere with this assay;   however, presence of multiple variants in a sample may impact the %   interference.     03/09/2018 12.6 (H) 4.0 - 5.6 % Final     Comment:     According to ADA guidelines, hemoglobin A1c <7.0% represents  optimal control in non-pregnant diabetic patients. Different  metrics may apply to specific patient populations.   Standards of Medical Care in Diabetes-2016.  For the purpose of screening for the presence of " diabetes:  <5.7%     Consistent with the absence of diabetes  5.7-6.4%  Consistent with increasing risk for diabetes   (prediabetes)  >or=6.5%  Consistent with diabetes  Currently, no consensus exists for use of hemoglobin A1c  for diagnosis of diabetes for children.  This Hemoglobin A1c assay has significant interference with fetal   hemoglobin   (HbF). The results are invalid for patients with abnormal amounts of   HbF,   including those with known Hereditary Persistence   of Fetal Hemoglobin. Heterozygous hemoglobin variants (HbAS, HbAC,   HbAD, HbAE, HbA2) do not significantly interfere with this assay;   however, presence of multiple variants in a sample may impact the %   interference.     09/29/2017 11.8 (H) 4.0 - 5.6 % Final     Comment:     According to ADA guidelines, hemoglobin A1c <7.0% represents  optimal control in non-pregnant diabetic patients. Different  metrics may apply to specific patient populations.   Standards of Medical Care in Diabetes-2016.  For the purpose of screening for the presence of diabetes:  <5.7%     Consistent with the absence of diabetes  5.7-6.4%  Consistent with increasing risk for diabetes   (prediabetes)  >or=6.5%  Consistent with diabetes  Currently, no consensus exists for use of hemoglobin A1c  for diagnosis of diabetes for children.  This Hemoglobin A1c assay has significant interference with fetal   hemoglobin   (HbF). The results are invalid for patients with abnormal amounts of   HbF,   including those with known Hereditary Persistence   of Fetal Hemoglobin. Heterozygous hemoglobin variants (HbAS, HbAC,   HbAD, HbAE, HbA2) do not significantly interfere with this assay;   however, presence of multiple variants in a sample may impact the %   interference.     09/29/2017 11.8 (H) 4.0 - 5.6 % Final     Comment:     According to ADA guidelines, hemoglobin A1c <7.0% represents  optimal control in non-pregnant diabetic patients. Different  metrics may apply to specific  patient populations.   Standards of Medical Care in Diabetes-2016.  For the purpose of screening for the presence of diabetes:  <5.7%     Consistent with the absence of diabetes  5.7-6.4%  Consistent with increasing risk for diabetes   (prediabetes)  >or=6.5%  Consistent with diabetes  Currently, no consensus exists for use of hemoglobin A1c  for diagnosis of diabetes for children.  This Hemoglobin A1c assay has significant interference with fetal   hemoglobin   (HbF). The results are invalid for patients with abnormal amounts of   HbF,   including those with known Hereditary Persistence   of Fetal Hemoglobin. Heterozygous hemoglobin variants (HbAS, HbAC,   HbAD, HbAE, HbA2) do not significantly interfere with this assay;   however, presence of multiple variants in a sample may impact the %   interference.             Chemistry        Component Value Date/Time     05/17/2018 0823    K 4.1 05/17/2018 0823     05/17/2018 0823    CO2 18 (L) 05/17/2018 0823    BUN 11 05/17/2018 0823    CREATININE 1.3 05/17/2018 0823     (H) 05/17/2018 0823        Component Value Date/Time    CALCIUM 9.4 05/17/2018 0823    ALKPHOS 130 09/29/2017 0835    AST 17 09/29/2017 0835    ALT 17 09/29/2017 0835    BILITOT 0.3 09/29/2017 0835    ESTGFRAFRICA 52.6 (A) 05/17/2018 0823    EGFRNONAA 45.6 (A) 05/17/2018 0823          Lab Results   Component Value Date    LDLCALC 77.0 09/29/2017        Ref. Range 9/29/2017 08:35   Cholesterol Latest Ref Range: 120 - 199 mg/dL 147   HDL Latest Ref Range: 40 - 75 mg/dL 43   LDL Cholesterol Latest Ref Range: 63.0 - 159.0 mg/dL 77.0   Total Cholesterol/HDL Ratio Latest Ref Range: 2.0 - 5.0  3.4   Triglycerides Latest Ref Range: 30 - 150 mg/dL 135     Lab Results   Component Value Date    MICALBCREAT 43.4 (H) 09/29/2017           STANDARDS of CARE:        ASA:               Last eye exam:       ASSESSMENT and PLAN:    A1C GOAL: < 7.5% if no hypoglycemia     1. Uncontrolled type 2 diabetes  mellitus with complication, with long-term current use of insulin  Would like to place pt on metformin but pt states that she had recurrent heart failure that resolved once her metformin was stopped in 2013.     Will get labs on patient and call w/ rec's once results are in.     Lipid panel    Hemoglobin A1c    Comprehensive metabolic panel    Microalbumin/creatinine urine ratio   2. Retinopathy due to secondary diabetes  Improve glycemic control.      3. Essential hypertension  Comprehensive metabolic panel    Needs to f/u with PCP.            Orders Placed This Encounter   Procedures    Lipid panel     Standing Status:   Future     Standing Expiration Date:   9/13/2019    Hemoglobin A1c     Standing Status:   Future     Standing Expiration Date:   11/12/2019    Comprehensive metabolic panel     Standing Status:   Future     Standing Expiration Date:   11/12/2019    Microalbumin/creatinine urine ratio     Standing Status:   Future     Standing Expiration Date:   11/12/2019     Order Specific Question:   Specimen Source     Answer:   Urine        No Follow-up on file.

## 2018-09-14 ENCOUNTER — LAB VISIT (OUTPATIENT)
Dept: LAB | Facility: HOSPITAL | Age: 58
End: 2018-09-14
Attending: NURSE PRACTITIONER
Payer: COMMERCIAL

## 2018-09-14 DIAGNOSIS — I10 ESSENTIAL HYPERTENSION: ICD-10-CM

## 2018-09-14 LAB
ALBUMIN SERPL BCP-MCNC: 3.6 G/DL
ALP SERPL-CCNC: 95 U/L
ALT SERPL W/O P-5'-P-CCNC: 11 U/L
ANION GAP SERPL CALC-SCNC: 11 MMOL/L
AST SERPL-CCNC: 16 U/L
BILIRUB SERPL-MCNC: 0.4 MG/DL
BUN SERPL-MCNC: 10 MG/DL
CALCIUM SERPL-MCNC: 9.7 MG/DL
CHLORIDE SERPL-SCNC: 106 MMOL/L
CO2 SERPL-SCNC: 25 MMOL/L
CREAT SERPL-MCNC: 1.2 MG/DL
EST. GFR  (AFRICAN AMERICAN): 58 ML/MIN/1.73 M^2
EST. GFR  (NON AFRICAN AMERICAN): 50.3 ML/MIN/1.73 M^2
ESTIMATED AVG GLUCOSE: 237 MG/DL
GLUCOSE SERPL-MCNC: 90 MG/DL
HBA1C MFR BLD HPLC: 9.9 %
POTASSIUM SERPL-SCNC: 3.2 MMOL/L
PROT SERPL-MCNC: 8.2 G/DL
SODIUM SERPL-SCNC: 142 MMOL/L

## 2018-09-14 PROCEDURE — 36415 COLL VENOUS BLD VENIPUNCTURE: CPT | Mod: PO

## 2018-09-14 PROCEDURE — 83036 HEMOGLOBIN GLYCOSYLATED A1C: CPT

## 2018-09-14 PROCEDURE — 80053 COMPREHEN METABOLIC PANEL: CPT

## 2018-09-17 DIAGNOSIS — G62.9 NEUROPATHY: ICD-10-CM

## 2018-09-18 RX ORDER — TRAMADOL HYDROCHLORIDE 50 MG/1
TABLET ORAL
Qty: 28 TABLET | Refills: 0 | Status: SHIPPED | OUTPATIENT
Start: 2018-09-18 | End: 2018-10-23 | Stop reason: SDUPTHER

## 2018-09-18 NOTE — TELEPHONE ENCOUNTER
Please contact patient and inform that a 2 week supply has been sent to her pharmacy while Dr. Brady is out of the office.

## 2018-10-09 DIAGNOSIS — G62.9 NEUROPATHY: ICD-10-CM

## 2018-10-10 ENCOUNTER — OFFICE VISIT (OUTPATIENT)
Dept: PODIATRY | Facility: CLINIC | Age: 58
End: 2018-10-10
Payer: COMMERCIAL

## 2018-10-10 VITALS
WEIGHT: 227.5 LBS | SYSTOLIC BLOOD PRESSURE: 140 MMHG | HEIGHT: 63 IN | BODY MASS INDEX: 40.31 KG/M2 | DIASTOLIC BLOOD PRESSURE: 70 MMHG

## 2018-10-10 DIAGNOSIS — M79.671 RIGHT FOOT PAIN: ICD-10-CM

## 2018-10-10 DIAGNOSIS — R60.9 EDEMA, UNSPECIFIED TYPE: ICD-10-CM

## 2018-10-10 DIAGNOSIS — E11.49 TYPE II DIABETES MELLITUS WITH NEUROLOGICAL MANIFESTATIONS: Primary | ICD-10-CM

## 2018-10-10 DIAGNOSIS — M21.619 BUNION: ICD-10-CM

## 2018-10-10 DIAGNOSIS — M20.40 HAMMER TOE, UNSPECIFIED LATERALITY: ICD-10-CM

## 2018-10-10 PROCEDURE — 3008F BODY MASS INDEX DOCD: CPT | Mod: CPTII,S$GLB,, | Performed by: PODIATRIST

## 2018-10-10 PROCEDURE — 3078F DIAST BP <80 MM HG: CPT | Mod: CPTII,S$GLB,, | Performed by: PODIATRIST

## 2018-10-10 PROCEDURE — 99999 PR PBB SHADOW E&M-EST. PATIENT-LVL III: CPT | Mod: PBBFAC,,, | Performed by: PODIATRIST

## 2018-10-10 PROCEDURE — 99213 OFFICE O/P EST LOW 20 MIN: CPT | Mod: S$GLB,,, | Performed by: PODIATRIST

## 2018-10-10 PROCEDURE — 3077F SYST BP >= 140 MM HG: CPT | Mod: CPTII,S$GLB,, | Performed by: PODIATRIST

## 2018-10-10 PROCEDURE — 3046F HEMOGLOBIN A1C LEVEL >9.0%: CPT | Mod: CPTII,S$GLB,, | Performed by: PODIATRIST

## 2018-10-10 RX ORDER — TRAMADOL HYDROCHLORIDE 50 MG/1
TABLET ORAL
Qty: 28 TABLET | Refills: 0 | OUTPATIENT
Start: 2018-10-10

## 2018-10-10 NOTE — PROGRESS NOTES
Subjective:      Patient ID: Michelle Donohue is a 58 y.o. female.    Chief Complaint: No chief complaint on file.    Michelle is a 58 y.o. female who presents to the clinic for evaluation and treatment of high risk feet. Michelle has a past medical history of Asthma, Diabetes mellitus, type 2, Diabetic neuropathy, Diabetic retinopathy, Epiretinal membrane, left eye, Heart failure, Hyperlipidemia, Hypertension, Retinal detachment, Sickle cell trait, and Vitreous hemorrhage of both eyes.     Reports noticing darkening of right great toe with peeling of skin x one week. Denies trauma to area. Denies pain.     PCP: Rosalie Brady MD    Date Last Seen by PCP:   No chief complaint on file.    Current shoe gear:  Clogs     Hemoglobin A1C   Date Value Ref Range Status   09/14/2018 9.9 (H) 4.0 - 5.6 % Final     Comment:     ADA Screening Guidelines:  5.7-6.4%  Consistent with prediabetes  >or=6.5%  Consistent with diabetes  High levels of fetal hemoglobin interfere with the HbA1C  assay. Heterozygous hemoglobin variants (HbS, HgC, etc)do  not significantly interfere with this assay.   However, presence of multiple variants may affect accuracy.     05/17/2018 10.6 (H) 4.0 - 5.6 % Final     Comment:     According to ADA guidelines, hemoglobin A1c <7.0% represents  optimal control in non-pregnant diabetic patients. Different  metrics may apply to specific patient populations.   Standards of Medical Care in Diabetes-2016.  For the purpose of screening for the presence of diabetes:  <5.7%     Consistent with the absence of diabetes  5.7-6.4%  Consistent with increasing risk for diabetes   (prediabetes)  >or=6.5%  Consistent with diabetes  Currently, no consensus exists for use of hemoglobin A1c  for diagnosis of diabetes for children.  This Hemoglobin A1c assay has significant interference with fetal   hemoglobin   (HbF). The results are invalid for patients with abnormal amounts of   HbF,   including those with known Hereditary  Persistence   of Fetal Hemoglobin. Heterozygous hemoglobin variants (HbAS, HbAC,   HbAD, HbAE, HbA2) do not significantly interfere with this assay;   however, presence of multiple variants in a sample may impact the %   interference.     03/09/2018 12.6 (H) 4.0 - 5.6 % Final     Comment:     According to ADA guidelines, hemoglobin A1c <7.0% represents  optimal control in non-pregnant diabetic patients. Different  metrics may apply to specific patient populations.   Standards of Medical Care in Diabetes-2016.  For the purpose of screening for the presence of diabetes:  <5.7%     Consistent with the absence of diabetes  5.7-6.4%  Consistent with increasing risk for diabetes   (prediabetes)  >or=6.5%  Consistent with diabetes  Currently, no consensus exists for use of hemoglobin A1c  for diagnosis of diabetes for children.  This Hemoglobin A1c assay has significant interference with fetal   hemoglobin   (HbF). The results are invalid for patients with abnormal amounts of   HbF,   including those with known Hereditary Persistence   of Fetal Hemoglobin. Heterozygous hemoglobin variants (HbAS, HbAC,   HbAD, HbAE, HbA2) do not significantly interfere with this assay;   however, presence of multiple variants in a sample may impact the %   interference.       Patient Active Problem List   Diagnosis    Type 2 diabetes mellitus, uncontrolled, with neuropathy    CHF (congestive heart failure)    Glaucoma (increased eye pressure)    Chronic obstructive airway disease with asthma    Type 2 diabetes mellitus with retinopathy without macular edema    Vitreous hemorrhage    Epiretinal membrane    Sickle cell trait    Essential hypertension    Combined hyperlipidemia associated with type 2 diabetes mellitus    Insulin long-term use    Morbid obesity with BMI of 40.0-44.9, adult    Uncontrolled type 2 diabetes mellitus with stage 3 chronic kidney disease    Traction detachment of retina    Diabetes mellitus type 2,  uncontrolled    Hypokalemia     Current Outpatient Medications on File Prior to Visit   Medication Sig Dispense Refill    albuterol (PROVENTIL HFA/VENTOLIN HFA) 200 puff inhaler Inhale 2 puffs into the lungs every 6 (six) hours as needed.        amLODIPine (NORVASC) 10 MG tablet Take 1 tablet (10 mg total) by mouth once daily. 30 tablet 5    atorvastatin (LIPITOR) 40 MG tablet Take 1 tablet (40 mg total) by mouth once daily. 30 tablet 5    BYDUREON 2 mg/0.65 mL PnIj INJECT  2 MG SUBCUTANEOUSLY ONCE A WEEK 4 each 3    carvedilol (COREG) 6.25 MG tablet Take 1 tablet (6.25 mg total) by mouth every evening. 30 tablet 5    cloNIDine (CATAPRES) 0.1 MG tablet Take 1 tablet (0.1 mg total) by mouth 2 (two) times daily. 60 tablet 5    ferrous sulfate 325 mg (65 mg iron) Tab tablet Take 1 tablet (325 mg total) by mouth once daily. 30 tablet 0    furosemide (LASIX) 40 MG tablet Take 1 tablet (40 mg total) by mouth 2 (two) times daily. 60 tablet 5    gabapentin (NEURONTIN) 800 MG tablet Take 1 tablet (800 mg total) by mouth 2 (two) times daily. 60 tablet 5    insulin NPH-insulin regular, 70/30, (NOVOLIN 70/30) 100 unit/mL (70-30) injection Inject into the skin 2 (two) times daily. 80 units before breakfast and 70 units before dinner       losartan (COZAAR) 100 MG tablet Take 1 tablet (100 mg total) by mouth once daily. 30 tablet 5    nortriptyline (PAMELOR) 25 MG capsule Take 1 capsule (25 mg total) by mouth every evening. 30 capsule 5    potassium chloride SA (K-DUR,KLOR-CON) 20 MEQ tablet Take 1 tablet (20 mEq total) by mouth 2 (two) times daily. 60 tablet 5    traMADol (ULTRAM) 50 mg tablet TAKE 1 TABLET BY MOUTH EVERY 12 HOURS AS NEEDED 28 tablet 0     Current Facility-Administered Medications on File Prior to Visit   Medication Dose Route Frequency Provider Last Rate Last Dose    albuterol nebulizer solution 2.5 mg  2.5 mg Nebulization 1 time in Clinic/HOD RONY Woo MD         Allergies    Allergen Reactions    Lisinopril Other (See Comments)     cough     Past Surgical History:   Procedure Laterality Date    Avastin Injection Bilateral     BREAST BIOPSY      age 16    BREAST LUMPECTOMY      right breast    BTL      CATARACT EXTRACTION W/  INTRAOCULAR LENS IMPLANT Left 3/25/2015    OS (Dr. Montes)    CATARACT EXTRACTION W/  INTRAOCULAR LENS IMPLANT Right 6/17/15    OD ()    INSERTION-INTRAOCULAR LENS (IOL) Right 2015    [facility] at [facility] OR [address] [place] [address]    INSERTION-INTRAOCULAR LENS (IOL) Left 3/25/2015    [facility]    PANRETINAL PHOTOCOAGULATION      Both eyes    PHACOEMULSIFICATION-ASPIRATION-CATARACT Right 2015    [facility] at [facility] OR [address] [place] [address]    PHACOEMULSIFICATION-ASPIRATION-CATARACT Left 3/25/2015    [facility]     Family History   Problem Relation Age of Onset    Heart failure Father 60            Hyperlipidemia Father 60            Hypertension Father 60            Heart attack Father     Diabetes Mother     Hypertension Mother     Heart failure Mother     Diabetes Brother     Heart failure Brother     Hyperlipidemia Brother     Hypertension Brother     Heart failure Sister      Social History     Socioeconomic History    Marital status: Single     Spouse name: Not on file    Number of children: 2    Years of education: Not on file    Highest education level: Not on file   Social Needs    Financial resource strain: Not on file    Food insecurity - worry: Not on file    Food insecurity - inability: Not on file    Transportation needs - medical: Not on file    Transportation needs - non-medical: Not on file   Occupational History    Occupation:    Tobacco Use    Smoking status: Never Smoker    Smokeless tobacco: Never Used   Substance and Sexual Activity     "Alcohol use: No     Alcohol/week: 0.0 oz    Drug use: No    Sexual activity: Yes     Partners: Male     Comment: . 2 children. works as a .   Other Topics Concern    Not on file   Social History Narrative    Not on file     Review of Systems   Constitution: Negative for chills, fever and weakness.   Cardiovascular: Positive for leg swelling. Negative for claudication.   Respiratory: Negative for cough and shortness of breath.    Skin: Positive for dry skin and nail changes. Negative for itching and rash.   Musculoskeletal: Negative for falls, joint pain, joint swelling and muscle weakness.   Gastrointestinal: Negative for diarrhea, nausea and vomiting.   Neurological: Positive for numbness and paresthesias. Negative for tremors.   Psychiatric/Behavioral: Negative for altered mental status and hallucinations.           Objective:       Vitals:    10/10/18 0812   BP: (!) 140/70   Weight: 103.2 kg (227 lb 8.2 oz)   Height: 5' 3" (1.6 m)   PainSc: 0-No pain       Physical Exam   Constitutional:   General: Pt. is well-developed, well-nourished, appears stated age, in no acute distress, alert and oriented x 3. No evidence of depression, anxiety, or agitation. Calm, cooperative, and communicative. Appropriate interactions and affect.       Cardiovascular:   Pulses:       Dorsalis pedis pulses are 1+ on the right side, and 1+ on the left side.        Posterior tibial pulses are 1+ on the right side, and 1+ on the left side.   Dorsalis pedis and posterior tibial pulses are diminished Bilaterally. Toes are cool to touch. Feet are warm proximally.There is decreased digital hair.   Musculoskeletal:        Right ankle: She exhibits normal range of motion and no swelling. No tenderness. Achilles tendon exhibits no pain and no defect.        Left ankle: She exhibits normal range of motion and no swelling. No tenderness. Achilles tendon exhibits no pain and no defect.        Right foot: There is normal " range of motion and no tenderness.        Left foot: There is normal range of motion and no tenderness.   Adequate joint range of motion without pain, limitation, nor crepitation Bilateral feet and ankle joints. Muscle strength is 5/5 in all groups bilaterally.    Decreased stride, station of gait.  apropulsive toe off.  Increased angle and base of gait.    Patient has hammertoes of digits 2-5 bilateral partially reducible without symptom today.    Fat pad atrophy to heels and met heads bilateral    MNo ild pain with palpation R hallux lateral nail border.    Neurological: A sensory deficit is present.   [place] 5.07 monofilamant testing is diminished Homero feet. Sharp/dull sensation diminished Bilaterally. Subjective paresthesias with no clearly identifiable source or trigger.    Skin: Skin is warm, dry and intact. No abrasion, no ecchymosis, no lesion and no rash noted. She is not diaphoretic. No cyanosis or erythema. No pallor. Nails show no clubbing.   Dusky appearance to distal tip of right great toe. No ulceration noted. No purulent drainage noted. Flaky skin noted to area.      Interdigital Spaces clean, dry and without evidence of break in skin integrity   Psychiatric: She has a normal mood and affect. Her speech is normal.   Nursing note and vitals reviewed.          10/10/18          Assessment:       Encounter Diagnoses   Name Primary?    Type II diabetes mellitus with neurological manifestations Yes    Right foot pain     Bunion     Hammer toe, unspecified laterality     Edema, unspecified type          Plan:       Diagnoses and all orders for this visit:    Type II diabetes mellitus with neurological manifestations  -     DIABETIC SHOES FOR HOME USE  -     COMPRESSION STOCKINGS    Right foot pain  -     Ankle Brachial Indices WO Stress W Toe Tracings (CUPID ONLY-[facility]); Future    Bunion    Hammer toe, unspecified laterality    Edema,  unspecified type  -     COMPRESSION STOCKINGS        - Shoe inspection. Diabetic Foot Education. Patient reminded of the importance of good nutrition and blood sugar control to help prevent podiatric complications of diabetes. Patient instructed on proper foot hygeine. We discussed wearing proper shoe gear, daily foot inspections, never walking without protective shoe gear, caution putting sharp instruments to feet     - Discussed DM foot care:  Wear comfortable, proper fitting shoes. Wash feet daily. Dry well. After drying, apply moisturizer to feet (no lotion to webspaces). Inspect feet daily for skin breaks, blisters, swelling, or redness. Wear cotton socks (preferably white)  Change socks every day. Do NOT walk barefoot. Do NOT use heating pads or warm/hot water soaks     - LISA with toe tracing ordered.     Rx diabetic shoes with custom molded inserts to be worn at all times while ambulating. Prescription provided with list of local retailers.     Compression stockings ordered      Alana Weinstein DPM

## 2018-10-22 ENCOUNTER — PATIENT MESSAGE (OUTPATIENT)
Dept: FAMILY MEDICINE | Facility: CLINIC | Age: 58
End: 2018-10-22

## 2018-10-22 DIAGNOSIS — G62.9 NEUROPATHY: ICD-10-CM

## 2018-10-23 RX ORDER — TRAMADOL HYDROCHLORIDE 50 MG/1
50 TABLET ORAL EVERY 12 HOURS PRN
Qty: 28 TABLET | Refills: 0 | Status: SHIPPED | OUTPATIENT
Start: 2018-10-23 | End: 2018-11-12 | Stop reason: SDUPTHER

## 2018-10-29 ENCOUNTER — PATIENT OUTREACH (OUTPATIENT)
Dept: ADMINISTRATIVE | Facility: HOSPITAL | Age: 58
End: 2018-10-29

## 2018-10-29 NOTE — PROGRESS NOTES
Left message for pt to give the office a call back in reference to overdue HM, lipid,pap,mammo,eye exam,and FitKit.

## 2018-11-06 ENCOUNTER — TELEPHONE (OUTPATIENT)
Dept: ADMINISTRATIVE | Facility: HOSPITAL | Age: 58
End: 2018-11-06

## 2018-11-12 ENCOUNTER — TELEPHONE (OUTPATIENT)
Dept: FAMILY MEDICINE | Facility: CLINIC | Age: 58
End: 2018-11-12

## 2018-11-12 ENCOUNTER — OFFICE VISIT (OUTPATIENT)
Dept: FAMILY MEDICINE | Facility: CLINIC | Age: 58
End: 2018-11-12
Payer: COMMERCIAL

## 2018-11-12 VITALS
SYSTOLIC BLOOD PRESSURE: 158 MMHG | BODY MASS INDEX: 39.87 KG/M2 | RESPIRATION RATE: 18 BRPM | WEIGHT: 225 LBS | DIASTOLIC BLOOD PRESSURE: 60 MMHG | HEIGHT: 63 IN | OXYGEN SATURATION: 99 % | HEART RATE: 114 BPM | TEMPERATURE: 98 F

## 2018-11-12 DIAGNOSIS — S43.004A DISLOCATION OF RIGHT SHOULDER JOINT, INITIAL ENCOUNTER: ICD-10-CM

## 2018-11-12 DIAGNOSIS — I10 ESSENTIAL HYPERTENSION: Chronic | ICD-10-CM

## 2018-11-12 DIAGNOSIS — Z12.11 ENCOUNTER FOR FIT (FECAL IMMUNOCHEMICAL TEST) SCREENING: ICD-10-CM

## 2018-11-12 DIAGNOSIS — B07.9 VIRAL WARTS, UNSPECIFIED TYPE: ICD-10-CM

## 2018-11-12 DIAGNOSIS — G62.9 NEUROPATHY: ICD-10-CM

## 2018-11-12 PROCEDURE — 3078F DIAST BP <80 MM HG: CPT | Mod: CPTII,S$GLB,, | Performed by: FAMILY MEDICINE

## 2018-11-12 PROCEDURE — 99214 OFFICE O/P EST MOD 30 MIN: CPT | Mod: S$GLB,,, | Performed by: FAMILY MEDICINE

## 2018-11-12 PROCEDURE — 3008F BODY MASS INDEX DOCD: CPT | Mod: CPTII,S$GLB,, | Performed by: FAMILY MEDICINE

## 2018-11-12 PROCEDURE — 99999 PR PBB SHADOW E&M-EST. PATIENT-LVL V: CPT | Mod: PBBFAC,,, | Performed by: FAMILY MEDICINE

## 2018-11-12 PROCEDURE — 3046F HEMOGLOBIN A1C LEVEL >9.0%: CPT | Mod: CPTII,S$GLB,, | Performed by: FAMILY MEDICINE

## 2018-11-12 PROCEDURE — 3077F SYST BP >= 140 MM HG: CPT | Mod: CPTII,S$GLB,, | Performed by: FAMILY MEDICINE

## 2018-11-12 RX ORDER — PODOFILOX 5 MG/ML
SOLUTION TOPICAL 2 TIMES DAILY
Qty: 3.5 ML | Refills: 0 | Status: SHIPPED | OUTPATIENT
Start: 2018-11-12 | End: 2019-01-01

## 2018-11-12 RX ORDER — TRAMADOL HYDROCHLORIDE 50 MG/1
50 TABLET ORAL EVERY 12 HOURS PRN
Qty: 60 TABLET | Refills: 2 | Status: SHIPPED | OUTPATIENT
Start: 2018-11-12 | End: 2019-02-13 | Stop reason: SDUPTHER

## 2018-11-12 RX ORDER — LOSARTAN POTASSIUM 100 MG/1
100 TABLET ORAL DAILY
Qty: 30 TABLET | Refills: 5 | Status: SHIPPED | OUTPATIENT
Start: 2018-11-12 | End: 2019-02-13 | Stop reason: SDUPTHER

## 2018-11-12 RX ORDER — NORTRIPTYLINE HYDROCHLORIDE 25 MG/1
25 CAPSULE ORAL NIGHTLY
Qty: 30 CAPSULE | Refills: 5 | Status: SHIPPED | OUTPATIENT
Start: 2018-11-12 | End: 2019-02-27 | Stop reason: SDUPTHER

## 2018-11-12 RX ORDER — GABAPENTIN 800 MG/1
800 TABLET ORAL 2 TIMES DAILY
Qty: 60 TABLET | Refills: 5 | Status: SHIPPED | OUTPATIENT
Start: 2018-11-12 | End: 2019-02-13 | Stop reason: SDUPTHER

## 2018-11-12 NOTE — TELEPHONE ENCOUNTER
Tiera @ [facility] contacted and states that diagnostic codes have been located and that the previous message can be disregarded.

## 2018-11-12 NOTE — TELEPHONE ENCOUNTER
----- Message from Sara Guerrero sent at 11/12/2018  1:05 PM CST -----  Contact: [facility]  [phone]  Received a script for Diagnostic code for Tramadol. Pls call [facility] [phone]. Thanks......Ofe

## 2018-11-13 NOTE — PROGRESS NOTES
Chief Complaint   Patient presents with    Diabetes    sore on finger     right hand pointer finger    Shoulder Pain     both       HPI  Michelle Donohue is a 58 y.o. female with multiple medical diagnoses as listed in the medical history and problem list that presents for follow-up for diabetes, a wart on her right hand, and bilateral shoulder pain.    DM- she has been seeing endocrine and making efforts to lower her sugar by watching her diet. Her A1C is 9.9, down from 10.6.     Wart- has been present on the pointer finger of her right hand for some time.s he has tried OTC topical therapy without relief, it is tender, no signs of drainage    Shoulder pain- has seen orthopedics for this and had an injection that did not give much relief    We discussed her elevated heart rate, she has eaten more than four hours ago    PAST MEDICAL HISTORY:  Past Medical History:   Diagnosis Date    Asthma     Diabetes mellitus, type 2     Diabetic neuropathy     Diabetic retinopathy     Epiretinal membrane, left eye     Heart failure     Hyperlipidemia     Hypertension     Retinal detachment     Sickle cell trait     Vitreous hemorrhage of both eyes        PAST SURGICAL HISTORY:  Past Surgical History:   Procedure Laterality Date    Avastin Injection Bilateral     BREAST BIOPSY      age 16    BREAST LUMPECTOMY      right breast    BTL      CATARACT EXTRACTION W/  INTRAOCULAR LENS IMPLANT Left 3/25/2015    OS (Dr. Montes)    CATARACT EXTRACTION W/  INTRAOCULAR LENS IMPLANT Right 6/17/15    OD ()    INSERTION-INTRAOCULAR LENS (IOL) Right 6/17/2015    [facility]    INSERTION-INTRAOCULAR LENS (IOL) Left 3/25/2015    [facility]    PANRETINAL PHOTOCOAGULATION      Both eyes    PHACOEMULSIFICATION-ASPIRATION-CATARACT Right 6/17/2015    [facility]     PHACOEMULSIFICATION-ASPIRATION-CATARACT Left 3/25/2015    Performed by [facility] at [facility] OR [address] [place] [address]       SOCIAL HISTORY:  Social History     Socioeconomic History    Marital status: Single     Spouse name: Not on file    Number of children: 2    Years of education: Not on file    Highest education level: Not on file   Social Needs    Financial resource strain: Not on file    Food insecurity - worry: Not on file    Food insecurity - inability: Not on file    Transportation needs - medical: Not on file    Transportation needs - non-medical: Not on file   Occupational History    Occupation:    Tobacco Use    Smoking status: Never Smoker    Smokeless tobacco: Never Used   Substance and Sexual Activity    Alcohol use: No     Alcohol/week: 0.0 oz    Drug use: No    Sexual activity: Yes     Partners: Male     Comment: . 2 children. works as a .   Other Topics Concern    Not on file   Social History Narrative    Not on file       FAMILY HISTORY:  Family History   Problem Relation Age of Onset    Heart failure Father 60            Hyperlipidemia Father 60            Hypertension Father 60            Heart attack Father     Diabetes Mother     Hypertension Mother     Heart failure Mother     Diabetes Brother     Heart failure Brother     Hyperlipidemia Brother     Hypertension Brother     Heart failure Sister        ALLERGIES AND MEDICATIONS: updated and reviewed.  Review of patient's allergies indicates:   Allergen Reactions    Lisinopril Other (See Comments)     cough     Current Outpatient Medications   Medication Sig Dispense Refill    albuterol (PROVENTIL HFA/VENTOLIN HFA) 200 puff inhaler Inhale 2 puffs into the lungs every 6 (six) hours as needed.        amLODIPine (NORVASC) 10 MG tablet Take 1 tablet (10 mg total) by mouth once daily. 30 tablet 5    atorvastatin (LIPITOR) 40 MG tablet Take 1 tablet (40 mg total)  by mouth once daily. 30 tablet 5    BYDUREON 2 mg/0.65 mL PnIj INJECT  2 MG SUBCUTANEOUSLY ONCE A WEEK 4 each 3    carvedilol (COREG) 6.25 MG tablet Take 1 tablet (6.25 mg total) by mouth every evening. 30 tablet 5    cloNIDine (CATAPRES) 0.1 MG tablet Take 1 tablet (0.1 mg total) by mouth 2 (two) times daily. 60 tablet 5    ferrous sulfate 325 mg (65 mg iron) Tab tablet Take 1 tablet (325 mg total) by mouth once daily. 30 tablet 0    furosemide (LASIX) 40 MG tablet Take 1 tablet (40 mg total) by mouth 2 (two) times daily. 60 tablet 5    gabapentin (NEURONTIN) 800 MG tablet Take 1 tablet (800 mg total) by mouth 2 (two) times daily. 60 tablet 5    insulin NPH-insulin regular, 70/30, (NOVOLIN 70/30) 100 unit/mL (70-30) injection Inject into the skin 2 (two) times daily. 80 units before breakfast and 70 units before dinner       losartan (COZAAR) 100 MG tablet Take 1 tablet (100 mg total) by mouth once daily. 30 tablet 5    nortriptyline (PAMELOR) 25 MG capsule Take 1 capsule (25 mg total) by mouth every evening. 30 capsule 5    potassium chloride SA (K-DUR,KLOR-CON) 20 MEQ tablet Take 1 tablet (20 mEq total) by mouth 2 (two) times daily. 60 tablet 5    traMADol (ULTRAM) 50 mg tablet Take 1 tablet (50 mg total) by mouth every 12 (twelve) hours as needed. 60 tablet 2    podofilox (CONDYLOX) 0.5 % external solution Apply topically 2 (two) times daily. 3.5 mL 0     Current Facility-Administered Medications   Medication Dose Route Frequency Provider Last Rate Last Dose    albuterol nebulizer solution 2.5 mg  2.5 mg Nebulization 1 time in Clinic/HOD RONY Woo MD           ROS  Review of Systems   Constitutional: Negative for chills, diaphoresis, fatigue, fever and unexpected weight change.   HENT: Negative for rhinorrhea, sinus pressure, sore throat and tinnitus.    Eyes: Negative for photophobia and visual disturbance.   Respiratory: Negative for cough, shortness of breath and wheezing.   "  Cardiovascular: Negative for chest pain and palpitations.   Gastrointestinal: Negative for abdominal pain, blood in stool, constipation, diarrhea, nausea and vomiting.   Genitourinary: Negative for dysuria, flank pain, frequency and vaginal discharge.   Musculoskeletal: Positive for arthralgias. Negative for joint swelling.   Skin: Negative for rash.   Neurological: Negative for speech difficulty, weakness, light-headedness and headaches.   Psychiatric/Behavioral: Negative for behavioral problems and dysphoric mood.       Physical Exam  Vitals:    11/12/18 1141 11/12/18 1152   BP: (!) 158/60 (!) 158/60   Pulse: (!) 116 (!) 114   Resp: 18    Temp: 98 °F (36.7 °C)    TempSrc: Oral    SpO2: 99%    Weight: 102.1 kg (225 lb)    Height: 5' 3" (1.6 m)     Body mass index is 39.86 kg/m².  Weight: 102.1 kg (225 lb)   Height: 5' 3" (160 cm)     Physical Exam   Constitutional: She is oriented to person, place, and time. She appears well-developed and well-nourished. No distress.   HENT:   Head: Normocephalic and atraumatic.   Eyes: EOM are normal.   Neck: Neck supple.   Cardiovascular: Normal rate and regular rhythm. Exam reveals no gallop and no friction rub.   No murmur heard.  Pulmonary/Chest: Effort normal and breath sounds normal. No respiratory distress. She has no wheezes. She has no rales.   Lymphadenopathy:     She has no cervical adenopathy.   Neurological: She is alert and oriented to person, place, and time.   Skin: Skin is warm and dry. No rash noted. No erythema.   Right pointer finger with raised wart present at PIP joint   Psychiatric: She has a normal mood and affect. Her behavior is normal.   Nursing note and vitals reviewed.      Health Maintenance       Date Due Completion Date    Pneumococcal PPSV23 (High Risk) (1) 02/27/1979 ---    Pap Smear with HPV Cotest 02/27/1982 ---    Fecal Occult Blood Test (FOBT)/FitKit 09/22/2018 9/22/2017    Override on 9/22/2017: Done    Foot Exam 09/29/2018 9/29/2017 (Done) "    Override on 9/29/2017: Done    Lipid Panel 09/29/2018 9/29/2017    Pneumococcal PCV13 (High Risk) (1 - PCV13 Required) 10/16/2020 (Originally 2/27/1962) ---    Hemoglobin A1c 12/14/2018 9/14/2018    Urine Microalbumin 09/14/2019 9/14/2018    Eye Exam 10/02/2019 10/2/2018    Override on 2/10/2016: Done    Mammogram 10/17/2019 10/17/2017    Low Dose Statin 11/12/2019 11/12/2018    TETANUS VACCINE 04/27/2027 4/27/2017 (Declined)    Override on 4/27/2017: Declined            ASSESSMENT     1. Type 2 diabetes mellitus, uncontrolled, with neuropathy    2. Neuropathy    3. Essential hypertension    4. Dislocation of right shoulder joint, initial encounter    5. Viral warts, unspecified type    6. Encounter for FIT (fecal immunochemical test) screening        PLAN:     Problem List Items Addressed This Visit        Cardiac/Vascular    Essential hypertension (Chronic)  -nurse BP check in two weeks    Relevant Medications    losartan (COZAAR) 100 MG tablet       Endocrine    Type 2 diabetes mellitus, uncontrolled, with neuropathy - Primary (Chronic)  -she takes ultram for DM neuropathy     Relevant Medications    traMADol (ULTRAM) 50 mg tablet    nortriptyline (PAMELOR) 25 MG capsule    gabapentin (NEURONTIN) 800 MG tablet      Other Visit Diagnoses     Neuropathy        Relevant Medications    traMADol (ULTRAM) 50 mg tablet    Dislocation of right shoulder joint, initial encounter      -recommend she follow up with orthopedics for evaluation and continued management    Viral warts, unspecified type      -consult derm for removal    Relevant Medications    podofilox (CONDYLOX) 0.5 % external solution    Other Relevant Orders    Ambulatory consult to Dermatology    Encounter for FIT (fecal immunochemical test) screening        Relevant Orders    Fecal Immunochemical Test (iFOBT)            Rosalie Brady MD  11/13/2018 2:03 PM        Follow-up in about 3 months (around 2/12/2019) for Follow up.

## 2018-11-14 DIAGNOSIS — I10 ESSENTIAL HYPERTENSION: Chronic | ICD-10-CM

## 2018-11-14 RX ORDER — AMLODIPINE BESYLATE 10 MG/1
TABLET ORAL
Qty: 30 TABLET | Refills: 5 | Status: SHIPPED | OUTPATIENT
Start: 2018-11-14 | End: 2019-02-13 | Stop reason: SDUPTHER

## 2018-11-15 ENCOUNTER — TELEPHONE (OUTPATIENT)
Dept: FAMILY MEDICINE | Facility: CLINIC | Age: 58
End: 2018-11-15

## 2018-11-15 NOTE — TELEPHONE ENCOUNTER
Spoke with pt regarding her Dermatology referral and pt will call to schedule her appt with Dr.Dana Mason.

## 2018-12-04 ENCOUNTER — LAB VISIT (OUTPATIENT)
Dept: LAB | Facility: HOSPITAL | Age: 58
End: 2018-12-04
Attending: FAMILY MEDICINE
Payer: COMMERCIAL

## 2018-12-04 DIAGNOSIS — Z12.11 ENCOUNTER FOR FIT (FECAL IMMUNOCHEMICAL TEST) SCREENING: ICD-10-CM

## 2018-12-04 LAB — HEMOCCULT STL QL IA: NEGATIVE

## 2018-12-04 PROCEDURE — 82274 ASSAY TEST FOR BLOOD FECAL: CPT

## 2019-01-01 ENCOUNTER — PATIENT MESSAGE (OUTPATIENT)
Dept: FAMILY MEDICINE | Facility: CLINIC | Age: 59
End: 2019-01-01

## 2019-01-01 ENCOUNTER — PATIENT OUTREACH (OUTPATIENT)
Dept: ADMINISTRATIVE | Facility: OTHER | Age: 59
End: 2019-01-01

## 2019-01-01 ENCOUNTER — LAB VISIT (OUTPATIENT)
Dept: LAB | Facility: HOSPITAL | Age: 59
End: 2019-01-01
Attending: FAMILY MEDICINE
Payer: COMMERCIAL

## 2019-01-01 ENCOUNTER — OFFICE VISIT (OUTPATIENT)
Dept: ENDOCRINOLOGY | Facility: CLINIC | Age: 59
End: 2019-01-01
Payer: COMMERCIAL

## 2019-01-01 ENCOUNTER — OFFICE VISIT (OUTPATIENT)
Dept: FAMILY MEDICINE | Facility: CLINIC | Age: 59
End: 2019-01-01
Payer: COMMERCIAL

## 2019-01-01 ENCOUNTER — PATIENT OUTREACH (OUTPATIENT)
Dept: ADMINISTRATIVE | Facility: HOSPITAL | Age: 59
End: 2019-01-01

## 2019-01-01 ENCOUNTER — TELEPHONE (OUTPATIENT)
Dept: FAMILY MEDICINE | Facility: CLINIC | Age: 59
End: 2019-01-01

## 2019-01-01 ENCOUNTER — TELEPHONE (OUTPATIENT)
Dept: ENDOCRINOLOGY | Facility: CLINIC | Age: 59
End: 2019-01-01

## 2019-01-01 ENCOUNTER — LAB VISIT (OUTPATIENT)
Dept: LAB | Facility: HOSPITAL | Age: 59
End: 2019-01-01
Attending: NURSE PRACTITIONER
Payer: COMMERCIAL

## 2019-01-01 ENCOUNTER — OFFICE VISIT (OUTPATIENT)
Dept: PODIATRY | Facility: CLINIC | Age: 59
End: 2019-01-01
Payer: COMMERCIAL

## 2019-01-01 VITALS
BODY MASS INDEX: 38.67 KG/M2 | DIASTOLIC BLOOD PRESSURE: 80 MMHG | HEART RATE: 98 BPM | WEIGHT: 218.31 LBS | SYSTOLIC BLOOD PRESSURE: 135 MMHG

## 2019-01-01 VITALS
WEIGHT: 223.13 LBS | OXYGEN SATURATION: 96 % | DIASTOLIC BLOOD PRESSURE: 72 MMHG | BODY MASS INDEX: 39.54 KG/M2 | HEART RATE: 112 BPM | HEIGHT: 63 IN | TEMPERATURE: 98 F | SYSTOLIC BLOOD PRESSURE: 130 MMHG

## 2019-01-01 VITALS
SYSTOLIC BLOOD PRESSURE: 130 MMHG | HEIGHT: 63 IN | DIASTOLIC BLOOD PRESSURE: 76 MMHG | WEIGHT: 223.13 LBS | BODY MASS INDEX: 39.54 KG/M2

## 2019-01-01 DIAGNOSIS — G62.9 NEUROPATHY: ICD-10-CM

## 2019-01-01 DIAGNOSIS — M20.42 HAMMER TOES OF BOTH FEET: ICD-10-CM

## 2019-01-01 DIAGNOSIS — M20.5X2 HALLUX LIMITUS, ACQUIRED, LEFT: ICD-10-CM

## 2019-01-01 DIAGNOSIS — M20.41 HAMMER TOES OF BOTH FEET: ICD-10-CM

## 2019-01-01 DIAGNOSIS — E13.319 RETINOPATHY DUE TO SECONDARY DIABETES: ICD-10-CM

## 2019-01-01 DIAGNOSIS — I50.9 CONGESTIVE HEART FAILURE, UNSPECIFIED HF CHRONICITY, UNSPECIFIED HEART FAILURE TYPE: ICD-10-CM

## 2019-01-01 DIAGNOSIS — E87.6 HYPOKALEMIA: ICD-10-CM

## 2019-01-01 DIAGNOSIS — E11.49 TYPE II DIABETES MELLITUS WITH NEUROLOGICAL MANIFESTATIONS: Primary | ICD-10-CM

## 2019-01-01 DIAGNOSIS — E11.9 TYPE 2 DIABETES MELLITUS WITHOUT COMPLICATION: ICD-10-CM

## 2019-01-01 DIAGNOSIS — I10 ESSENTIAL HYPERTENSION: ICD-10-CM

## 2019-01-01 DIAGNOSIS — R09.89 DECREASED PEDAL PULSES: ICD-10-CM

## 2019-01-01 DIAGNOSIS — I10 ESSENTIAL HYPERTENSION: Chronic | ICD-10-CM

## 2019-01-01 DIAGNOSIS — Z12.39 BREAST CANCER SCREENING: ICD-10-CM

## 2019-01-01 DIAGNOSIS — M72.2 PLANTAR FASCIAL FIBROMATOSIS OF RIGHT FOOT: ICD-10-CM

## 2019-01-01 DIAGNOSIS — M20.5X1 HALLUX LIMITUS, ACQUIRED, RIGHT: ICD-10-CM

## 2019-01-01 DIAGNOSIS — Z79.4 INSULIN LONG-TERM USE: Chronic | ICD-10-CM

## 2019-01-01 DIAGNOSIS — M21.6X9 ACQUIRED CAVUS FOOT DEFORMITY: ICD-10-CM

## 2019-01-01 DIAGNOSIS — E11.65 UNCONTROLLED TYPE 2 DIABETES MELLITUS WITH HYPERGLYCEMIA: Primary | Chronic | ICD-10-CM

## 2019-01-01 LAB
ANION GAP SERPL CALC-SCNC: 9 MMOL/L (ref 8–16)
BUN SERPL-MCNC: 13 MG/DL (ref 6–20)
CALCIUM SERPL-MCNC: 10.1 MG/DL (ref 8.7–10.5)
CHLORIDE SERPL-SCNC: 104 MMOL/L (ref 95–110)
CHOLEST SERPL-MCNC: 145 MG/DL (ref 120–199)
CHOLEST/HDLC SERPL: 3.8 {RATIO} (ref 2–5)
CO2 SERPL-SCNC: 29 MMOL/L (ref 23–29)
CREAT SERPL-MCNC: 1.4 MG/DL (ref 0.5–1.4)
EST. GFR  (AFRICAN AMERICAN): 47.8 ML/MIN/1.73 M^2
EST. GFR  (NON AFRICAN AMERICAN): 41.4 ML/MIN/1.73 M^2
ESTIMATED AVG GLUCOSE: 189 MG/DL (ref 68–131)
ESTIMATED AVG GLUCOSE: 223 MG/DL (ref 68–131)
ESTIMATED AVG GLUCOSE: 223 MG/DL (ref 68–131)
GLUCOSE SERPL-MCNC: 265 MG/DL (ref 70–110)
HBA1C MFR BLD HPLC: 8.2 % (ref 4–5.6)
HBA1C MFR BLD HPLC: 9.4 % (ref 4–5.6)
HBA1C MFR BLD HPLC: 9.4 % (ref 4–5.6)
HDLC SERPL-MCNC: 38 MG/DL (ref 40–75)
HDLC SERPL: 26.2 % (ref 20–50)
LDLC SERPL CALC-MCNC: 78.6 MG/DL (ref 63–159)
LEFT EYE DM RETINOPATHY: POSITIVE
NONHDLC SERPL-MCNC: 107 MG/DL
POTASSIUM SERPL-SCNC: 3.9 MMOL/L (ref 3.5–5.1)
RIGHT EYE DM RETINOPATHY: POSITIVE
SODIUM SERPL-SCNC: 142 MMOL/L (ref 136–145)
TRIGL SERPL-MCNC: 142 MG/DL (ref 30–150)

## 2019-01-01 PROCEDURE — 99214 PR OFFICE/OUTPT VISIT, EST, LEVL IV, 30-39 MIN: ICD-10-PCS | Mod: S$GLB,,, | Performed by: FAMILY MEDICINE

## 2019-01-01 PROCEDURE — 99214 PR OFFICE/OUTPT VISIT, EST, LEVL IV, 30-39 MIN: ICD-10-PCS | Mod: S$GLB,,, | Performed by: NURSE PRACTITIONER

## 2019-01-01 PROCEDURE — 83036 HEMOGLOBIN GLYCOSYLATED A1C: CPT

## 2019-01-01 PROCEDURE — 99214 PR OFFICE/OUTPT VISIT, EST, LEVL IV, 30-39 MIN: ICD-10-PCS | Mod: S$GLB,,, | Performed by: PODIATRIST

## 2019-01-01 PROCEDURE — 80048 BASIC METABOLIC PNL TOTAL CA: CPT

## 2019-01-01 PROCEDURE — 36415 COLL VENOUS BLD VENIPUNCTURE: CPT | Mod: PO

## 2019-01-01 PROCEDURE — 99999 PR PBB SHADOW E&M-EST. PATIENT-LVL IV: ICD-10-PCS | Mod: PBBFAC,,, | Performed by: NURSE PRACTITIONER

## 2019-01-01 PROCEDURE — 99214 OFFICE O/P EST MOD 30 MIN: CPT | Mod: S$GLB,,, | Performed by: PODIATRIST

## 2019-01-01 PROCEDURE — 99999 PR PBB SHADOW E&M-EST. PATIENT-LVL III: CPT | Mod: PBBFAC,,, | Performed by: PODIATRIST

## 2019-01-01 PROCEDURE — 99999 PR PBB SHADOW E&M-EST. PATIENT-LVL III: ICD-10-PCS | Mod: PBBFAC,,, | Performed by: PODIATRIST

## 2019-01-01 PROCEDURE — 99999 PR PBB SHADOW E&M-EST. PATIENT-LVL IV: CPT | Mod: PBBFAC,,, | Performed by: NURSE PRACTITIONER

## 2019-01-01 PROCEDURE — 99214 OFFICE O/P EST MOD 30 MIN: CPT | Mod: S$GLB,,, | Performed by: FAMILY MEDICINE

## 2019-01-01 PROCEDURE — 99999 PR PBB SHADOW E&M-EST. PATIENT-LVL IV: CPT | Mod: PBBFAC,,, | Performed by: FAMILY MEDICINE

## 2019-01-01 PROCEDURE — 99214 OFFICE O/P EST MOD 30 MIN: CPT | Mod: S$GLB,,, | Performed by: NURSE PRACTITIONER

## 2019-01-01 PROCEDURE — 80061 LIPID PANEL: CPT

## 2019-01-01 PROCEDURE — 99999 PR PBB SHADOW E&M-EST. PATIENT-LVL IV: ICD-10-PCS | Mod: PBBFAC,,, | Performed by: FAMILY MEDICINE

## 2019-01-01 RX ORDER — TRAMADOL HYDROCHLORIDE 50 MG/1
50 TABLET ORAL EVERY 12 HOURS PRN
Qty: 60 TABLET | Refills: 2 | Status: SHIPPED | OUTPATIENT
Start: 2019-01-01 | End: 2020-01-01 | Stop reason: SDUPTHER

## 2019-01-01 RX ORDER — SEMAGLUTIDE 1.34 MG/ML
INJECTION, SOLUTION SUBCUTANEOUS
Qty: 2 SYRINGE | Refills: 1 | Status: SHIPPED | OUTPATIENT
Start: 2019-01-01 | End: 2020-01-01

## 2019-01-01 RX ORDER — TRAMADOL HYDROCHLORIDE 50 MG/1
TABLET ORAL
Refills: 0 | OUTPATIENT
Start: 2019-01-01

## 2019-01-01 RX ORDER — AMLODIPINE BESYLATE 10 MG/1
10 TABLET ORAL DAILY
Qty: 30 TABLET | Refills: 5 | Status: SHIPPED | OUTPATIENT
Start: 2019-01-01

## 2019-01-01 RX ORDER — SEMAGLUTIDE 1.34 MG/ML
INJECTION, SOLUTION SUBCUTANEOUS
Qty: 2 SYRINGE | Refills: 0 | Status: SHIPPED | OUTPATIENT
Start: 2019-01-01 | End: 2019-01-01 | Stop reason: SDUPTHER

## 2019-01-01 RX ORDER — CLONIDINE HYDROCHLORIDE 0.1 MG/1
TABLET ORAL
Qty: 60 TABLET | Refills: 5 | Status: SHIPPED | OUTPATIENT
Start: 2019-01-01

## 2019-01-01 RX ORDER — AMLODIPINE BESYLATE 10 MG/1
TABLET ORAL
Qty: 90 TABLET | Refills: 1 | Status: SHIPPED | OUTPATIENT
Start: 2019-01-01 | End: 2019-01-01 | Stop reason: SDUPTHER

## 2019-01-01 RX ORDER — LOSARTAN POTASSIUM 100 MG/1
100 TABLET ORAL DAILY
Qty: 30 TABLET | Refills: 5 | Status: SHIPPED | OUTPATIENT
Start: 2019-01-01 | End: 2020-01-01

## 2019-01-01 RX ORDER — CARVEDILOL 6.25 MG/1
6.25 TABLET ORAL NIGHTLY
Qty: 30 TABLET | Refills: 5 | Status: SHIPPED | OUTPATIENT
Start: 2019-01-01 | End: 2020-01-01

## 2019-01-01 RX ORDER — POTASSIUM CHLORIDE 20 MEQ/1
TABLET, EXTENDED RELEASE ORAL
Qty: 180 TABLET | Refills: 3 | Status: SHIPPED | OUTPATIENT
Start: 2019-01-01 | End: 2020-01-01

## 2019-01-01 RX ORDER — NORTRIPTYLINE HYDROCHLORIDE 25 MG/1
25 CAPSULE ORAL NIGHTLY
Qty: 30 CAPSULE | Refills: 5 | Status: SHIPPED | OUTPATIENT
Start: 2019-01-01 | End: 2020-01-01

## 2019-01-01 RX ORDER — TRAMADOL HYDROCHLORIDE 50 MG/1
TABLET ORAL
Qty: 60 TABLET | Refills: 2 | OUTPATIENT
Start: 2019-01-01

## 2019-01-29 ENCOUNTER — PATIENT MESSAGE (OUTPATIENT)
Dept: ADMINISTRATIVE | Facility: HOSPITAL | Age: 59
End: 2019-01-29

## 2019-01-30 ENCOUNTER — PATIENT OUTREACH (OUTPATIENT)
Dept: ADMINISTRATIVE | Facility: HOSPITAL | Age: 59
End: 2019-01-30

## 2019-02-01 ENCOUNTER — OFFICE VISIT (OUTPATIENT)
Dept: ENDOCRINOLOGY | Facility: CLINIC | Age: 59
End: 2019-02-01
Payer: COMMERCIAL

## 2019-02-01 VITALS
HEIGHT: 63 IN | WEIGHT: 226.19 LBS | SYSTOLIC BLOOD PRESSURE: 140 MMHG | DIASTOLIC BLOOD PRESSURE: 81 MMHG | HEART RATE: 109 BPM | BODY MASS INDEX: 40.08 KG/M2

## 2019-02-01 DIAGNOSIS — E13.319 RETINOPATHY DUE TO SECONDARY DIABETES: ICD-10-CM

## 2019-02-01 DIAGNOSIS — E66.01 MORBID OBESITY, UNSPECIFIED OBESITY TYPE: ICD-10-CM

## 2019-02-01 DIAGNOSIS — I10 ESSENTIAL HYPERTENSION: ICD-10-CM

## 2019-02-01 DIAGNOSIS — I50.9 CONGESTIVE HEART FAILURE, UNSPECIFIED HF CHRONICITY, UNSPECIFIED HEART FAILURE TYPE: ICD-10-CM

## 2019-02-01 PROCEDURE — 99214 PR OFFICE/OUTPT VISIT, EST, LEVL IV, 30-39 MIN: ICD-10-PCS | Mod: S$GLB,,, | Performed by: NURSE PRACTITIONER

## 2019-02-01 PROCEDURE — 99214 OFFICE O/P EST MOD 30 MIN: CPT | Mod: S$GLB,,, | Performed by: NURSE PRACTITIONER

## 2019-02-01 PROCEDURE — 99999 PR PBB SHADOW E&M-EST. PATIENT-LVL IV: ICD-10-PCS | Mod: PBBFAC,,, | Performed by: NURSE PRACTITIONER

## 2019-02-01 PROCEDURE — 99999 PR PBB SHADOW E&M-EST. PATIENT-LVL IV: CPT | Mod: PBBFAC,,, | Performed by: NURSE PRACTITIONER

## 2019-02-01 NOTE — PATIENT INSTRUCTIONS
a1c today. Will call you with results.     Continue Bydureon.     Continue Novolin 70/30 - take 70 units before breakfast and 50 units before dinner.     Start Invokana 100 mg once daily in the morning.   Drink at least 2 liters of water to avoid dehydration. Side effects include yeast infection, UTI, and lightheadedness. May lower blood pressure a few points. May lose some weight. Stop 2 days  prior to surgery and resume 2 days later. Go to ER if nausea and vomiting. Cannot go on no carbohydrate diet/keto diet. We expect there to be positive glucose in your urine samples now.     Call with any issues.     Return to clinic in 5 months with labs prior. (a1c, lipid panel).     Follow up with cardiologist.

## 2019-02-01 NOTE — PROGRESS NOTES
CC: This 58 y.o. Black or  female  is here for evaluation of  T2DM along with comorbidities indicated in the Visit Diagnosis section of this encounter.    HPI: Michelle Donohue was diagnosed with T2DM in the late 1980s.       Prior visit on 5/16/18  Found visit with dietician helpful. She has been eating salads daily for lunch. Has lost 3 lb since lov. Stopped donut sticks in the morning. Breakfast is raisin bread. Hs been skipping her dessert.   Feels better in general.   Plan Start bydureon.   Continue same insulin doses.   Restart exercise.   Continue working on diet.   Try testing blood sugar more often ideally 3x/day.   Return to clinic in 6 weeks with written blood sugar.   Labs tomorrow.     Prior visit on 9/13/18 arrives with her daughter.   She has been able to start Bydureon. Tolerating it well without nausea. Does find it cuts her appetite.   She is not taking clonidine BID, only when she gets home from work bc it makes her sleepy.   Plan Would like to place pt on metformin but pt states that she had recurrent heart failure that resolved once her metformin was stopped in 2013.   Will get labs on patient and call w/ rec's once results are in.       Interval history  No recent a1c available.   She is now taking 50 units before supper instead of 80 units.       LAST DIABETES EDUCATION: 3/9/18     DIABETES MEDICATIONS: Bydureon 2 mg once weekly, Novolin 70/30 - 70 units before breakfast and 50 units before supper    Misses medication doses - skips evening dose about 1x every 2 weeks.   Purchases insulin vials from [employer] so monthly cost is > $100.      DM COMPLICATIONS: retinopathy and peripheral neuropathy    SIGNIFICANT DIABETES MED HISTORY:   Metformin dc'd d/t heart failure - pt states that she had recurrent heart failure that resolved once her metformin was stopped in 2013.       SELF MONITORING BLOOD GLUCOSE: uses testing supplies from work. Checks blood glucose at home 1x/day - usually  "fasting. No complete logs to clinic.   Fasting 130-200  BG mag to 406 after drinking smoothie     HYPOGLYCEMIC EPISODES: BG drops as low as 70s overnight; has occurred twice this week. The last time this happened, she had only eaten a small salad the night before.      CURRENT DIET: drinks coffee w/ AS and creamer, water. no regular sodas.   Eats 3 meals/day but on the weekends skips lunch.     Diet recall: lunch was a salad with cucumber, spring mix, chicken. Breakfast was a yogurt, banana, and biscuit, coffee with AS and creamer. Dinner was chicken, sugar-free jello. Before that had brownie.     CURRENT EXERCISE: none recent - stationary bike accidentally discarded.     SOCIAL: ; motivated to be healthy for grandparents.     DENTAL: made appt with dentist. Brushes her teeth once daily. Also has partial dentures.       /81 (BP Location: Right arm, Patient Position: Sitting, BP Method: Large (Automatic))   Pulse 110   Ht 5' 3" (1.6 m)   Wt 102.6 kg (226 lb 3.1 oz)   LMP 11/04/2013   BMI 40.07 kg/m²       ROS:   CONSTITUTIONAL: Appetite good, denies fatigue  GI: No nausea, vomiting, or diarrhea  OTHER: n/a      PHYSICAL EXAM:  GENERAL: Well developed, well nourished. No acute distress.   PSYCH: AAOx3, appropriate mood and affect, conversant, well-groomed. Judgement and insight good.   NEURO: Cranial nerves grossly intact. Speech clear, no tremor.   CHEST: Respirations even and unlabored.         Hemoglobin A1C   Date Value Ref Range Status   09/14/2018 9.9 (H) 4.0 - 5.6 % Final     Comment:     ADA Screening Guidelines:  5.7-6.4%  Consistent with prediabetes  >or=6.5%  Consistent with diabetes  High levels of fetal hemoglobin interfere with the HbA1C  assay. Heterozygous hemoglobin variants (HbS, HgC, etc)do  not significantly interfere with this assay.   However, presence of multiple variants may affect accuracy.     05/17/2018 10.6 (H) 4.0 - 5.6 % Final     Comment:     According to ADA " guidelines, hemoglobin A1c <7.0% represents  optimal control in non-pregnant diabetic patients. Different  metrics may apply to specific patient populations.   Standards of Medical Care in Diabetes-2016.  For the purpose of screening for the presence of diabetes:  <5.7%     Consistent with the absence of diabetes  5.7-6.4%  Consistent with increasing risk for diabetes   (prediabetes)  >or=6.5%  Consistent with diabetes  Currently, no consensus exists for use of hemoglobin A1c  for diagnosis of diabetes for children.  This Hemoglobin A1c assay has significant interference with fetal   hemoglobin   (HbF). The results are invalid for patients with abnormal amounts of   HbF,   including those with known Hereditary Persistence   of Fetal Hemoglobin. Heterozygous hemoglobin variants (HbAS, HbAC,   HbAD, HbAE, HbA2) do not significantly interfere with this assay;   however, presence of multiple variants in a sample may impact the %   interference.     03/09/2018 12.6 (H) 4.0 - 5.6 % Final     Comment:     According to ADA guidelines, hemoglobin A1c <7.0% represents  optimal control in non-pregnant diabetic patients. Different  metrics may apply to specific patient populations.   Standards of Medical Care in Diabetes-2016.  For the purpose of screening for the presence of diabetes:  <5.7%     Consistent with the absence of diabetes  5.7-6.4%  Consistent with increasing risk for diabetes   (prediabetes)  >or=6.5%  Consistent with diabetes  Currently, no consensus exists for use of hemoglobin A1c  for diagnosis of diabetes for children.  This Hemoglobin A1c assay has significant interference with fetal   hemoglobin   (HbF). The results are invalid for patients with abnormal amounts of   HbF,   including those with known Hereditary Persistence   of Fetal Hemoglobin. Heterozygous hemoglobin variants (HbAS, HbAC,   HbAD, HbAE, HbA2) do not significantly interfere with this assay;   however, presence of multiple variants in a  sample may impact the %   interference.             Chemistry        Component Value Date/Time     09/14/2018 0833    K 3.2 (L) 09/14/2018 0833     09/14/2018 0833    CO2 25 09/14/2018 0833    BUN 10 09/14/2018 0833    CREATININE 1.2 09/14/2018 0833    GLU 90 09/14/2018 0833        Component Value Date/Time    CALCIUM 9.7 09/14/2018 0833    ALKPHOS 95 09/14/2018 0833    AST 16 09/14/2018 0833    ALT 11 09/14/2018 0833    BILITOT 0.4 09/14/2018 0833    ESTGFRAFRICA 58.0 (A) 09/14/2018 0833    EGFRNONAA 50.3 (A) 09/14/2018 0833          Lab Results   Component Value Date    LDLCALC 77.0 09/29/2017        Ref. Range 9/29/2017 08:35   Cholesterol Latest Ref Range: 120 - 199 mg/dL 147   HDL Latest Ref Range: 40 - 75 mg/dL 43   LDL Cholesterol Latest Ref Range: 63.0 - 159.0 mg/dL 77.0   Total Cholesterol/HDL Ratio Latest Ref Range: 2.0 - 5.0  3.4   Triglycerides Latest Ref Range: 30 - 150 mg/dL 135     Lab Results   Component Value Date    MICALBCREAT 46.1 (H) 09/14/2018           STANDARDS of CARE:        ASA:               Last eye exam:       ASSESSMENT and PLAN:    A1C GOAL: < 7.5% if no hypoglycemia       1. Uncontrolled type 2 diabetes mellitus with complication, with long-term current use of insulin  a1c today. Will call you with results.     Continue Bydureon.     Continue Novolin 70/30 - take 70 units before breakfast and 50 units before dinner.     Start Invokana 100 mg once daily in the morning.   Reviewed s/e, risks, and precautions.     Call with any issues.     Return to clinic in 5 months with labs prior. (a1c, lipid panel).       canagliflozin (INVOKANA) 100 mg Tab    Hemoglobin A1c   2. Retinopathy due to secondary diabetes  Improve glycemic control.      3. Essential hypertension  Continue current rx    4. Morbid obesity, unspecified obesity type  Increases insulin resistance.   May lose some weight on Invokana.    5. Congestive heart failure, unspecified HF chronicity, unspecified heart  failure type  Follow up with cardiologist.   It has been a while since her lov.     Reviewed CV benefits of Invokana. This drug is also being researched for potential CHF benefit as well.        Orders Placed This Encounter   Procedures    Hemoglobin A1c     Standing Status:   Future     Standing Expiration Date:   4/1/2020        Follow-up in about 5 months (around 7/1/2019).

## 2019-02-04 ENCOUNTER — LAB VISIT (OUTPATIENT)
Dept: LAB | Facility: HOSPITAL | Age: 59
End: 2019-02-04
Attending: NURSE PRACTITIONER
Payer: COMMERCIAL

## 2019-02-04 LAB
ESTIMATED AVG GLUCOSE: 246 MG/DL
HBA1C MFR BLD HPLC: 10.2 %

## 2019-02-04 PROCEDURE — 83036 HEMOGLOBIN GLYCOSYLATED A1C: CPT

## 2019-02-04 PROCEDURE — 36415 COLL VENOUS BLD VENIPUNCTURE: CPT | Mod: PO

## 2019-02-13 ENCOUNTER — TELEPHONE (OUTPATIENT)
Dept: FAMILY MEDICINE | Facility: CLINIC | Age: 59
End: 2019-02-13

## 2019-02-13 DIAGNOSIS — G62.9 NEUROPATHY: ICD-10-CM

## 2019-02-13 DIAGNOSIS — I10 ESSENTIAL HYPERTENSION: Chronic | ICD-10-CM

## 2019-02-13 RX ORDER — ATORVASTATIN CALCIUM 40 MG/1
40 TABLET, FILM COATED ORAL DAILY
Qty: 30 TABLET | Refills: 5 | Status: SHIPPED | OUTPATIENT
Start: 2019-02-13

## 2019-02-13 RX ORDER — TRAMADOL HYDROCHLORIDE 50 MG/1
50 TABLET ORAL EVERY 12 HOURS PRN
Qty: 60 TABLET | Refills: 2 | Status: SHIPPED | OUTPATIENT
Start: 2019-02-13 | End: 2019-02-27 | Stop reason: SDUPTHER

## 2019-02-13 RX ORDER — FUROSEMIDE 40 MG/1
40 TABLET ORAL 2 TIMES DAILY
Qty: 60 TABLET | Refills: 5 | Status: SHIPPED | OUTPATIENT
Start: 2019-02-13 | End: 2020-01-01 | Stop reason: SDUPTHER

## 2019-02-13 RX ORDER — AMLODIPINE BESYLATE 10 MG/1
10 TABLET ORAL DAILY
Qty: 30 TABLET | Refills: 5 | Status: SHIPPED | OUTPATIENT
Start: 2019-02-13 | End: 2019-01-01

## 2019-02-13 RX ORDER — GABAPENTIN 800 MG/1
800 TABLET ORAL 2 TIMES DAILY
Qty: 60 TABLET | Refills: 5 | Status: SHIPPED | OUTPATIENT
Start: 2019-02-13 | End: 2020-01-01

## 2019-02-13 RX ORDER — LOSARTAN POTASSIUM 100 MG/1
100 TABLET ORAL DAILY
Qty: 30 TABLET | Refills: 5 | Status: SHIPPED | OUTPATIENT
Start: 2019-02-13 | End: 2019-01-01 | Stop reason: SDUPTHER

## 2019-02-13 RX ORDER — CARVEDILOL 6.25 MG/1
6.25 TABLET ORAL NIGHTLY
Qty: 30 TABLET | Refills: 5 | Status: SHIPPED | OUTPATIENT
Start: 2019-02-13 | End: 2019-01-01 | Stop reason: SDUPTHER

## 2019-02-13 NOTE — TELEPHONE ENCOUNTER
----- Message from Suzan Limon sent at 2/13/2019  8:44 AM CST -----  Contact: Self/ [phone]  Patient is completely out of her medication.  She would like a prescription called in.  Patient would like staff to give her a call. Thank you

## 2019-02-14 ENCOUNTER — TELEPHONE (OUTPATIENT)
Dept: FAMILY MEDICINE | Facility: CLINIC | Age: 59
End: 2019-02-14

## 2019-02-25 ENCOUNTER — TELEPHONE (OUTPATIENT)
Dept: FAMILY MEDICINE | Facility: CLINIC | Age: 59
End: 2019-02-25

## 2019-02-25 NOTE — TELEPHONE ENCOUNTER
----- Message from Brooke Garcia sent at 2/25/2019 10:25 AM CST -----  Contact: Self   Pt calling to speak to a nurse to discuss meds . [phone]

## 2019-02-26 NOTE — TELEPHONE ENCOUNTER
Spoke to pt. Advised her amputation risk is not genetic and Invokana is being studied for its benefits in patients with HF. Pt voiced understanding and agrees to continue taking it. She recently changed from taking it at night to now every morning and will send bg log for review next week.

## 2019-02-26 NOTE — TELEPHONE ENCOUNTER
Called patient who stated she wanted to discuss Invokana with Patricia Alexandra due to previous hx of Congestive Heart Failure, and family history diabetes amputations and has stomach pains. Call transferred to provider.

## 2019-02-27 ENCOUNTER — LAB VISIT (OUTPATIENT)
Dept: LAB | Facility: HOSPITAL | Age: 59
End: 2019-02-27
Attending: FAMILY MEDICINE
Payer: COMMERCIAL

## 2019-02-27 ENCOUNTER — OFFICE VISIT (OUTPATIENT)
Dept: FAMILY MEDICINE | Facility: CLINIC | Age: 59
End: 2019-02-27
Payer: COMMERCIAL

## 2019-02-27 VITALS
WEIGHT: 218.56 LBS | HEIGHT: 63 IN | TEMPERATURE: 99 F | OXYGEN SATURATION: 97 % | DIASTOLIC BLOOD PRESSURE: 70 MMHG | RESPIRATION RATE: 18 BRPM | SYSTOLIC BLOOD PRESSURE: 135 MMHG | HEART RATE: 112 BPM | BODY MASS INDEX: 38.73 KG/M2

## 2019-02-27 DIAGNOSIS — K08.89 TOOTH PAIN: ICD-10-CM

## 2019-02-27 DIAGNOSIS — I50.9 CONGESTIVE HEART FAILURE, UNSPECIFIED HF CHRONICITY, UNSPECIFIED HEART FAILURE TYPE: ICD-10-CM

## 2019-02-27 DIAGNOSIS — Z79.4 TYPE 2 DIABETES MELLITUS WITH RETINOPATHY WITHOUT MACULAR EDEMA, WITH LONG-TERM CURRENT USE OF INSULIN, UNSPECIFIED LATERALITY, UNSPECIFIED RETINOPATHY SEVERITY: Chronic | ICD-10-CM

## 2019-02-27 DIAGNOSIS — R10.9 ABDOMINAL PAIN, UNSPECIFIED ABDOMINAL LOCATION: ICD-10-CM

## 2019-02-27 DIAGNOSIS — G62.9 NEUROPATHY: ICD-10-CM

## 2019-02-27 DIAGNOSIS — E11.319 TYPE 2 DIABETES MELLITUS WITH RETINOPATHY WITHOUT MACULAR EDEMA, WITH LONG-TERM CURRENT USE OF INSULIN, UNSPECIFIED LATERALITY, UNSPECIFIED RETINOPATHY SEVERITY: Chronic | ICD-10-CM

## 2019-02-27 DIAGNOSIS — R10.9 ABDOMINAL PAIN, UNSPECIFIED ABDOMINAL LOCATION: Primary | ICD-10-CM

## 2019-02-27 DIAGNOSIS — I10 ESSENTIAL HYPERTENSION: Chronic | ICD-10-CM

## 2019-02-27 DIAGNOSIS — J44.89 CHRONIC OBSTRUCTIVE AIRWAY DISEASE WITH ASTHMA: Chronic | ICD-10-CM

## 2019-02-27 LAB
AMYLASE SERPL-CCNC: 74 U/L
LIPASE SERPL-CCNC: 46 U/L

## 2019-02-27 PROCEDURE — 82150 ASSAY OF AMYLASE: CPT

## 2019-02-27 PROCEDURE — 99999 PR PBB SHADOW E&M-EST. PATIENT-LVL IV: ICD-10-PCS | Mod: PBBFAC,,, | Performed by: FAMILY MEDICINE

## 2019-02-27 PROCEDURE — 36415 COLL VENOUS BLD VENIPUNCTURE: CPT | Mod: PO

## 2019-02-27 PROCEDURE — 99999 PR PBB SHADOW E&M-EST. PATIENT-LVL IV: CPT | Mod: PBBFAC,,, | Performed by: FAMILY MEDICINE

## 2019-02-27 PROCEDURE — 99214 OFFICE O/P EST MOD 30 MIN: CPT | Mod: S$GLB,,, | Performed by: FAMILY MEDICINE

## 2019-02-27 PROCEDURE — 99214 PR OFFICE/OUTPT VISIT, EST, LEVL IV, 30-39 MIN: ICD-10-PCS | Mod: S$GLB,,, | Performed by: FAMILY MEDICINE

## 2019-02-27 PROCEDURE — 83690 ASSAY OF LIPASE: CPT

## 2019-02-27 RX ORDER — PANTOPRAZOLE SODIUM 20 MG/1
20 TABLET, DELAYED RELEASE ORAL DAILY
Qty: 30 TABLET | Refills: 11 | Status: SHIPPED | OUTPATIENT
Start: 2019-02-27 | End: 2020-01-01 | Stop reason: SDUPTHER

## 2019-02-27 RX ORDER — ALBUTEROL SULFATE 90 UG/1
2 AEROSOL, METERED RESPIRATORY (INHALATION) EVERY 6 HOURS PRN
Qty: 18 G | Refills: 5 | Status: SHIPPED | OUTPATIENT
Start: 2019-02-27

## 2019-02-27 RX ORDER — AMOXICILLIN AND CLAVULANATE POTASSIUM 875; 125 MG/1; MG/1
1 TABLET, FILM COATED ORAL 2 TIMES DAILY
Qty: 20 TABLET | Refills: 0 | Status: SHIPPED | OUTPATIENT
Start: 2019-02-27 | End: 2019-03-09

## 2019-02-27 RX ORDER — NORTRIPTYLINE HYDROCHLORIDE 25 MG/1
25 CAPSULE ORAL NIGHTLY
Qty: 30 CAPSULE | Refills: 5 | Status: SHIPPED | OUTPATIENT
Start: 2019-02-27 | End: 2019-01-01 | Stop reason: SDUPTHER

## 2019-02-27 RX ORDER — TRAMADOL HYDROCHLORIDE 50 MG/1
50 TABLET ORAL EVERY 12 HOURS PRN
Qty: 60 TABLET | Refills: 2 | Status: SHIPPED | OUTPATIENT
Start: 2019-02-27 | End: 2019-01-01 | Stop reason: SDUPTHER

## 2019-02-27 NOTE — PROGRESS NOTES
Chief Complaint   Patient presents with    Gas    Jaw Pain    Abdominal Pain       HPI  Michelle Donohue is a 59 y.o. female with multiple medical diagnoses as listed in the medical history and problem list that presents for follow-up for hypertension and diabetes. She has concerns about gas, abdominal pain and jaw pain    Abdominal pain and gas- worse after eating, she has been having to take more tums, she is not sure if it is affected by foods. She also has pain in her lower back    Jaw pain- she is having some pain in her teeth on the left side    DM- has had some concerns about taking invokana due to risk of amputation and has not been taking it for two weeks. She has now started taking it and has noted a drop in her sugars in the AM in the 100s prandials are still in the 200s    PAST MEDICAL HISTORY:  Past Medical History:   Diagnosis Date    Asthma     Diabetes mellitus, type 2     Diabetic neuropathy     Diabetic retinopathy     Epiretinal membrane, left eye     Heart failure     Hyperlipidemia     Hypertension     Retinal detachment     Sickle cell trait     Vitreous hemorrhage of both eyes        PAST SURGICAL HISTORY:  Past Surgical History:   Procedure Laterality Date    Avastin Injection Bilateral     BREAST BIOPSY      age 16    BREAST LUMPECTOMY      right breast    BTL      CATARACT EXTRACTION W/  INTRAOCULAR LENS IMPLANT Left 3/25/2015    OS (Dr. Montes)    CATARACT EXTRACTION W/  INTRAOCULAR LENS IMPLANT Right 6/17/15    OD ()    INSERTION-INTRAOCULAR LENS (IOL) Right 6/17/2015    [facility]    INSERTION-INTRAOCULAR LENS (IOL) Left 3/25/2015    [facility]    PANRETINAL PHOTOCOAGULATION      Both eyes    PHACOEMULSIFICATION-ASPIRATION-CATARACT Right 6/17/2015    [facility]ST [facility]LR    PHACOEMULSIFICATION-ASPIRATION-CATARACT Left 3/25/2015     [facility] OR [address] [place] [address]       SOCIAL HISTORY:  Social History     Socioeconomic History    Marital status: Single     Spouse name: Not on file    Number of children: 2    Years of education: Not on file    Highest education level: Not on file   Social Needs    Financial resource strain: Not on file    Food insecurity - worry: Not on file    Food insecurity - inability: Not on file    Transportation needs - medical: Not on file    Transportation needs - non-medical: Not on file   Occupational History    Occupation:    Tobacco Use    Smoking status: Never Smoker    Smokeless tobacco: Never Used   Substance and Sexual Activity    Alcohol use: No     Alcohol/week: 0.0 oz    Drug use: No    Sexual activity: Yes     Partners: Male     Comment: . 2 children. works as a .   Other Topics Concern    Not on file   Social History Narrative    Not on file       FAMILY HISTORY:  Family History   Problem Relation Age of Onset    Heart failure Father 60            Hyperlipidemia Father 60            Hypertension Father 60            Heart attack Father     Diabetes Mother     Hypertension Mother     Heart failure Mother     Diabetes Brother     Heart failure Brother     Hyperlipidemia Brother     Hypertension Brother     Heart failure Sister        ALLERGIES AND MEDICATIONS: updated and reviewed.  Review of patient's allergies indicates:   Allergen Reactions    Lisinopril Other (See Comments)     cough     Current Outpatient Medications   Medication Sig Dispense Refill    albuterol (PROAIR HFA) 90 mcg/actuation inhaler Inhale 2 puffs into the lungs every 6 (six) hours as needed. 18 g 5    amLODIPine (NORVASC) 10 MG tablet Take 1 tablet (10 mg total) by mouth once daily. 30 tablet 5    atorvastatin (LIPITOR) 40 MG tablet Take 1 tablet (40 mg total) by mouth once daily. 30 tablet 5    canagliflozin (INVOKANA)  100 mg Tab Take 1 tablet (100 mg total) by mouth once daily. 30 tablet 5    carvedilol (COREG) 6.25 MG tablet Take 1 tablet (6.25 mg total) by mouth every evening. 30 tablet 5    cloNIDine (CATAPRES) 0.1 MG tablet Take 1 tablet (0.1 mg total) by mouth 2 (two) times daily. 60 tablet 5    exenatide microspheres (BYDUREON) 2 mg/0.65 mL PnIj INJECT 2MG  SUBCUTANEOUSLY ONCE A WEEK 4 each 2    furosemide (LASIX) 40 MG tablet Take 1 tablet (40 mg total) by mouth 2 (two) times daily. 60 tablet 5    gabapentin (NEURONTIN) 800 MG tablet Take 1 tablet (800 mg total) by mouth 2 (two) times daily. 60 tablet 5    insulin NPH-insulin regular, 70/30, (NOVOLIN 70/30) 100 unit/mL (70-30) injection Inject into the skin 2 (two) times daily. 70 units before breakfast and 50 units before dinner      losartan (COZAAR) 100 MG tablet Take 1 tablet (100 mg total) by mouth once daily. 30 tablet 5    nortriptyline (PAMELOR) 25 MG capsule Take 1 capsule (25 mg total) by mouth every evening. 30 capsule 5    potassium chloride SA (K-DUR,KLOR-CON) 20 MEQ tablet Take 1 tablet (20 mEq total) by mouth 2 (two) times daily. 60 tablet 5    traMADol (ULTRAM) 50 mg tablet Take 1 tablet (50 mg total) by mouth every 12 (twelve) hours as needed. 60 tablet 2    amoxicillin-clavulanate 875-125mg (AUGMENTIN) 875-125 mg per tablet Take 1 tablet by mouth 2 (two) times daily. for 10 days 20 tablet 0    pantoprazole (PROTONIX) 20 MG tablet Take 1 tablet (20 mg total) by mouth once daily. 30 tablet 11    podofilox (CONDYLOX) 0.5 % external solution Apply topically 2 (two) times daily. 3.5 mL 0     Current Facility-Administered Medications   Medication Dose Route Frequency Provider Last Rate Last Dose    albuterol nebulizer solution 2.5 mg  2.5 mg Nebulization 1 time in Clinic/HOD MD YTLER [place]  Review of Systems   Constitutional: Negative for chills, diaphoresis, fatigue, fever and unexpected weight change.   HENT: Negative for  "rhinorrhea, sinus pressure, sore throat and tinnitus.    Eyes: Negative for photophobia and visual disturbance.   Respiratory: Negative for cough, shortness of breath and wheezing.    Cardiovascular: Negative for chest pain and palpitations.   Gastrointestinal: Positive for abdominal pain. Negative for blood in stool, constipation, diarrhea, nausea and vomiting.   Genitourinary: Negative for dysuria, flank pain, frequency and vaginal discharge.   Musculoskeletal: Negative for arthralgias and joint swelling.   Skin: Negative for rash.   Neurological: Negative for speech difficulty, weakness, light-headedness and headaches.   Psychiatric/Behavioral: Negative for behavioral problems and dysphoric mood.       Physical Exam  Vitals:    02/27/19 0902   BP: 135/70   Pulse: (!) 112   Resp: 18   Temp: 98.6 °F (37 °C)   TempSrc: Oral   SpO2: 97%   Weight: 99.2 kg (218 lb 9.4 oz)   Height: 5' 3" (1.6 m)    Body mass index is 38.72 kg/m².  Weight: 99.2 kg (218 lb 9.4 oz)   Height: 5' 3" (160 cm)     Physical Exam   Constitutional: She is oriented to person, place, and time. She appears well-developed and well-nourished. No distress.   HENT:   Head: Normocephalic and atraumatic.   Mouth/Throat:       Eyes: EOM are normal.   Neck: Neck supple.   Cardiovascular: Normal rate and regular rhythm. Exam reveals no gallop and no friction rub.   No murmur heard.  Pulmonary/Chest: Effort normal and breath sounds normal. No respiratory distress. She has no wheezes. She has no rales.   Abdominal: Soft. Bowel sounds are normal. She exhibits no distension and no mass. There is tenderness. There is no rebound and no guarding. No hernia.   Lower abdominal TTP   Lymphadenopathy:     She has no cervical adenopathy.   Neurological: She is alert and oriented to person, place, and time.   Skin: Skin is warm and dry. No rash noted.   Psychiatric: She has a normal mood and affect. Her behavior is normal.   Nursing note and vitals reviewed.      Health " Maintenance       Date Due Completion Date    Pneumococcal Vaccine (Highest Risk) (1 of 3 - PCV13) 02/27/1980 ---    Pap Smear with HPV Cotest 02/27/1982 ---    Lipid Panel 09/29/2018 9/29/2017    Hemoglobin A1c 05/04/2019 2/4/2019    Urine Microalbumin 09/14/2019 9/14/2018    Eye Exam 10/02/2019 10/2/2018    Override on 2/10/2016: Done    Foot Exam 10/10/2019 10/10/2018 (Done)    Override on 10/10/2018: Done    Override on 9/29/2017: Done    Mammogram 10/17/2019 10/17/2017    Fecal Occult Blood Test (FOBT)/[facility] 12/04/2019 12/4/2018    Override on 9/22/2017: Done    Low Dose Statin 02/27/2020 2/27/2019    TETANUS VACCINE 04/27/2027 4/27/2017 (Declined)    Override on 4/27/2017: Declined            ASSESSMENT     1. Abdominal pain, unspecified abdominal location    2. Type 2 diabetes mellitus, uncontrolled, with neuropathy    3. Neuropathy    4. Type 2 diabetes mellitus with retinopathy without macular edema, with long-term current use of insulin, unspecified laterality, unspecified retinopathy severity    5. Congestive heart failure, unspecified HF chronicity, unspecified heart failure type    6. Essential hypertension    7. Chronic obstructive airway disease with asthma    8. Tooth pain        PLAN:     Problem List Items Addressed This Visit        Ophtho    Type 2 diabetes mellitus with retinopathy without macular edema (Chronic)  -continue f/u with endocrine  -last A1C 10, f/u with endocrine    Relevant Medications    exenatide microspheres (BYDUREON) 2 mg/0.65 mL PnIj       Pulmonary    Chronic obstructive airway disease with asthma (Chronic)  -for prn use    Relevant Medications    albuterol (PROAIR HFA) 90 mcg/actuation inhaler       Cardiac/Vascular    Essential hypertension (Chronic)  -stable    CHF (congestive heart failure)  -stable on current regimen       Endocrine    Type 2 diabetes mellitus, uncontrolled, with neuropathy (Chronic)  -continue inovkana  -bydureon and 70/30  -f/u with endocrine, log is  showing improvement in AM sugars    Relevant Medications    traMADol (ULTRAM) 50 mg tablet    nortriptyline (PAMELOR) 25 MG capsule    exenatide microspheres (BYDUREON) 2 mg/0.65 mL PnIj      Other Visit Diagnoses     Abdominal pain, unspecified abdominal location    -  Primary  -check pancreatic enzymes, she is taking invokana  -begin PPI    Relevant Medications    pantoprazole (PROTONIX) 20 MG tablet    Other Relevant Orders    Amylase    Lipase    Neuropathy      -continue for prn use    Relevant Medications    traMADol (ULTRAM) 50 mg tablet    Tooth pain      -she will see a dentist ASAP    Relevant Medications    amoxicillin-clavulanate 875-125mg (AUGMENTIN) 875-125 mg per tablet            Rosalie Brady MD  02/27/2019 9:57 AM        Follow-up in about 3 months (around 5/27/2019) for Follow up.

## 2019-02-27 NOTE — PATIENT INSTRUCTIONS
For a stool softener you can take colace once daily start with 100mg then increase to 200mg if needed

## 2019-02-28 ENCOUNTER — PATIENT MESSAGE (OUTPATIENT)
Dept: FAMILY MEDICINE | Facility: CLINIC | Age: 59
End: 2019-02-28

## 2019-02-28 ENCOUNTER — TELEPHONE (OUTPATIENT)
Dept: FAMILY MEDICINE | Facility: CLINIC | Age: 59
End: 2019-02-28

## 2019-02-28 NOTE — TELEPHONE ENCOUNTER
----- Message from Brooke Garcia sent at 2/28/2019 10:08 AM CST -----  Contact: Self   Pt calling to let nurse know that not all meds was called into pharmacy. [phone].

## 2019-03-04 ENCOUNTER — TELEPHONE (OUTPATIENT)
Dept: FAMILY MEDICINE | Facility: CLINIC | Age: 59
End: 2019-03-04

## 2019-03-05 NOTE — TELEPHONE ENCOUNTER
----- Message from Mariann Villalta sent at 3/4/2019 11:11 AM CST -----  Contact: Edwin with [facility] -[phone]  Rep asking to speak to nurse regarding---traMADol (ULTRAM) 50 mg tablet-- prescription. Please contact back at earliest convenience.

## 2019-03-27 NOTE — TELEPHONE ENCOUNTER
----- Message from Mariann Villalta sent at 3/26/2019 12:51 PM CDT -----  Contact: self - [phone]  Pt asking for a prescription for her insulin of a 1 month supply to be sent the pharmacy.        ..  [facility] [address] [place] [address] ([place], [place] - [address] [facility]  [address] [facility] ([place] [address]  Phone: [phone] Fax: [phone]

## 2019-03-27 NOTE — TELEPHONE ENCOUNTER
Called Patient.  No answer.  Left voice message advising Patient that her medication was Rx'ed by Dr. Brady..

## 2019-05-03 NOTE — TELEPHONE ENCOUNTER
----- Message from Radha Malloryangelfelton sent at 5/3/2019  9:36 AM CDT -----  Contact: Self   Type: RX Refill Request    Who Called: Self     Refill or New Rx: Refill     RX Name and Strength:amLODIPine (NORVASC) 10 MG tablet  & Patient is asking for Insulin    How is the patient currently taking it? (ex. 1XDay): Call   Is this a 30 day or 90 day RX: 30    Preferred Pharmacy with phone number: [facility] [address] [place] [address] ([place], [address] [phone] (Phone)  [phone] (Fax)      Local or Mail Order: local     Ordering Provider: Jose Antonio     Would the patient rather a call back or a response via [facility]? Call   Best Call Back Number: [phone] she'll be there until 4:30

## 2019-05-06 NOTE — TELEPHONE ENCOUNTER
----- Message from Elvira Hollins sent at 5/6/2019  3:26 PM CDT -----  Contact: self  Type: Patient Call Back    Who called : self    What is the request in detail: patient states directions wrong on insulin Script  insulin NPH-insulin regular, 70/30, (NOVOLIN 70/30) 100 unit/mL (70-30) injection    Can the clinic reply by [facility]?no    Would the patient rather a call back or a response via [facility]?  call    Best call back number:[phone] until 4:30pm    [facility] [place] ([place], [place] - [address] [facility]   [phone] (Phone)  [phone] (Fax)

## 2019-05-06 NOTE — TELEPHONE ENCOUNTER
Patient contacted by phone and informed of the problem with her most recent prescription.  Informed that a new prescription will be sent to the pharmacy.  Verbalized understanding.  OV previously scheduled for 05/27/2019.

## 2019-05-06 NOTE — TELEPHONE ENCOUNTER
Brittney @ [facility] states that Humulin 70/30 is what the patient has been receiving and it is covered, but not the Novolin 70/30.  Brittney states that Humulin 70/30 was previously transcribed instead of Novolin 70/30 apparently for insurance coverage.  She consulted the pharmacist and states that the prescription for Humulin can be filled with PCP authorization.  Dr. Brady notified and gave approval to fill the prescription for Humulin 70/30.  Patient notified.

## 2019-05-06 NOTE — TELEPHONE ENCOUNTER
Returned patient call stating the wrong insulin was sent to the pharmacy because her insurance does not cover it.  Voicemail message left for patient to call regarding this as the same medication was called in as previously.

## 2019-05-06 NOTE — TELEPHONE ENCOUNTER
Patient states that the pharmacy notified her that the   Insulin that was ordered this morning is not covered and needs to be for Humulin and not Novolin.  Informed that the medication sent this morning is the exact medication sent on 3/27 that she previously filled.  Informed that the pharmacy will be contacted.

## 2019-07-08 NOTE — TELEPHONE ENCOUNTER
----- Message from Honey Mag sent at 7/8/2019  8:11 AM CDT -----  Contact: self [phone]  .Type: RX Refill Request    Who Called:  Self     Refill or New Rx: refill    RX Name and Strength: exenatide microspheres (BYDUREON) 2 mg/0.65 mL PnIj    Is this a 30 day or 90 day RX: 90 day     Preferred Pharmacy with phone number: .  [facility] - [place] ([place], [place] - [address]  [address] [facility] [address]  Phone: [phone] Fax: [phone]    Would the patient rather a call back or a response via [facility]? Call back     Best Call Back Number: [phone]

## 2019-08-16 NOTE — PROGRESS NOTES
Some improvement since your last test, please keep you endocrine appointment or schedule with me if you are unable to make this appointment as medication adjustments need to be made to get your sugar at goal    Rosalie Brady MD

## 2019-08-19 NOTE — PROGRESS NOTES
CC: This 59 y.o. Black or  female  is here for evaluation of  T2DM along with comorbidities indicated in the Visit Diagnosis section of this encounter.    HPI: Michelle Donohue was diagnosed with T2DM in the late 1980s.       Prior visit 2/2019  No recent a1c available.   She is now taking 50 units before supper instead of 80 units.   Plan a1c today. Will call you with results.   Continue Bydureon.   Continue Novolin 70/30 - take 70 units before breakfast and 50 units before dinner.   Start Invokana 100 mg once daily in the morning.   Reviewed s/e, risks, and precautions.   Call with any issues.   Return to clinic in 5 months with labs prior. (a1c, lipid panel).       Interval history  a1c is a bit lower from 10.2 to 9.4%. She is disappointed that her A1c is not lower. This is actually the lowest it's been x 6 years. She has been avoiding sweets.   She did start Invokana and had a mild yeast infection. Had increased PM dose of insulin from 50 to 55 units? She's     LAST DIABETES EDUCATION: 3/9/18     DIABETES MEDICATIONS: Bydureon 2 mg once weekly, Invokana 100 mg once daily,  Novolin 70/30 - 70 units before breakfast and 55 units before supper    Misses medication doses - skips evening dose about 1x every month - improved. Relies on her alarms for her insulin doses.   Purchases insulin vials from [employer] so monthly cost is > $100.      DM COMPLICATIONS: retinopathy and peripheral neuropathy    SIGNIFICANT DIABETES MED HISTORY:   Metformin dc'd d/t heart failure - pt states that she had recurrent heart failure that resolved once her metformin was stopped in 2013.       SELF MONITORING BLOOD GLUCOSE: uses testing supplies from work. Checks blood glucose at home 1-2x/day - usually fasting. No complete logs to clinic.   Fasting 120-180s.     HYPOGLYCEMIC EPISODES: BG drops to 50-60s overnight about 1x/week.   Infrequently during the daytime.      CURRENT DIET: drinks coffee w/ AS and creamer, water. no  regular sodas.   Eats 3 meals/day but on the weekends skips lunch. Lunch is a salad at work.     CURRENT EXERCISE: just started back     SOCIAL: ; motivated to be healthy for grandparents.     DENTAL: made appt with dentist. Brushes her teeth once daily. Also has partial dentures.       /80 (BP Location: Right arm, Patient Position: Sitting, BP Method: Large (Automatic))   Pulse 98   Wt 99 kg (218 lb 4.8 oz)   LMP 11/04/2013   BMI 38.67 kg/m²       ROS:   CONSTITUTIONAL: Appetite good, denies fatigue  : No dysuria or pruritis   OTHER: n/a      PHYSICAL EXAM:  GENERAL: Well developed, well nourished. No acute distress.   PSYCH: AAOx3, appropriate mood and affect, conversant, well-groomed. Judgement and insight good.   NEURO: Cranial nerves grossly intact. Speech clear, no tremor.   CHEST: Respirations even and unlabored.         Hemoglobin A1C   Date Value Ref Range Status   08/16/2019 9.4 (H) 4.0 - 5.6 % Final     Comment:     ADA Screening Guidelines:  5.7-6.4%  Consistent with prediabetes  >or=6.5%  Consistent with diabetes  High levels of fetal hemoglobin interfere with the HbA1C  assay. Heterozygous hemoglobin variants (HbS, HgC, etc)do  not significantly interfere with this assay.   However, presence of multiple variants may affect accuracy.     08/16/2019 9.4 (H) 4.0 - 5.6 % Final     Comment:     ADA Screening Guidelines:  5.7-6.4%  Consistent with prediabetes  >or=6.5%  Consistent with diabetes  High levels of fetal hemoglobin interfere with the HbA1C  assay. Heterozygous hemoglobin variants (HbS, HgC, etc)do  not significantly interfere with this assay.   However, presence of multiple variants may affect accuracy.     02/04/2019 10.2 (H) 4.0 - 5.6 % Final     Comment:     ADA Screening Guidelines:  5.7-6.4%  Consistent with prediabetes  >or=6.5%  Consistent with diabetes  High levels of fetal hemoglobin interfere with the HbA1C  assay. Heterozygous hemoglobin variants (HbS, HgC,  etc)do  not significantly interfere with this assay.   However, presence of multiple variants may affect accuracy.             Chemistry        Component Value Date/Time     09/14/2018 0833    K 3.2 (L) 09/14/2018 0833     09/14/2018 0833    CO2 25 09/14/2018 0833    BUN 10 09/14/2018 0833    CREATININE 1.2 09/14/2018 0833    GLU 90 09/14/2018 0833        Component Value Date/Time    CALCIUM 9.7 09/14/2018 0833    ALKPHOS 95 09/14/2018 0833    AST 16 09/14/2018 0833    ALT 11 09/14/2018 0833    BILITOT 0.4 09/14/2018 0833    ESTGFRAFRICA 58.0 (A) 09/14/2018 0833    EGFRNONAA 50.3 (A) 09/14/2018 0833          Lab Results   Component Value Date    LDLCALC 78.6 08/16/2019        Ref. Range 8/16/2019 09:00   Cholesterol Latest Ref Range: 120 - 199 mg/dL 145   HDL Latest Ref Range: 40 - 75 mg/dL 38 (L)   Hdl/Cholesterol Ratio Latest Ref Range: 20.0 - 50.0 % 26.2   LDL Cholesterol External Latest Ref Range: 63.0 - 159.0 mg/dL 78.6   Non-HDL Cholesterol Latest Units: mg/dL 107   Total Cholesterol/HDL Ratio Latest Ref Range: 2.0 - 5.0  3.8   Triglycerides Latest Ref Range: 30 - 150 mg/dL 142       Lab Results   Component Value Date    MICALBCREAT 26.8 08/16/2019             ASSESSMENT and PLAN:    A1C GOAL: < 7.5% if no hypoglycemia     1. Uncontrolled type 2 diabetes mellitus with complication, with long-term current use of insulin  Try to exercise 30 minutes five days a week.   Finish off Bydureon. Switch to Ozempic at 0.5 mg once weekly x 4 weeks. Then increase to 1 mg once weekly.   Start Freestyle Federico glucose monitoring system.    Monitor blood sugar every 8 hours in order to get a continuous glucose reading from the Freestyle Federico. Sample kit provided   Test blood sugar before each meal and bedtime.     Make sure to eat a snack at bedtime if blood sugar is less than 120. - 1/2 piece of fruit with some nuts; or 1 slice of wheat bread with peanut butter.     See Diabetes Educator/Registered Dietician for  Medical Nutrition Therapy refresher and Federico training.     Return to clinic in 3 months with labs prior.         Ambulatory Referral to Diabetes Education    Basic metabolic panel    Hemoglobin A1c   2. Retinopathy due to secondary diabetes  Improve glycemic control.      3. Essential hypertension  controlled   4. Congestive heart failure, unspecified HF chronicity, unspecified heart failure type  Improve glycemic control.        Orders Placed This Encounter   Procedures    Basic metabolic panel     Standing Status:   Future     Standing Expiration Date:   10/18/2020    Hemoglobin A1c     Standing Status:   Future     Standing Expiration Date:   10/18/2020    Ambulatory Referral to Diabetes Education     Referral Priority:   Routine     Referral Type:   Consultation     Referral Reason:   Specialty Services Required     Requested Specialty:   Endocrinology     Number of Visits Requested:   1     Expiration Date:   8/20/2020        Follow up in about 3 months (around 11/20/2019).

## 2019-08-20 NOTE — PATIENT INSTRUCTIONS
Try to exercise 30 minutes five days a week.   Finish off Bydureon. Switch to Ozempic at 0.5 mg once weekly x 4 weeks. Then increase to 1 mg once weekly.   Start Freestyle Federico glucose monitoring system.    Monitor blood sugar every 8 hours in order to get a continuous glucose reading from the Freestyle Federico.   Test blood sugar before each meal and bedtime.     Make sure to eat a snack at bedtime if blood sugar is less than 120. - 1/2 piece of fruit with some nuts; or 1 slice of wheat bread with peanut butter.     See Diabetes Educator/Registered Dietician for Medical Nutrition Therapy refresher and Federico training.     Return to clinic in 3 months with labs prior.

## 2019-08-26 NOTE — TELEPHONE ENCOUNTER
Patient appointment scheduled for 9/9/19 at 12:00.    Patient wants to know if she can have a rx for enough meds until appointment.    Please advise..    Thanks,  Lindsay

## 2019-08-26 NOTE — TELEPHONE ENCOUNTER
Jay Martinez needs an appointment for further refills. Please schedule and medication can be filled at appointment.    Rosalie Brady MD

## 2019-08-29 NOTE — TELEPHONE ENCOUNTER
----- Message from Ghazal Choe LPN sent at 8/29/2019  1:35 PM CDT -----  Contact: Self: [phone]      ----- Message -----  From: Sherry Coronado  Sent: 8/29/2019   1:10 PM  To: Nasrin Gomez Staff    Type: Patient Call Back    Who called:Self    What is the request in detail:Insurance company stated  Needs to call them in regards to [employer]  Equipment, for the patient to get Freestyle device so that she no longer has to stick herself.    Can the clinic reply by [facility]? No    Would the patient rather a call back or a response via [facility]? Callback    Best call back number:[phone]    Additional Information:  R  # [phone]

## 2019-08-30 NOTE — TELEPHONE ENCOUNTER
Unable to reach live person call placed on 8/29 and 8/30, requested fax information regarding Freestyle Federico PA

## 2019-09-09 PROBLEM — G62.9 NEUROPATHY: Status: ACTIVE | Noted: 2019-01-01

## 2019-09-09 NOTE — TELEPHONE ENCOUNTER
----- Message from Aminata Toussaint sent at 9/9/2019  1:55 PM CDT -----  Contact: [facility] [phone]  Type:  Pharmacy Calling to Clarify an RX    Name of Caller: Edwin    Pharmacy Name:   [facility] [address] [place] [address] ([place], [place] - [address]  [address] [facility] ([place] [address]  Phone: [phone] Fax: [phone]    Prescription Name: traMADol (ULTRAM) 50 mg tablet    What do they need to clarify? Pharmacy is requesting chronic documentation because it has been about 6 months since they received anything on the patient.    Best Call Back Number: [phone]

## 2019-09-09 NOTE — TELEPHONE ENCOUNTER
Edwin requesting a diagnosis and information about any x-rays, therapy, or tests the patient has tried as an alternative to tramadol.  Informed that she will be contacted with more information.

## 2019-09-09 NOTE — TELEPHONE ENCOUNTER
What do they mean by chronic documentation, do they need notes? Whatever they need they can have, not sure about this one    Rosalie Brady MD

## 2019-09-09 NOTE — PROGRESS NOTES
Chief Complaint   Patient presents with    Diabetes       HPI  Michelle Donohue is a 59 y.o. female with multiple medical diagnoses as listed in the medical history and problem list that presents for follow-up for DM, HTN    DM- prior to eating BG in the 100s but after eating she is seeing 200 and at times close to 300. She is on bydureon but has been changed to ozempic. Is on the freestyle meter    bydureon 70/30  novolin 70/30  invokana 100mg    PAST MEDICAL HISTORY:  Past Medical History:   Diagnosis Date    Asthma     Diabetes mellitus, type 2     Diabetic neuropathy     Diabetic retinopathy     Epiretinal membrane, left eye     Heart failure     Hyperlipidemia     Hypertension     Retinal detachment     Sickle cell trait     Vitreous hemorrhage of both eyes        PAST SURGICAL HISTORY:  Past Surgical History:   Procedure Laterality Date    Avastin Injection Bilateral     BREAST BIOPSY      age 16    BREAST LUMPECTOMY      right breast    BTL      CATARACT EXTRACTION W/  INTRAOCULAR LENS IMPLANT Left 3/25/2015    OS (Dr. Montes)    CATARACT EXTRACTION W/  INTRAOCULAR LENS IMPLANT Right 6/17/15    OD ()    INSERTION-INTRAOCULAR LENS (IOL) Right 6/17/2015    Performed by [facility] at [facility] OR [address] [place] [address]    INSERTION-INTRAOCULAR LENS (IOL) Left 3/25/2015    Performed by [facility] MD at [facility] OR [address] [place] [address]    PANRETINAL PHOTOCOAGULATION      Both eyes    PHACOEMULSIFICATION-ASPIRATION-CATARACT Right 6/17/2015    Performed by [facility] at [facility] OR [address] [place] [address]    PHACOEMULSIFICATION-ASPIRATION-CATARACT Left 3/25/2015    Performed by [facility]es MD at [facility] OR [address] [place] [address]       SOCIAL HISTORY:  Social History     Socioeconomic History    Marital status: Single     Spouse name: Not on file    Number of children: 2    Years of education: Not on file    Highest education level: Not on file   Occupational History    Occupation:    Social  Needs    Financial resource strain: Not on file    Food insecurity:     Worry: Not on file     Inability: Not on file    Transportation needs:     Medical: Not on file     Non-medical: Not on file   Tobacco Use    Smoking status: Never Smoker    Smokeless tobacco: Never Used   Substance and Sexual Activity    Alcohol use: No     Alcohol/week: 0.0 oz    Drug use: No    Sexual activity: Yes     Partners: Male     Comment: . 2 children. works as a .   Lifestyle    Physical activity:     Days per week: Not on file     Minutes per session: Not on file    Stress: Not on file   Relationships    Social connections:     Talks on phone: Not on file     Gets together: Not on file     Attends Orthodoxy service: Not on file     Active member of club or organization: Not on file     Attends meetings of clubs or organizations: Not on file     Relationship status: Not on file   Other Topics Concern    Not on file   Social History Narrative    Not on file       FAMILY HISTORY:  Family History   Problem Relation Age of Onset    Heart failure Father 60            Hyperlipidemia Father 60            Hypertension Father 60            Heart attack Father     Diabetes Mother     Hypertension Mother     Heart failure Mother     Diabetes Brother     Heart failure Brother     Hyperlipidemia Brother     Hypertension Brother     Heart failure Sister        ALLERGIES AND MEDICATIONS: updated and reviewed.  Review of patient's allergies indicates:   Allergen Reactions    Lisinopril Other (See Comments)     cough     Current Outpatient Medications   Medication Sig Dispense Refill    albuterol (PROAIR HFA) 90 mcg/actuation inhaler Inhale 2 puffs into the lungs every 6 (six) hours as needed. 18 g 5    amLODIPine (NORVASC) 10 MG tablet Take 1 tablet (10 mg total) by mouth once daily. 30 tablet 5    atorvastatin (LIPITOR) 40 MG tablet Take 1 tablet (40 mg total) by mouth once  daily. 30 tablet 5    canagliflozin (INVOKANA) 100 mg Tab Take 1 tablet (100 mg total) by mouth once daily. 30 tablet 5    carvedilol (COREG) 6.25 MG tablet Take 1 tablet (6.25 mg total) by mouth every evening. 30 tablet 5    cloNIDine (CATAPRES) 0.1 MG tablet TAKE 1 TABLET BY MOUTH TWICE DAILY 60 tablet 5    diabetic supplies, miscellan. Kit Please change sensor every 14 days. FreeStyle Federico Sensor 30-day supply (2 sensors/month) 2 kit 5    furosemide (LASIX) 40 MG tablet Take 1 tablet (40 mg total) by mouth 2 (two) times daily. 60 tablet 5    gabapentin (NEURONTIN) 800 MG tablet Take 1 tablet (800 mg total) by mouth 2 (two) times daily. 60 tablet 5    insulin NPH-insulin regular, 70/30, (NOVOLIN 70/30) 100 unit/mL (70-30) injection 70 units before breakfast and 50 units before dinner 40 mL 5    losartan (COZAAR) 100 MG tablet Take 1 tablet (100 mg total) by mouth once daily. 30 tablet 5    pantoprazole (PROTONIX) 20 MG tablet Take 1 tablet (20 mg total) by mouth once daily. 30 tablet 11    semaglutide (OZEMPIC) 1 mg/dose (2 mg/1.5 mL) PnIj Inject 1 mg once weekly 2 Syringe 2    traMADol (ULTRAM) 50 mg tablet Take 1 tablet (50 mg total) by mouth every 12 (twelve) hours as needed. 60 tablet 2    nortriptyline (PAMELOR) 25 MG capsule Take 1 capsule (25 mg total) by mouth every evening. 30 capsule 5    potassium chloride SA (K-DUR,KLOR-CON) 20 MEQ tablet TAKE 1 TABLET BY MOUTH TWICE DAILY 180 tablet 3     Current Facility-Administered Medications   Medication Dose Route Frequency Provider Last Rate Last Dose    albuterol nebulizer solution 2.5 mg  2.5 mg Nebulization 1 time in Clinic/HOD MD TYLER Huddleston  Review of Systems   Constitutional: Negative for chills, diaphoresis, fatigue, fever and unexpected weight change.   HENT: Negative for rhinorrhea, sinus pressure, sore throat and tinnitus.    Eyes: Negative for photophobia and visual disturbance.   Respiratory: Negative for  "cough, shortness of breath and wheezing.    Cardiovascular: Negative for chest pain and palpitations.   Gastrointestinal: Negative for abdominal pain, blood in stool, constipation, diarrhea, nausea and vomiting.   Genitourinary: Negative for dysuria, flank pain, frequency and vaginal discharge.   Musculoskeletal: Positive for arthralgias. Negative for joint swelling.   Skin: Negative for rash.   Neurological: Negative for speech difficulty, weakness, light-headedness and headaches.   Psychiatric/Behavioral: Negative for behavioral problems and dysphoric mood.       Physical Exam  Vitals:    09/09/19 1229   BP: 130/72   Pulse: (!) 112   Temp: 98.3 °F (36.8 °C)   TempSrc: Oral   SpO2: 96%   Weight: 101.2 kg (223 lb 1.7 oz)   Height: 5' 3" (1.6 m)    Body mass index is 39.52 kg/m².  Weight: 101.2 kg (223 lb 1.7 oz)   Height: 5' 3" (160 cm)     Physical Exam   Constitutional: She is oriented to person, place, and time. She appears well-developed and well-nourished. No distress.   HENT:   Head: Normocephalic and atraumatic.   Eyes: EOM are normal.   Neck: Neck supple.   Cardiovascular: Normal rate and regular rhythm. Exam reveals no gallop and no friction rub.   No murmur heard.  Pulmonary/Chest: Effort normal and breath sounds normal. No respiratory distress. She has no wheezes. She has no rales.   Lymphadenopathy:     She has no cervical adenopathy.   Neurological: She is alert and oriented to person, place, and time.   Skin: Skin is warm and dry. No rash noted.   Psychiatric: She has a normal mood and affect. Her behavior is normal.   Nursing note and vitals reviewed.      Health Maintenance       Date Due Completion Date    Pneumococcal Vaccine (Highest Risk) (1 of 3 - PCV13) 02/27/1980 ---    Shingles Vaccine (1 of 2) 02/27/2011 ---    Influenza Vaccine (1) 09/01/2019 9/13/2018 (Done)    Override on 9/13/2018: Done ([facility])    Override on 10/12/2017: Done    Override on 10/12/2016: Done    Override " on 10/29/2015: Done    Override on 10/1/2014: Done    Eye Exam 10/02/2019 10/2/2018    Override on 2/10/2016: Done    Foot Exam 10/10/2019 10/10/2018 (Done)    Override on 10/10/2018: Done    Override on 9/29/2017: Done    Mammogram 10/17/2019 10/17/2017    Hemoglobin A1c 11/16/2019 8/16/2019    Fecal Occult Blood Test (FOBT)/[employer]Kit 12/04/2019 12/4/2018    Override on 9/22/2017: Done    Lipid Panel 08/16/2020 8/16/2019    Urine Microalbumin 08/16/2020 8/16/2019    Low Dose Statin 08/20/2020 8/20/2019    TETANUS VACCINE 04/27/2027 4/27/2017 (Declined)    Override on 4/27/2017: Declined          Health maintenance reviewed and addressed as ordered      ASSESSMENT     1. Uncontrolled type 2 diabetes mellitus with hyperglycemia    2. Insulin long-term use    3. Neuropathy    4. Type 2 diabetes mellitus, uncontrolled, with neuropathy    5. Essential hypertension        PLAN:     Problem List Items Addressed This Visit        Neuro    Neuropathy  -pain should improve with better BG control    Relevant Medications    traMADol (ULTRAM) 50 mg tablet       Cardiac/Vascular    Essential hypertension (Chronic)  -she has not been getting her BB regularly which may lead to her fast HR    Relevant Medications    carvedilol (COREG) 6.25 MG tablet    losartan (COZAAR) 100 MG tablet       Endocrine    Diabetes mellitus type 2, uncontrolled - Primary (Chronic)  -improving but slowly, aim for better BG control    Insulin long-term use (Chronic)  -has f/u with endo    Type 2 diabetes mellitus, uncontrolled, with neuropathy (Chronic)  -continue for neuropathic foot pain  -[place] [facility] database queried/reviewed  -discussed checking BG after meals which she is doing now that she has freestyle meter    Relevant Medications    traMADol (ULTRAM) 50 mg tablet    nortriptyline (PAMELOR) 25 MG capsule            Rosalie Brady MD  09/09/2019 12:57 PM        Follow up in about 3 months (around 12/9/2019) for Follow up.

## 2019-10-08 NOTE — LETTER
AUTHORIZATION FOR RELEASE OF   CONFIDENTIAL INFORMATION    Dear JACKIE ORDOÑEZ,    We are seeing Michelle Donohue, date of birth 1960, in the clinic at [facility]ILY [facility]/ INTERNAL MED/ PEDS. Rosalie Brady MD is the patient's PCP. Michelle Donohue has an outstanding lab/procedure at the time we reviewed her chart. In order to help keep her health information updated, she has authorized us to request the following medical record(s):        (  )  MAMMOGRAM                                      (  )  COLONOSCOPY      (  )  PAP SMEAR                                          (  )  OUTSIDE LAB RESULTS     (  )  DEXA SCAN                                          ( X ) DIABETIC  EYE EXAM            (  )  FOOT EXAM                                          (  )  ENTIRE RECORD     (  )  OUTSIDE IMMUNIZATIONS                 (  )  _______________         Please fax records to [facility], Whitney Hardy, MD, [phone]  [address] [place] [address]. [address] [facility] [address]      If you have any questions, please contact Ela Yo MA,[facility] at [phone].             Patient Name: Michelle Donohue  : 1960  Patient Phone #: [phone]

## 2019-12-02 NOTE — TELEPHONE ENCOUNTER
Spoke with pt/ F/u appt with Patricia scheduled for 1/14/2020 @1pm and lab appt scheduled for 1/7/2020 @9am. Pt had no further questions or concerns.

## 2019-12-19 NOTE — PATIENT INSTRUCTIONS
Recommend lotions: eucerin, eucerin for diabetics, aquaphor, A&D ointment, gold bond for diabetics, sween, Mondamin's Bees all purpose baby ointment,  urea 40 with aloe (found on amazon.com)    Shoe recommendations: (try 6pm.com, zappos.com , nordstromrack.[employer], or shoes.[employer] for discounted prices) you can visit [employer]SW shoes in [place]  or IMASTE in the [facility] (there are also several shoe brand outlets in the [facility])    Asics (GT 2000 or gel foundations), new balance stability type shoes, saucony (stabil c3),  Carlos (GTS or Beast or transcend), propet (tennis shoe)    Sofft Brand or axxiom (women) Behzad&Julien (men), clarks, crocs, aerosoles, naturalizers, SAS, ecco, born, roma lopez, rockports (dress shoes)    Vionic, burkenstocks, fitflops, propet (sandals)  Nike comfort thong sandals, crocs, propet (house shoes)    Nail Home remedy:  Vicks Vapor rub to nails for easier manageability    Diabetes: Inspecting Your Feet  Diabetes increases your chances of developing foot problems. So inspect your feet every day. This helps you find small skin irritations before they become serious infections. If you have trouble seeing the bottoms of your feet, use a mirror or ask a family member or friend to help.     Pressure spots on the bottom of the foot are common areas where problems develop.   How to check your feet  Below are tips to help you look for foot problems. Try to check your feet at the same time each day, such as when you get out of bed in the morning:  · Check the top of each foot. The tops of toes, back of the heel, and outer edge of the foot can get a lot of rubbing from poor-fitting shoes.  · Check the bottom of each foot. Daily wear and tear often leads to problems at pressure spots.  · Check the toes and nails. Fungal infections often occur between toes. Toenail problems can also be a sign of fungal infections or lead to breaks in the skin.  · Check your shoes, too. Loose objects inside a shoe can  injure the foot. Use your hand to feel inside your shoes for things like eran, loose stitching, or rough areas that could irritate your skin.  Warning signs  Look for any color changes in the foot. Redness with streaks can signal a severe infection, which needs immediate medical attention. Tell your doctor right away if you have any of these problems:  · Swelling, sometimes with color changes, may be a sign of poor blood flow or infection. Symptoms include tenderness and an increase in the size of your foot.  · Warm or hot areas on your feet may be signs of infection. A foot that is cold may not be getting enough blood.  · Sensations such as burning, tingling, or pins and needles can be signs of a problem. Also check for areas that may be numb.  · Hot spots are caused by friction or pressure. Look for hot spots in areas that get a lot of rubbing. Hot spots can turn into blisters, calluses, or sores.  · Cracks and sores are caused by dry or irritated skin. They are a sign that the skin is breaking down, which can lead to infection.  · Toenail problems to watch for include nails growing into the skin (ingrown toenail) and causing redness or pain. Thick, yellow, or discolored nails can signal a fungal infection.  · Drainage and odor can develop from untreated sores and ulcers. Call your doctor right away if you notice white or yellow drainage, bleeding, or unpleasant odor.   © 7904-4934 [employer]. [address] [place] [address]. All rights reserved. This information is not intended as a substitute for professional medical care. Always follow your healthcare professional's instructions.        Step-by-Step:  Inspecting Your Feet (Diabetes)    Date Last Reviewed: 10/1/2016  © 2409-0288 [employer]. [address] [place] [address]. All rights reserved. This information is not intended as a substitute for professional medical care. Always follow your healthcare  professional's instructions.

## 2019-12-19 NOTE — PROGRESS NOTES
Subjective:      Patient ID: Michelle Donohue is a 59 y.o. female.    Chief Complaint: Diabetes Mellitus (ov 9/9/19 Dr Brady PCP); Diabetic Foot Exam; Nail Care; and Foot Problem (lump on arch of right foot)    Michelle is a 59 y.o. female who presents to the clinic for evaluation and treatment of high risk feet. Michelle has a past medical history of Asthma, Diabetes mellitus, type 2, Diabetic neuropathy, Diabetic retinopathy, Epiretinal membrane, left eye, Heart failure, Hyperlipidemia, Hypertension, Retinal detachment, Sickle cell trait, and Vitreous hemorrhage of both eyes.  The patient relates she has no major complaints on today's encounter, she is primarily here for diabetic foot exam.  She does however states that she noted a nonpainful lump to the plantar medial right foot. Unknown duration.  She denies any change in color, shape, size, drainage since she 1st noticed it.  She denies self-treatment.  This patient has documented high risk feet requiring routine maintenance secondary to diabetes mellitis and those secondary complications of diabetes, as mentioned..    PCP: Rosalie Brady MD    Date Last Seen by PCP:   Chief Complaint   Patient presents with    Diabetes Mellitus     ov 9/9/19 Dr Brady PCP    Diabetic Foot Exam    Nail Care    Foot Problem     lump on arch of right foot       Current shoe gear:   Rx diabetic extra depth shoes and custom accommodative insoles    Hemoglobin A1C   Date Value Ref Range Status   12/19/2019 8.2 (H) 4.0 - 5.6 % Final     Comment:     ADA Screening Guidelines:  5.7-6.4%  Consistent with prediabetes  >or=6.5%  Consistent with diabetes  High levels of fetal hemoglobin interfere with the HbA1C  assay. Heterozygous hemoglobin variants (HbS, HgC, etc)do  not significantly interfere with this assay.   However, presence of multiple variants may affect accuracy.     08/16/2019 9.4 (H) 4.0 - 5.6 % Final     Comment:     ADA Screening Guidelines:  5.7-6.4%  Consistent with  prediabetes  >or=6.5%  Consistent with diabetes  High levels of fetal hemoglobin interfere with the HbA1C  assay. Heterozygous hemoglobin variants (HbS, HgC, etc)do  not significantly interfere with this assay.   However, presence of multiple variants may affect accuracy.     08/16/2019 9.4 (H) 4.0 - 5.6 % Final     Comment:     ADA Screening Guidelines:  5.7-6.4%  Consistent with prediabetes  >or=6.5%  Consistent with diabetes  High levels of fetal hemoglobin interfere with the HbA1C  assay. Heterozygous hemoglobin variants (HbS, HgC, etc)do  not significantly interfere with this assay.   However, presence of multiple variants may affect accuracy.             Patient Active Problem List   Diagnosis    Type 2 diabetes mellitus, uncontrolled, with neuropathy    CHF (congestive heart failure)    Glaucoma (increased eye pressure)    Chronic obstructive airway disease with asthma    Type 2 diabetes mellitus with retinopathy without macular edema    Vitreous hemorrhage    Epiretinal membrane    Sickle cell trait    Essential hypertension    Combined hyperlipidemia associated with type 2 diabetes mellitus    Insulin long-term use    Morbid obesity with BMI of 40.0-44.9, adult    Uncontrolled type 2 diabetes mellitus with stage 3 chronic kidney disease    Traction detachment of retina    Diabetes mellitus type 2, uncontrolled    Hypokalemia    Neuropathy       Current Outpatient Medications on File Prior to Visit   Medication Sig Dispense Refill    albuterol (PROAIR HFA) 90 mcg/actuation inhaler Inhale 2 puffs into the lungs every 6 (six) hours as needed. 18 g 5    amLODIPine (NORVASC) 10 MG tablet Take 1 tablet (10 mg total) by mouth once daily. 30 tablet 5    atorvastatin (LIPITOR) 40 MG tablet Take 1 tablet (40 mg total) by mouth once daily. 30 tablet 5    canagliflozin (INVOKANA) 100 mg Tab Take 1 tablet (100 mg total) by mouth once daily. 30 tablet 5    carvedilol (COREG) 6.25 MG tablet Take 1  tablet (6.25 mg total) by mouth every evening. 30 tablet 5    cloNIDine (CATAPRES) 0.1 MG tablet TAKE 1 TABLET BY MOUTH TWICE DAILY 60 tablet 5    diabetic supplies, miscellan. Kit Please change sensor every 14 days. FreeStyle Federico Sensor 30-day supply (2 sensors/month) 2 kit 5    furosemide (LASIX) 40 MG tablet Take 1 tablet (40 mg total) by mouth 2 (two) times daily. 60 tablet 5    gabapentin (NEURONTIN) 800 MG tablet Take 1 tablet (800 mg total) by mouth 2 (two) times daily. 60 tablet 5    insulin NPH-insulin regular, 70/30, (HUMULIN 70/30 U-100 INSULIN) 100 unit/mL (70-30) injection INJECT 70 UNITS SUBCUTANEOUSLY BEFORE BREAKFAST AND 50 UNITS BEFORE SUPPER 40 mL 5    losartan (COZAAR) 100 MG tablet Take 1 tablet (100 mg total) by mouth once daily. 30 tablet 5    nortriptyline (PAMELOR) 25 MG capsule Take 1 capsule (25 mg total) by mouth every evening. 30 capsule 5    OZEMPIC 1 mg/dose (2 mg/1.5 mL) PnIj INJECT 1 MG SUBCUTANEOUSLY ONCE A WEEK 2 Syringe 1    pantoprazole (PROTONIX) 20 MG tablet Take 1 tablet (20 mg total) by mouth once daily. 30 tablet 11    potassium chloride SA (K-DUR,KLOR-CON) 20 MEQ tablet TAKE 1 TABLET BY MOUTH TWICE DAILY 180 tablet 3    traMADol (ULTRAM) 50 mg tablet Take 1 tablet (50 mg total) by mouth every 12 (twelve) hours as needed. 60 tablet 2     Current Facility-Administered Medications on File Prior to Visit   Medication Dose Route Frequency Provider Last Rate Last Dose    albuterol nebulizer solution 2.5 mg  2.5 mg Nebulization 1 time in Clinic/HOD WVandana Woo MD           Review of patient's allergies indicates:   Allergen Reactions    Lisinopril Other (See Comments)     cough       Past Surgical History:   Procedure Laterality Date    Avastin Injection Bilateral     BREAST BIOPSY      age 16    BREAST LUMPECTOMY      right breast    BTL      CATARACT EXTRACTION W/  INTRAOCULAR LENS IMPLANT Left 3/25/2015    OS (Dr. Montes)    CATARACT EXTRACTION  W/  INTRAOCULAR LENS IMPLANT Right 6/17/15    OD ()    PANRETINAL PHOTOCOAGULATION      Both eyes       Family History   Problem Relation Age of Onset    Heart failure Father 60            Hyperlipidemia Father 60            Hypertension Father 60            Heart attack Father     Diabetes Mother     Hypertension Mother     Heart failure Mother     Diabetes Brother     Heart failure Brother     Hyperlipidemia Brother     Hypertension Brother     Heart failure Sister        Social History     Socioeconomic History    Marital status: Single     Spouse name: Not on file    Number of children: 2    Years of education: Not on file    Highest education level: Not on file   Occupational History    Occupation:    Social Needs    Financial resource strain: Not on file    Food insecurity:     Worry: Not on file     Inability: Not on file    Transportation needs:     Medical: Not on file     Non-medical: Not on file   Tobacco Use    Smoking status: Never Smoker    Smokeless tobacco: Never Used   Substance and Sexual Activity    Alcohol use: No     Alcohol/week: 0.0 standard drinks    Drug use: No    Sexual activity: Yes     Partners: Male     Comment: . 2 children. works as a .   Lifestyle    Physical activity:     Days per week: Not on file     Minutes per session: Not on file    Stress: Not on file   Relationships    Social connections:     Talks on phone: Not on file     Gets together: Not on file     Attends Orthodoxy service: Not on file     Active member of club or organization: Not on file     Attends meetings of clubs or organizations: Not on file     Relationship status: Not on file   Other Topics Concern    Not on file   Social History Narrative    Not on file       Review of Systems   Constitution: Negative for chills and fever.   Cardiovascular: Positive for leg swelling. Negative for claudication.   Respiratory:  "Negative for cough and shortness of breath.    Skin: Positive for dry skin and nail changes. Negative for itching and rash.   Musculoskeletal: Positive for joint pain and myalgias. Negative for falls, joint swelling and muscle weakness.   Gastrointestinal: Negative for diarrhea, nausea and vomiting.   Neurological: Positive for numbness and paresthesias. Negative for tremors and weakness.   Psychiatric/Behavioral: Negative for altered mental status and hallucinations.           Objective:      Vitals:    12/19/19 1154   BP: 130/76   Weight: 101.2 kg (223 lb 1.7 oz)   Height: 5' 3" (1.6 m)   PainSc: 0-No pain       Physical Exam   Constitutional:   General: Pt. is well-developed, well-nourished, appears stated age, in no acute distress, alert and oriented x 3. No evidence of depression, anxiety, or agitation. Calm, cooperative, and communicative. Appropriate interactions and affect.       Cardiovascular:   Pulses:       Dorsalis pedis pulses are 1+ on the right side, and 1+ on the left side.        Posterior tibial pulses are 1+ on the right side, and 1+ on the left side.   Dorsalis pedis and posterior tibial pulses are diminished Bilaterally. Toes are cool to touch. Feet are warm proximally.There is decreased digital hair.   Musculoskeletal:        Right ankle: She exhibits normal range of motion and no swelling. No tenderness. Achilles tendon exhibits no pain and no defect.        Left ankle: She exhibits normal range of motion and no swelling. No tenderness. Achilles tendon exhibits no pain and no defect.        Right foot: There is normal range of motion and no tenderness.        Left foot: There is normal range of motion and no tenderness.   There is equinus deformity bilateral with decreased dorsiflexion at the ankle joint bilateral.     Decreased first MPJ range of motion both weightbearing and nonweightbearing, no crepitus observed the first MP joint, + dorsal flag sign. Mild  bunion deformity is observed " .    Patient has hammertoes of digits 2-5 bilateral partially reducible     Fat pad atrophy to heels and met heads bilateral    There is a bilateral anterior cavus foot deformity that is rigid with minimal compensation. Bilateral MTJ is is everted to the rear foot. With first metatarsal with foot loaded having and dorsal and plantar excursion of 5mm dorsiflexion and  5mm plantarflexion. Gait analysis reveals an early heel off with the fore foot bilateral striking longer in midstance. Patient shoes demonstrated medial heel counter wear bilateral.     Firm nonmobile mass to medial band of plantar fascia of the right foot        Neurological: A sensory deficit is present.   Saline-Carrington 5.07 monofilamant testing is diminished Homero feet. Sharp/dull sensation diminished Bilaterally.    Skin: Skin is warm, dry and intact. No abrasion, no ecchymosis, no lesion and no rash noted. She is not diaphoretic. No cyanosis or erythema. No pallor. Nails show no clubbing.   Nails neatly trimmed    Interdigital Spaces clean, dry and without evidence of break in skin integrity   Psychiatric: She has a normal mood and affect. Her speech is normal.   Nursing note and vitals reviewed.            Assessment:       Encounter Diagnoses   Name Primary?    Type II diabetes mellitus with neurological manifestations Yes    Decreased pedal pulses     Hallux limitus, acquired, left     Hallux limitus, acquired, right     Hammer toes of both feet     Plantar fascial fibromatosis of right foot     Acquired cavus foot deformity          Plan:     Problem List Items Addressed This Visit     None      Visit Diagnoses     Type II diabetes mellitus with neurological manifestations    -  Primary    Relevant Orders    DIABETIC SHOES FOR HOME USE    CV Ankle Brachial Indices WO Stress W Toe Tracings    Decreased pedal pulses        Relevant Orders    DIABETIC SHOES FOR HOME USE    CV Ankle Brachial Indices WO Stress W Toe Tracings    Hallux  limitus, acquired, left        Relevant Orders    DIABETIC SHOES FOR HOME USE    CV Ankle Brachial Indices WO Stress W Toe Tracings    Hallux limitus, acquired, right        Relevant Orders    DIABETIC SHOES FOR HOME USE    CV Ankle Brachial Indices WO Stress W Toe Tracings    Hammer toes of both feet        Relevant Orders    DIABETIC SHOES FOR HOME USE    CV Ankle Brachial Indices WO Stress W Toe Tracings    Plantar fascial fibromatosis of right foot        Relevant Orders    DIABETIC SHOES FOR HOME USE    CV Ankle Brachial Indices WO Stress W Toe Tracings    Acquired cavus foot deformity        Relevant Orders    DIABETIC SHOES FOR HOME USE    CV Ankle Brachial Indices WO Stress W Toe Tracings         I counseled the patient on her conditions, their implications and medical management.    Orders Placed This Encounter    DIABETIC SHOES FOR HOME USE    CV Ankle Brachial Indices WO Stress W Toe Tracings       Greater than 50% of this visit spent on counseling and coordination of care.    Education about the diabetic foot, neuropathy, and prevention of limb loss.    Shoe inspection. Diabetic Foot Education. Patient reminded of the importance of good nutrition/healthy diet/weight management and blood sugar control to help prevent podiatric complications of diabetes. Patient instructed on proper foot hygeine. Wear comfortable, proper fitting shoes. Wash feet daily. Dry well. After drying, apply moisturizer to feet (no lotion to webspaces). Inspect feet daily for skin breaks, blisters, swelling, or redness. Wear cotton socks (preferably white)  Change socks every day. Do NOT walk barefoot. Do NOT use heating pads or hot water soaks. We discussed wearing proper shoe gear, daily foot inspections, never walking without protective shoe gear.     Arterial studies ordered to evaluate decreased pedal pulses.    Discussed different treatment options for fibroma. Discussed condition and treatment options with pt, stretching,  exercise, injections, proper supportive shoegear and inserts. Surgical intervention was touched on, but not discussed in depth at this time.  Assured patient that the continued presence of the mass generally does not pose a risk, and it can remain in the foot as long as there are no symptoms. Patient alerted to watch for sudden extreme size changes, openings/tracts/sinuses, drainage, color change, pain.    Discussed edema control and the importance of daily moisturizer to the feet such as Gold bonds diabetic foot cream    Recommend applying vicks vaporub to thick abnormal toenails daily x 6 months to treat fungal nail infection.    Rx diabetic shoes for protection and support    She will continue to monitor the areas daily, inspect her feet, wear protective shoe gear when ambulatory, moisturizer to maintain skin integrity and follow in this office in approximately 4-6 months, sooner p.r.n.

## 2019-12-31 NOTE — TELEPHONE ENCOUNTER
Pt notified of Dr. Brady's instructions below. Pt verbalized understanding. OV scheduled for 1/29/20.

## 2020-01-01 ENCOUNTER — OFFICE VISIT (OUTPATIENT)
Dept: ENDOCRINOLOGY | Facility: CLINIC | Age: 60
End: 2020-01-01
Payer: COMMERCIAL

## 2020-01-01 ENCOUNTER — LAB VISIT (OUTPATIENT)
Dept: LAB | Facility: HOSPITAL | Age: 60
End: 2020-01-01
Attending: FAMILY MEDICINE
Payer: COMMERCIAL

## 2020-01-01 ENCOUNTER — TELEPHONE (OUTPATIENT)
Dept: ENDOCRINOLOGY | Facility: CLINIC | Age: 60
End: 2020-01-01

## 2020-01-01 ENCOUNTER — ANESTHESIA (OUTPATIENT)
Dept: INTENSIVE CARE | Facility: HOSPITAL | Age: 60
End: 2020-01-01

## 2020-01-01 ENCOUNTER — NURSE TRIAGE (OUTPATIENT)
Dept: ADMINISTRATIVE | Facility: CLINIC | Age: 60
End: 2020-01-01

## 2020-01-01 ENCOUNTER — TELEPHONE (OUTPATIENT)
Dept: FAMILY MEDICINE | Facility: CLINIC | Age: 60
End: 2020-01-01

## 2020-01-01 ENCOUNTER — PATIENT MESSAGE (OUTPATIENT)
Dept: FAMILY MEDICINE | Facility: CLINIC | Age: 60
End: 2020-01-01

## 2020-01-01 ENCOUNTER — ANESTHESIA EVENT (OUTPATIENT)
Dept: INTENSIVE CARE | Facility: HOSPITAL | Age: 60
End: 2020-01-01

## 2020-01-01 ENCOUNTER — PATIENT OUTREACH (OUTPATIENT)
Dept: ADMINISTRATIVE | Facility: OTHER | Age: 60
End: 2020-01-01

## 2020-01-01 ENCOUNTER — HOSPITAL ENCOUNTER (INPATIENT)
Facility: HOSPITAL | Age: 60
LOS: 1 days | DRG: 208 | End: 2020-04-08
Attending: EMERGENCY MEDICINE | Admitting: EMERGENCY MEDICINE
Payer: COMMERCIAL

## 2020-01-01 ENCOUNTER — PATIENT OUTREACH (OUTPATIENT)
Dept: ADMINISTRATIVE | Facility: HOSPITAL | Age: 60
End: 2020-01-01

## 2020-01-01 ENCOUNTER — ANESTHESIA EVENT (OUTPATIENT)
Dept: INTENSIVE CARE | Facility: HOSPITAL | Age: 60
DRG: 208 | End: 2020-01-01
Payer: COMMERCIAL

## 2020-01-01 ENCOUNTER — OFFICE VISIT (OUTPATIENT)
Dept: FAMILY MEDICINE | Facility: CLINIC | Age: 60
End: 2020-01-01
Payer: COMMERCIAL

## 2020-01-01 ENCOUNTER — ANESTHESIA (OUTPATIENT)
Dept: INTENSIVE CARE | Facility: HOSPITAL | Age: 60
DRG: 208 | End: 2020-01-01
Payer: COMMERCIAL

## 2020-01-01 VITALS
BODY MASS INDEX: 39.15 KG/M2 | WEIGHT: 221 LBS | DIASTOLIC BLOOD PRESSURE: 87 MMHG | SYSTOLIC BLOOD PRESSURE: 155 MMHG | HEART RATE: 110 BPM

## 2020-01-01 VITALS
OXYGEN SATURATION: 98 % | BODY MASS INDEX: 39.87 KG/M2 | HEIGHT: 63 IN | WEIGHT: 225 LBS | HEART RATE: 113 BPM | DIASTOLIC BLOOD PRESSURE: 76 MMHG | SYSTOLIC BLOOD PRESSURE: 134 MMHG | TEMPERATURE: 99 F

## 2020-01-01 DIAGNOSIS — Z79.4 TYPE 2 DIABETES MELLITUS WITH RETINOPATHY WITHOUT MACULAR EDEMA, WITH LONG-TERM CURRENT USE OF INSULIN, UNSPECIFIED LATERALITY, UNSPECIFIED RETINOPATHY SEVERITY: Primary | Chronic | ICD-10-CM

## 2020-01-01 DIAGNOSIS — R06.03 ACUTE RESPIRATORY DISTRESS: Primary | ICD-10-CM

## 2020-01-01 DIAGNOSIS — E11.9 TYPE 2 DIABETES MELLITUS WITHOUT COMPLICATION: ICD-10-CM

## 2020-01-01 DIAGNOSIS — E66.01 SEVERE OBESITY: ICD-10-CM

## 2020-01-01 DIAGNOSIS — Z12.11 SCREEN FOR COLON CANCER: Primary | ICD-10-CM

## 2020-01-01 DIAGNOSIS — G62.9 NEUROPATHY: ICD-10-CM

## 2020-01-01 DIAGNOSIS — U07.1 COVID-19 VIRUS INFECTION: ICD-10-CM

## 2020-01-01 DIAGNOSIS — E13.319 RETINOPATHY DUE TO SECONDARY DIABETES: ICD-10-CM

## 2020-01-01 DIAGNOSIS — I10 ESSENTIAL HYPERTENSION: Chronic | ICD-10-CM

## 2020-01-01 DIAGNOSIS — I73.9 PERIPHERAL ARTERY DISEASE: Primary | ICD-10-CM

## 2020-01-01 DIAGNOSIS — I10 ESSENTIAL HYPERTENSION: ICD-10-CM

## 2020-01-01 DIAGNOSIS — R10.9 ABDOMINAL PAIN, UNSPECIFIED ABDOMINAL LOCATION: ICD-10-CM

## 2020-01-01 DIAGNOSIS — R09.02 HYPOXIA: ICD-10-CM

## 2020-01-01 DIAGNOSIS — E11.319 TYPE 2 DIABETES MELLITUS WITH RETINOPATHY WITHOUT MACULAR EDEMA, WITH LONG-TERM CURRENT USE OF INSULIN, UNSPECIFIED LATERALITY, UNSPECIFIED RETINOPATHY SEVERITY: Primary | Chronic | ICD-10-CM

## 2020-01-01 DIAGNOSIS — I50.9 CONGESTIVE HEART FAILURE, UNSPECIFIED HF CHRONICITY, UNSPECIFIED HEART FAILURE TYPE: ICD-10-CM

## 2020-01-01 DIAGNOSIS — Z12.11 SCREEN FOR COLON CANCER: ICD-10-CM

## 2020-01-01 LAB
ALBUMIN SERPL BCP-MCNC: 3.4 G/DL (ref 3.5–5.2)
ALLENS TEST: ABNORMAL
ALP SERPL-CCNC: 74 U/L (ref 55–135)
ALT SERPL W/O P-5'-P-CCNC: 29 U/L (ref 10–44)
ANION GAP SERPL CALC-SCNC: 12 MMOL/L (ref 8–16)
ANION GAP SERPL CALC-SCNC: 18 MMOL/L (ref 8–16)
AST SERPL-CCNC: 51 U/L (ref 10–40)
BASOPHILS # BLD AUTO: 0 K/UL (ref 0–0.2)
BASOPHILS NFR BLD: 0 % (ref 0–1.9)
BILIRUB SERPL-MCNC: 0.2 MG/DL (ref 0.1–1)
BNP SERPL-MCNC: 23 PG/ML (ref 0–99)
BUN SERPL-MCNC: 14 MG/DL (ref 6–20)
BUN SERPL-MCNC: 14 MG/DL (ref 6–20)
CALCIUM SERPL-MCNC: 7.5 MG/DL (ref 8.7–10.5)
CALCIUM SERPL-MCNC: 8.4 MG/DL (ref 8.7–10.5)
CHLORIDE SERPL-SCNC: 101 MMOL/L (ref 95–110)
CHLORIDE SERPL-SCNC: 98 MMOL/L (ref 95–110)
CK SERPL-CCNC: 119 U/L (ref 20–180)
CO2 SERPL-SCNC: 17 MMOL/L (ref 23–29)
CO2 SERPL-SCNC: 22 MMOL/L (ref 23–29)
CREAT SERPL-MCNC: 1.3 MG/DL (ref 0.5–1.4)
CREAT SERPL-MCNC: 1.7 MG/DL (ref 0.5–1.4)
CRP SERPL-MCNC: 97.1 MG/L (ref 0–8.2)
CTP QC/QA: YES
DELSYS: ABNORMAL
DIFFERENTIAL METHOD: ABNORMAL
EOSINOPHIL # BLD AUTO: 0 K/UL (ref 0–0.5)
EOSINOPHIL NFR BLD: 0.6 % (ref 0–8)
ERYTHROCYTE [DISTWIDTH] IN BLOOD BY AUTOMATED COUNT: 14.8 % (ref 11.5–14.5)
ERYTHROCYTE [SEDIMENTATION RATE] IN BLOOD BY WESTERGREN METHOD: 22 MM/H
ERYTHROCYTE [SEDIMENTATION RATE] IN BLOOD BY WESTERGREN METHOD: 50 MM/HR (ref 0–20)
EST. GFR  (AFRICAN AMERICAN): 38 ML/MIN/1.73 M^2
EST. GFR  (AFRICAN AMERICAN): 52 ML/MIN/1.73 M^2
EST. GFR  (NON AFRICAN AMERICAN): 33 ML/MIN/1.73 M^2
EST. GFR  (NON AFRICAN AMERICAN): 45 ML/MIN/1.73 M^2
FERRITIN SERPL-MCNC: 987 NG/ML (ref 20–300)
GLUCOSE SERPL-MCNC: 121 MG/DL (ref 70–110)
GLUCOSE SERPL-MCNC: 307 MG/DL (ref 70–110)
HCO3 UR-SCNC: 16 MMOL/L (ref 24–28)
HCT VFR BLD AUTO: 40.8 % (ref 37–48.5)
HEMOCCULT STL QL IA: NEGATIVE
HGB BLD-MCNC: 13 G/DL (ref 12–16)
IMM GRANULOCYTES # BLD AUTO: 0.01 K/UL (ref 0–0.04)
IMM GRANULOCYTES NFR BLD AUTO: 0.2 % (ref 0–0.5)
LACTATE SERPL-SCNC: 1.7 MMOL/L (ref 0.5–2.2)
LDH SERPL L TO P-CCNC: 673 U/L (ref 110–260)
LYMPHOCYTES # BLD AUTO: 0.5 K/UL (ref 1–4.8)
LYMPHOCYTES NFR BLD: 10.8 % (ref 18–48)
MCH RBC QN AUTO: 23.6 PG (ref 27–31)
MCHC RBC AUTO-ENTMCNC: 31.9 G/DL (ref 32–36)
MCV RBC AUTO: 74 FL (ref 82–98)
MODE: ABNORMAL
MONOCYTES # BLD AUTO: 0.2 K/UL (ref 0.3–1)
MONOCYTES NFR BLD: 4 % (ref 4–15)
NEUTROPHILS # BLD AUTO: 4.1 K/UL (ref 1.8–7.7)
NEUTROPHILS NFR BLD: 84.4 % (ref 38–73)
NRBC BLD-RTO: 0 /100 WBC
PCO2 BLDA: 54.3 MMHG (ref 35–45)
PEEP: 16
PH SMN: 7.08 [PH] (ref 7.35–7.45)
PLATELET # BLD AUTO: 297 K/UL (ref 150–350)
PMV BLD AUTO: 9.8 FL (ref 9.2–12.9)
PO2 BLDA: 46 MMHG (ref 80–100)
POC BE: -14 MMOL/L
POC MOLECULAR INFLUENZA A AGN: NEGATIVE
POC MOLECULAR INFLUENZA B AGN: NEGATIVE
POC SATURATED O2: 63 % (ref 95–100)
POC TCO2: 18 MMOL/L (ref 23–27)
POTASSIUM SERPL-SCNC: 4.4 MMOL/L (ref 3.5–5.1)
POTASSIUM SERPL-SCNC: 4.7 MMOL/L (ref 3.5–5.1)
PROCALCITONIN SERPL IA-MCNC: 0.08 NG/ML
PROCALCITONIN SERPL IA-MCNC: 0.08 NG/ML
PROT SERPL-MCNC: 8.4 G/DL (ref 6–8.4)
RBC # BLD AUTO: 5.5 M/UL (ref 4–5.4)
SAMPLE: ABNORMAL
SARS-COV-2 RDRP RESP QL NAA+PROBE: POSITIVE
SITE: ABNORMAL
SODIUM SERPL-SCNC: 133 MMOL/L (ref 136–145)
SODIUM SERPL-SCNC: 135 MMOL/L (ref 136–145)
TROPONIN I SERPL DL<=0.01 NG/ML-MCNC: 0.03 NG/ML (ref 0–0.03)
VT: 500
WBC # BLD AUTO: 4.8 K/UL (ref 3.9–12.7)

## 2020-01-01 PROCEDURE — C1751 CATH, INF, PER/CENT/MIDLINE: HCPCS

## 2020-01-01 PROCEDURE — 63600175 PHARM REV CODE 636 W HCPCS: Performed by: HOSPITALIST

## 2020-01-01 PROCEDURE — 25000003 PHARM REV CODE 250: Performed by: ANESTHESIOLOGY

## 2020-01-01 PROCEDURE — 83605 ASSAY OF LACTIC ACID: CPT

## 2020-01-01 PROCEDURE — 99214 OFFICE O/P EST MOD 30 MIN: CPT | Mod: S$GLB,,, | Performed by: NURSE PRACTITIONER

## 2020-01-01 PROCEDURE — 99214 OFFICE O/P EST MOD 30 MIN: CPT | Mod: S$GLB,,, | Performed by: FAMILY MEDICINE

## 2020-01-01 PROCEDURE — 99999 PR PBB SHADOW E&M-EST. PATIENT-LVL V: ICD-10-PCS | Mod: PBBFAC,,, | Performed by: NURSE PRACTITIONER

## 2020-01-01 PROCEDURE — 99999 PR PBB SHADOW E&M-EST. PATIENT-LVL III: CPT | Mod: PBBFAC,,, | Performed by: FAMILY MEDICINE

## 2020-01-01 PROCEDURE — 63600175 PHARM REV CODE 636 W HCPCS: Performed by: ANESTHESIOLOGY

## 2020-01-01 PROCEDURE — 99292 CRITICAL CARE ADDL 30 MIN: CPT | Mod: ,,, | Performed by: INTERNAL MEDICINE

## 2020-01-01 PROCEDURE — 99999 PR PBB SHADOW E&M-EST. PATIENT-LVL III: ICD-10-PCS | Mod: PBBFAC,,, | Performed by: FAMILY MEDICINE

## 2020-01-01 PROCEDURE — 82550 ASSAY OF CK (CPK): CPT

## 2020-01-01 PROCEDURE — 63600175 PHARM REV CODE 636 W HCPCS: Performed by: FAMILY MEDICINE

## 2020-01-01 PROCEDURE — 99291 CRITICAL CARE FIRST HOUR: CPT | Mod: ,,, | Performed by: INTERNAL MEDICINE

## 2020-01-01 PROCEDURE — 83036 HEMOGLOBIN GLYCOSYLATED A1C: CPT

## 2020-01-01 PROCEDURE — 83880 ASSAY OF NATRIURETIC PEPTIDE: CPT

## 2020-01-01 PROCEDURE — 99214 PR OFFICE/OUTPT VISIT, EST, LEVL IV, 30-39 MIN: ICD-10-PCS | Mod: S$GLB,,, | Performed by: FAMILY MEDICINE

## 2020-01-01 PROCEDURE — 27000190 HC CPAP FULL FACE MASK W/VALVE

## 2020-01-01 PROCEDURE — 86140 C-REACTIVE PROTEIN: CPT

## 2020-01-01 PROCEDURE — 93010 EKG 12-LEAD: ICD-10-PCS | Mod: ,,, | Performed by: INTERNAL MEDICINE

## 2020-01-01 PROCEDURE — 99291 CRITICAL CARE FIRST HOUR: CPT

## 2020-01-01 PROCEDURE — 63600175 PHARM REV CODE 636 W HCPCS: Performed by: EMERGENCY MEDICINE

## 2020-01-01 PROCEDURE — 93005 ELECTROCARDIOGRAM TRACING: CPT

## 2020-01-01 PROCEDURE — 63600175 PHARM REV CODE 636 W HCPCS: Performed by: INTERNAL MEDICINE

## 2020-01-01 PROCEDURE — 82274 ASSAY TEST FOR BLOOD FECAL: CPT

## 2020-01-01 PROCEDURE — 99214 PR OFFICE/OUTPT VISIT, EST, LEVL IV, 30-39 MIN: ICD-10-PCS | Mod: S$GLB,,, | Performed by: NURSE PRACTITIONER

## 2020-01-01 PROCEDURE — 80053 COMPREHEN METABOLIC PANEL: CPT

## 2020-01-01 PROCEDURE — 20000000 HC ICU ROOM

## 2020-01-01 PROCEDURE — 83615 LACTATE (LD) (LDH) ENZYME: CPT

## 2020-01-01 PROCEDURE — 84484 ASSAY OF TROPONIN QUANT: CPT

## 2020-01-01 PROCEDURE — 93010 ELECTROCARDIOGRAM REPORT: CPT | Mod: ,,, | Performed by: INTERNAL MEDICINE

## 2020-01-01 PROCEDURE — 25000003 PHARM REV CODE 250: Performed by: EMERGENCY MEDICINE

## 2020-01-01 PROCEDURE — 36573 INSJ PICC RS&I 5 YR+: CPT

## 2020-01-01 PROCEDURE — 99999 PR PBB SHADOW E&M-EST. PATIENT-LVL V: CPT | Mod: PBBFAC,,, | Performed by: NURSE PRACTITIONER

## 2020-01-01 PROCEDURE — 87040 BLOOD CULTURE FOR BACTERIA: CPT

## 2020-01-01 PROCEDURE — 99291 PR CRITICAL CARE, E/M 30-74 MINUTES: ICD-10-PCS | Mod: ,,, | Performed by: INTERNAL MEDICINE

## 2020-01-01 PROCEDURE — 82728 ASSAY OF FERRITIN: CPT

## 2020-01-01 PROCEDURE — U0002 COVID-19 LAB TEST NON-CDC: HCPCS

## 2020-01-01 PROCEDURE — 99292 PR CRITICAL CARE, ADDL 30 MIN: ICD-10-PCS | Mod: ,,, | Performed by: INTERNAL MEDICINE

## 2020-01-01 PROCEDURE — 36415 COLL VENOUS BLD VENIPUNCTURE: CPT

## 2020-01-01 PROCEDURE — 85025 COMPLETE CBC W/AUTO DIFF WBC: CPT

## 2020-01-01 PROCEDURE — 94660 CPAP INITIATION&MGMT: CPT

## 2020-01-01 PROCEDURE — 85652 RBC SED RATE AUTOMATED: CPT

## 2020-01-01 PROCEDURE — 94761 N-INVAS EAR/PLS OXIMETRY MLT: CPT

## 2020-01-01 PROCEDURE — 84145 PROCALCITONIN (PCT): CPT

## 2020-01-01 PROCEDURE — 80048 BASIC METABOLIC PNL TOTAL CA: CPT

## 2020-01-01 PROCEDURE — 36569 INSJ PICC 5 YR+ W/O IMAGING: CPT

## 2020-01-01 PROCEDURE — 99900035 HC TECH TIME PER 15 MIN (STAT)

## 2020-01-01 PROCEDURE — 87502 INFLUENZA DNA AMP PROBE: CPT

## 2020-01-01 RX ORDER — SODIUM CHLORIDE 0.9 % (FLUSH) 0.9 %
10 SYRINGE (ML) INJECTION
Status: DISCONTINUED | OUTPATIENT
Start: 2020-01-01 | End: 2020-01-01

## 2020-01-01 RX ORDER — PROPOFOL 10 MG/ML
5 INJECTION, EMULSION INTRAVENOUS
Status: DISCONTINUED | OUTPATIENT
Start: 2020-01-01 | End: 2020-01-01

## 2020-01-01 RX ORDER — CARVEDILOL 6.25 MG/1
TABLET ORAL
Qty: 30 TABLET | Refills: 0 | Status: SHIPPED | OUTPATIENT
Start: 2020-01-01 | End: 2020-01-01

## 2020-01-01 RX ORDER — GABAPENTIN 800 MG/1
TABLET ORAL
Qty: 60 TABLET | Refills: 5 | Status: SHIPPED | OUTPATIENT
Start: 2020-01-01

## 2020-01-01 RX ORDER — INSULIN LISPRO 100 [IU]/ML
20 INJECTION, SOLUTION INTRAVENOUS; SUBCUTANEOUS
Qty: 2 BOX | Refills: 1 | Status: SHIPPED | OUTPATIENT
Start: 2020-01-01 | End: 2021-01-23

## 2020-01-01 RX ORDER — DEXMEDETOMIDINE HYDROCHLORIDE 4 UG/ML
0.2 INJECTION, SOLUTION INTRAVENOUS CONTINUOUS
Status: DISCONTINUED | OUTPATIENT
Start: 2020-01-01 | End: 2020-01-01

## 2020-01-01 RX ORDER — NORTRIPTYLINE HYDROCHLORIDE 25 MG/1
CAPSULE ORAL
Qty: 30 CAPSULE | Refills: 5 | Status: SHIPPED | OUTPATIENT
Start: 2020-01-01

## 2020-01-01 RX ORDER — FLASH GLUCOSE SENSOR
KIT MISCELLANEOUS
Qty: 2 KIT | Refills: 11 | Status: SHIPPED | OUTPATIENT
Start: 2020-01-01

## 2020-01-01 RX ORDER — ONDANSETRON 2 MG/ML
4 INJECTION INTRAMUSCULAR; INTRAVENOUS EVERY 6 HOURS PRN
Status: DISCONTINUED | OUTPATIENT
Start: 2020-04-09 | End: 2020-04-09 | Stop reason: HOSPADM

## 2020-01-01 RX ORDER — IBUPROFEN 200 MG
24 TABLET ORAL
Status: DISCONTINUED | OUTPATIENT
Start: 2020-01-01 | End: 2020-01-01

## 2020-01-01 RX ORDER — INSULIN ASPART 100 [IU]/ML
20 INJECTION, SOLUTION INTRAVENOUS; SUBCUTANEOUS
Qty: 2 BOX | Refills: 1 | Status: SHIPPED | OUTPATIENT
Start: 2020-01-01 | End: 2020-01-01 | Stop reason: ALTCHOICE

## 2020-01-01 RX ORDER — FLASH GLUCOSE SENSOR
KIT MISCELLANEOUS
COMMUNITY
Start: 2020-01-01 | End: 2020-01-01 | Stop reason: SDUPTHER

## 2020-01-01 RX ORDER — INSULIN DEGLUDEC 200 U/ML
60 INJECTION, SOLUTION SUBCUTANEOUS DAILY
Qty: 3 SYRINGE | Refills: 1 | Status: SHIPPED | OUTPATIENT
Start: 2020-01-01 | End: 2020-01-01 | Stop reason: ALTCHOICE

## 2020-01-01 RX ORDER — INSULIN DEGLUDEC 200 U/ML
60 INJECTION, SOLUTION SUBCUTANEOUS DAILY
Qty: 3 SYRINGE | Refills: 1 | Status: SHIPPED | OUTPATIENT
Start: 2020-01-01 | End: 2020-01-01

## 2020-01-01 RX ORDER — INSULIN GLARGINE 300 [IU]/ML
45 INJECTION, SOLUTION SUBCUTANEOUS DAILY
Qty: 2 SYRINGE | Refills: 3
Start: 2020-01-01

## 2020-01-01 RX ORDER — SODIUM BICARBONATE 1 MEQ/ML
SYRINGE (ML) INTRAVENOUS CODE/TRAUMA/SEDATION MEDICATION
Status: COMPLETED | OUTPATIENT
Start: 2020-01-01 | End: 2020-01-01

## 2020-01-01 RX ORDER — FUROSEMIDE 40 MG/1
40 TABLET ORAL 2 TIMES DAILY
Qty: 60 TABLET | Refills: 5 | Status: SHIPPED | OUTPATIENT
Start: 2020-01-01

## 2020-01-01 RX ORDER — TRAMADOL HYDROCHLORIDE 50 MG/1
TABLET ORAL
Qty: 60 TABLET | Refills: 2 | Status: SHIPPED | OUTPATIENT
Start: 2020-01-01

## 2020-01-01 RX ORDER — IBUPROFEN 200 MG
16 TABLET ORAL
Status: DISCONTINUED | OUTPATIENT
Start: 2020-01-01 | End: 2020-01-01

## 2020-01-01 RX ORDER — SCOLOPAMINE TRANSDERMAL SYSTEM 1 MG/1
1 PATCH, EXTENDED RELEASE TRANSDERMAL
Status: DISCONTINUED | OUTPATIENT
Start: 2020-04-09 | End: 2020-04-09 | Stop reason: HOSPADM

## 2020-01-01 RX ORDER — INSULIN ASPART 100 [IU]/ML
1-10 INJECTION, SOLUTION INTRAVENOUS; SUBCUTANEOUS
Status: DISCONTINUED | OUTPATIENT
Start: 2020-01-01 | End: 2020-01-01

## 2020-01-01 RX ORDER — INSULIN GLARGINE 300 [IU]/ML
60 INJECTION, SOLUTION SUBCUTANEOUS DAILY
Qty: 2 SYRINGE | Refills: 3 | Status: SHIPPED | OUTPATIENT
Start: 2020-01-01 | End: 2020-01-01

## 2020-01-01 RX ORDER — MORPHINE SULFATE 10 MG/ML
4 INJECTION INTRAMUSCULAR; INTRAVENOUS; SUBCUTANEOUS EVERY 4 HOURS PRN
Status: DISCONTINUED | OUTPATIENT
Start: 2020-04-09 | End: 2020-04-09 | Stop reason: HOSPADM

## 2020-01-01 RX ORDER — FENTANYL CITRATE-0.9 % NACL/PF 10 MCG/ML
PLASTIC BAG, INJECTION (ML) INTRAVENOUS CONTINUOUS
Status: DISCONTINUED | OUTPATIENT
Start: 2020-01-01 | End: 2020-01-01

## 2020-01-01 RX ORDER — HYDROXYCHLOROQUINE SULFATE 200 MG/1
400 TABLET, FILM COATED ORAL 2 TIMES DAILY
Status: DISCONTINUED | OUTPATIENT
Start: 2020-01-01 | End: 2020-01-01

## 2020-01-01 RX ORDER — CARVEDILOL 6.25 MG/1
TABLET ORAL
Qty: 30 TABLET | Refills: 5 | Status: SHIPPED | OUTPATIENT
Start: 2020-01-01

## 2020-01-01 RX ORDER — ENOXAPARIN SODIUM 100 MG/ML
40 INJECTION SUBCUTANEOUS EVERY 24 HOURS
Status: DISCONTINUED | OUTPATIENT
Start: 2020-01-01 | End: 2020-01-01

## 2020-01-01 RX ORDER — FUROSEMIDE 10 MG/ML
40 INJECTION INTRAMUSCULAR; INTRAVENOUS ONCE
Status: COMPLETED | OUTPATIENT
Start: 2020-01-01 | End: 2020-01-01

## 2020-01-01 RX ORDER — TRAMADOL HYDROCHLORIDE 50 MG/1
50 TABLET ORAL EVERY 12 HOURS PRN
Qty: 60 TABLET | Refills: 0 | Status: SHIPPED | OUTPATIENT
Start: 2020-01-01 | End: 2020-01-01

## 2020-01-01 RX ORDER — DEXAMETHASONE SODIUM PHOSPHATE 4 MG/ML
10 INJECTION, SOLUTION INTRA-ARTICULAR; INTRALESIONAL; INTRAMUSCULAR; INTRAVENOUS; SOFT TISSUE DAILY
Status: DISCONTINUED | OUTPATIENT
Start: 2020-01-01 | End: 2020-01-01

## 2020-01-01 RX ORDER — LORAZEPAM 2 MG/ML
1 INJECTION INTRAMUSCULAR EVERY 30 MIN PRN
Status: DISCONTINUED | OUTPATIENT
Start: 2020-04-09 | End: 2020-04-09 | Stop reason: HOSPADM

## 2020-01-01 RX ORDER — AZITHROMYCIN 250 MG/1
250 TABLET, FILM COATED ORAL DAILY
Status: DISCONTINUED | OUTPATIENT
Start: 2020-04-09 | End: 2020-01-01

## 2020-01-01 RX ORDER — LOSARTAN POTASSIUM 100 MG/1
TABLET ORAL
Qty: 30 TABLET | Refills: 5 | Status: SHIPPED | OUTPATIENT
Start: 2020-01-01

## 2020-01-01 RX ORDER — EPINEPHRINE 0.1 MG/ML
INJECTION INTRAVENOUS CODE/TRAUMA/SEDATION MEDICATION
Status: COMPLETED | OUTPATIENT
Start: 2020-01-01 | End: 2020-01-01

## 2020-01-01 RX ORDER — ACETAMINOPHEN 500 MG
1000 TABLET ORAL
Status: COMPLETED | OUTPATIENT
Start: 2020-01-01 | End: 2020-01-01

## 2020-01-01 RX ORDER — FAMOTIDINE 10 MG/ML
20 INJECTION INTRAVENOUS DAILY
Status: DISCONTINUED | OUTPATIENT
Start: 2020-04-09 | End: 2020-01-01

## 2020-01-01 RX ORDER — INDOMETHACIN 25 MG/1
50 CAPSULE ORAL EVERY 6 HOURS
Status: DISCONTINUED | OUTPATIENT
Start: 2020-04-09 | End: 2020-01-01

## 2020-01-01 RX ORDER — CANAGLIFLOZIN 100 MG/1
TABLET, FILM COATED ORAL
Qty: 90 TABLET | Refills: 2 | Status: SHIPPED | OUTPATIENT
Start: 2020-01-01

## 2020-01-01 RX ORDER — PEN NEEDLE, DIABETIC 30 GX3/16"
NEEDLE, DISPOSABLE MISCELLANEOUS
Qty: 400 EACH | Refills: 3 | Status: SHIPPED | OUTPATIENT
Start: 2020-01-01

## 2020-01-01 RX ORDER — HYDROXYCHLOROQUINE SULFATE 200 MG/1
400 TABLET, FILM COATED ORAL DAILY
Status: DISCONTINUED | OUTPATIENT
Start: 2020-04-10 | End: 2020-01-01

## 2020-01-01 RX ORDER — SEMAGLUTIDE 1.34 MG/ML
INJECTION, SOLUTION SUBCUTANEOUS
Qty: 2 SYRINGE | Refills: 2 | Status: SHIPPED | OUTPATIENT
Start: 2020-01-01

## 2020-01-01 RX ORDER — PANTOPRAZOLE SODIUM 20 MG/1
20 TABLET, DELAYED RELEASE ORAL DAILY
Qty: 30 TABLET | Refills: 11 | Status: SHIPPED | OUTPATIENT
Start: 2020-01-01 | End: 2021-03-25

## 2020-01-01 RX ORDER — GLUCAGON 1 MG
1 KIT INJECTION
Status: DISCONTINUED | OUTPATIENT
Start: 2020-01-01 | End: 2020-01-01

## 2020-01-01 RX ORDER — POTASSIUM CHLORIDE 20 MEQ/1
20 TABLET, EXTENDED RELEASE ORAL 2 TIMES DAILY
COMMUNITY
Start: 2020-01-01

## 2020-01-01 RX ADMIN — ONDANSETRON 4 MG: 2 INJECTION, SOLUTION INTRAMUSCULAR; INTRAVENOUS at 11:04

## 2020-01-01 RX ADMIN — CEFTRIAXONE 1 G: 1 INJECTION, SOLUTION INTRAVENOUS at 04:04

## 2020-01-01 RX ADMIN — ACETAMINOPHEN 1000 MG: 500 TABLET ORAL at 02:04

## 2020-01-01 RX ADMIN — EPINEPHRINE 1 MG: 0.1 INJECTION, SOLUTION ENDOTRACHEAL; INTRACARDIAC; INTRAVENOUS at 08:04

## 2020-01-01 RX ADMIN — SODIUM BICARBONATE 50 MEQ: 84 INJECTION, SOLUTION INTRAVENOUS at 08:04

## 2020-01-01 RX ADMIN — FUROSEMIDE 40 MG: 10 INJECTION, SOLUTION INTRAMUSCULAR; INTRAVENOUS at 10:04

## 2020-01-01 RX ADMIN — DEXAMETHASONE SODIUM PHOSPHATE 10 MG: 4 INJECTION, SOLUTION INTRA-ARTICULAR; INTRALESIONAL; INTRAMUSCULAR; INTRAVENOUS; SOFT TISSUE at 10:04

## 2020-01-01 RX ADMIN — ENOXAPARIN SODIUM 40 MG: 40 INJECTION, SOLUTION INTRAVENOUS; SUBCUTANEOUS at 10:04

## 2020-01-01 RX ADMIN — EPINEPHRINE 1 MG: 0.1 INJECTION, SOLUTION ENDOTRACHEAL; INTRACARDIAC; INTRAVENOUS at 07:04

## 2020-01-01 RX ADMIN — PROPOFOL 5 MCG/KG/MIN: 10 INJECTION, EMULSION INTRAVENOUS at 09:04

## 2020-01-01 RX ADMIN — MORPHINE SULFATE 4 MG: 10 INJECTION INTRAMUSCULAR; INTRAVENOUS; SUBCUTANEOUS at 11:04

## 2020-01-01 RX ADMIN — AZITHROMYCIN MONOHYDRATE 500 MG: 500 INJECTION, POWDER, LYOPHILIZED, FOR SOLUTION INTRAVENOUS at 06:04

## 2020-01-22 NOTE — PROGRESS NOTES
CC: This 59 y.o. Black or  female  is here for evaluation of  T2DM along with comorbidities indicated in the Visit Diagnosis section of this encounter.    HPI: Michelle Donohue was diagnosed with T2DM in the late 1980s.           Prior visit 8/20/19  a1c is a bit lower from 10.2 to 9.4%. She is disappointed that her A1c is not lower. This is actually the lowest it's been x 6 years. She has been avoiding sweets.   She did start Invokana and had a mild yeast infection. Had increased PM dose of insulin from 50 to 55 units?   Plan Try to exercise 30 minutes five days a week.   Finish off Bydureon. Switch to Ozempic at 0.5 mg once weekly x 4 weeks. Then increase to 1 mg once weekly.   Start Freestyle Federico glucose monitoring system.  Monitor blood sugar every 8 hours in order to get a continuous glucose reading from the Freestyle Federico. Sample kit provided   Test blood sugar before each meal and bedtime.   Make sure to eat a snack at bedtime if blood sugar is less than 120. - 1/2 piece of fruit with some nuts; or 1 slice of wheat bread with peanut butter.   See Diabetes Educator/Registered Dietician for Medical Nutrition Therapy refresher and Federico training.   Return to clinic in 3 months with labs prior.         Interval history  a1c down from 9.4 to 8.2% in December. This is the lowest her A1c has been.     She has switched from Bydureon to Ozempic 1 mg once weekly and denies nausea. Maybe having some constipation but tolerable. Using digestive gummies that help.     Patient has started using Freestyle Federico and loves using it. Unable to download report today because she does not have a Federico account. BGs have been ranging from LO to 300s. She's testing 1-2x/day with the Federico. Tends to get low glucoses more often over night.         LAST DIABETES EDUCATION: 3/9/18     DIABETES MEDICATIONS: Ozempic 1 mg once weekly, Invokana 100 mg once daily,  Novolin 70/30 - 70 units before breakfast and 55 units before  kapil    Misses medication doses - denies. Relies on her alarms for her insulin doses.   Purchases insulin vials from [employer] so monthly cost is > $100.      DM COMPLICATIONS: retinopathy and peripheral neuropathy    SIGNIFICANT DIABETES MED HISTORY:   Metformin dc'd d/t heart failure - pt states that she had recurrent heart failure that resolved once metformin was stopped in 2013.   invokana started 2/2019     SELF MONITORING BLOOD GLUCOSE: as above    HYPOGLYCEMIC EPISODES: frequent lows overnight     CURRENT DIET: drinks coffee w/ AS and creamer, water. no regular sodas.   Eats 3 meals/day but on the weekends skips lunch. Lunch is a salad at work.     CURRENT EXERCISE: just started back     SOCIAL: ; motivated to be healthy for grandparents.     DENTAL: made appt with dentist. Brushes her teeth once daily. Also has partial dentures.       BP (!) 144/84 (BP Location: Left arm, Patient Position: Sitting, BP Method: Large (Automatic))   Pulse 109   Wt 100.2 kg (221 lb)   LMP 11/04/2013   BMI 39.15 kg/m²       ROS:   CONSTITUTIONAL: Appetite good, denies fatigue  GI: no nausea, + constipation   OTHER: n/a      PHYSICAL EXAM:  GENERAL: Well developed, well nourished. No acute distress.   PSYCH: AAOx3, appropriate mood and affect, conversant, well-groomed. Judgement and insight good.   NEURO: Cranial nerves grossly intact. Speech clear, no tremor.   CHEST: Respirations even and unlabored.         Hemoglobin A1C   Date Value Ref Range Status   12/19/2019 8.2 (H) 4.0 - 5.6 % Final     Comment:     ADA Screening Guidelines:  5.7-6.4%  Consistent with prediabetes  >or=6.5%  Consistent with diabetes  High levels of fetal hemoglobin interfere with the HbA1C  assay. Heterozygous hemoglobin variants (HbS, HgC, etc)do  not significantly interfere with this assay.   However, presence of multiple variants may affect accuracy.     08/16/2019 9.4 (H) 4.0 - 5.6 % Final     Comment:     ADA Screening  Guidelines:  5.7-6.4%  Consistent with prediabetes  >or=6.5%  Consistent with diabetes  High levels of fetal hemoglobin interfere with the HbA1C  assay. Heterozygous hemoglobin variants (HbS, HgC, etc)do  not significantly interfere with this assay.   However, presence of multiple variants may affect accuracy.     08/16/2019 9.4 (H) 4.0 - 5.6 % Final     Comment:     ADA Screening Guidelines:  5.7-6.4%  Consistent with prediabetes  >or=6.5%  Consistent with diabetes  High levels of fetal hemoglobin interfere with the HbA1C  assay. Heterozygous hemoglobin variants (HbS, HgC, etc)do  not significantly interfere with this assay.   However, presence of multiple variants may affect accuracy.             Chemistry        Component Value Date/Time     12/19/2019 1245    K 3.9 12/19/2019 1245     12/19/2019 1245    CO2 29 12/19/2019 1245    BUN 13 12/19/2019 1245    CREATININE 1.4 12/19/2019 1245     (H) 12/19/2019 1245        Component Value Date/Time    CALCIUM 10.1 12/19/2019 1245    ALKPHOS 95 09/14/2018 0833    AST 16 09/14/2018 0833    ALT 11 09/14/2018 0833    BILITOT 0.4 09/14/2018 0833    ESTGFRAFRICA 47.8 (A) 12/19/2019 1245    EGFRNONAA 41.4 (A) 12/19/2019 1245          Lab Results   Component Value Date    LDLCALC 78.6 08/16/2019        Ref. Range 8/16/2019 09:00   Cholesterol Latest Ref Range: 120 - 199 mg/dL 145   HDL Latest Ref Range: 40 - 75 mg/dL 38 (L)   Hdl/Cholesterol Ratio Latest Ref Range: 20.0 - 50.0 % 26.2   LDL Cholesterol External Latest Ref Range: 63.0 - 159.0 mg/dL 78.6   Non-HDL Cholesterol Latest Units: mg/dL 107   Total Cholesterol/HDL Ratio Latest Ref Range: 2.0 - 5.0  3.8   Triglycerides Latest Ref Range: 30 - 150 mg/dL 142       Lab Results   Component Value Date    MICALBCREAT 26.8 08/16/2019             ASSESSMENT and PLAN:    A1C GOAL: < 7.5% if no hypoglycemia     1. Uncontrolled type 2 diabetes mellitus with complication, with long-term current use of insulin  Can  try Skin Tac adhesive and Simpatch/Fixc to help Federico sensors adhere.   Continue Ozempic and Invokana.   Stop Novolin 70/30 as it increases risk of hypoglycemia and is limited in effectiveness.   Start Tresiba 60 units once daily. Does not need to be taken with food.   Start Novolog 20 units before each meal. You only need to inject Novolog if you eat carbohydrates. Carbohydrates include beans, rice, pasta, bread, cereal, potatoes, peas, corn, grits, oatmeal, cream of wheat, candy, sodas/juice,  any kind of fruit, milk   Monitor glucose 4x/day before/after meals/bedtime at least every 8 hours with the Federico.   Return to clinic 6 weeks. But we can extend the visit if your blood sugars look good. Send a message once you are sharing your Federico with the clinic.        2. Retinopathy due to secondary diabetes  Improve glycemic control.      3. Essential hypertension  Per PCP - she has appt next week.    4. Congestive heart failure, unspecified HF chronicity, unspecified heart failure type  Improve glycemic control.      5. Severe obesity  Increases insulin resistance.            No orders of the defined types were placed in this encounter.       Follow up in about 6 weeks (around 3/5/2020).

## 2020-01-22 NOTE — PROGRESS NOTES
LINKS immunization registry, Care Everywhere and Health Maintenance updated.  Chart reviewed for overdue Proactive [facility] Encounters health maintenance testing.   Solaraze Pregnancy And Lactation Text: This medication is Pregnancy Category B and is considered safe. There is some data to suggest avoiding during the third trimester. It is unknown if this medication is excreted in breast milk.

## 2020-01-23 NOTE — PATIENT INSTRUCTIONS
Can try Skin Tac adhesive and Simpatch/Fixc to help Federico sensors adhere.   Continue Ozempic and Invokana.   Stop Novolin 70/30 as it increases risk of hypoglycemia and is limited in effectiveness.   Start Tresiba 60 units once daily. Does not need to be taken with food.   Start Novolog 20 units before each meal. You only need to inject Novolog if you eat carbohydrates. Carbohydrates include beans, rice, pasta, bread, cereal, potatoes, peas, corn, grits, oatmeal, cream of wheat, candy, sodas/juice,  any kind of fruit, milk   Monitor glucose 4x/day before/after meals/bedtime at least every 8 hours with the Federico.   Return to clinic 6 weeks. But we can extend the visit if your blood sugars look good. Send a message once you are sharing your Federico with the clinic.

## 2020-01-24 NOTE — TELEPHONE ENCOUNTER
Spoke with pt and informed her that her medication has been changed from Novolog to Humalog per insurance purposes.

## 2020-01-27 NOTE — TELEPHONE ENCOUNTER
----- Message from Sarah Cary MA sent at 1/27/2020  2:15 PM CST -----  Contact: Self  Spoke with pt and she states that [facility] does not have the Rx for Tresiba. Wants to know can the Rx be resent.  ----- Message -----  From: Lina Hollins  Sent: 1/27/2020   2:11 PM CST  To: Nasrin Gomez Staff    Type: Patient Call Back    Who called:Self    What is the request in detail: She states she is confused about her insulin. Please advise.    Can the clinic reply by [facility]?No    Would the patient rather a call back or a response via [facility]? Call    Best call back number:[phone]    Additional Information:n/a

## 2020-01-29 NOTE — TELEPHONE ENCOUNTER
Pt contacted for rescheduling and wanted to know can she get enough tramadol to last til her OV on 2/13/2020

## 2020-02-13 NOTE — PROGRESS NOTES
Chief Complaint   Patient presents with    Diabetes    Abdominal Pain       HPI  Michelle Donohue is a 59 y.o. female with multiple medical diagnoses as listed in the medical history and problem list that presents for follow-up DM. She is having abdominal pain    Abdominal pain-  Started this morning after eating  She ate two boiled eggs, turkey alvarez and yogurt  Pain feels like a squeezing, she has eaten a small amt of food for lunch  Last BM was normal for her and was before her visit  She has also had back pain with frequent burping for two days-pain was in the lower back    DM-  BG has been doing well  Using freestyle sensor  On tresiba 60 units daily  On ozempic and invokana  humalog  A1C 8.2      Patient Active Problem List   Diagnosis    Type 2 diabetes mellitus, uncontrolled, with neuropathy    CHF (congestive heart failure)    Glaucoma (increased eye pressure)    Chronic obstructive airway disease with asthma    Type 2 diabetes mellitus with retinopathy without macular edema    Vitreous hemorrhage    Epiretinal membrane    Sickle cell trait    Essential hypertension    Combined hyperlipidemia associated with type 2 diabetes mellitus    Insulin long-term use    Morbid obesity with BMI of 40.0-44.9, adult    Uncontrolled type 2 diabetes mellitus with stage 3 chronic kidney disease    Traction detachment of retina    Diabetes mellitus type 2, uncontrolled    Hypokalemia    Neuropathy         ROS  Review of Systems   Constitutional: Negative for chills, diaphoresis, fatigue, fever and unexpected weight change.   HENT: Negative for rhinorrhea, sinus pressure, sore throat and tinnitus.    Eyes: Negative for photophobia and visual disturbance.   Respiratory: Negative for cough, shortness of breath and wheezing.    Cardiovascular: Negative for chest pain and palpitations.   Gastrointestinal: Positive for abdominal pain. Negative for blood in stool, constipation, diarrhea, nausea and vomiting.  "  Genitourinary: Negative for dysuria, flank pain, frequency and vaginal discharge.   Musculoskeletal: Positive for arthralgias. Negative for joint swelling.   Skin: Negative for rash.   Neurological: Negative for speech difficulty, weakness, light-headedness and headaches.   Psychiatric/Behavioral: Negative for behavioral problems and dysphoric mood.       Physical Exam  Vitals:    02/13/20 1404   BP: 134/76   BP Location: Left arm   Patient Position: Sitting   BP Method: Large (Manual)   Pulse: (!) 113   Temp: 99.1 °F (37.3 °C)   TempSrc: Oral   SpO2: 98%   Weight: 102 kg (224 lb 15.7 oz)   Height: 5' 3" (1.6 m)    Body mass index is 39.85 kg/m².  Weight: 102 kg (224 lb 15.7 oz)   Height: 5' 3" (160 cm)     Physical Exam   Constitutional: She is oriented to person, place, and time. She appears well-developed and well-nourished. No distress.   Eyes: EOM are normal.   Neck: Neck supple.   Cardiovascular: Normal rate and regular rhythm. Exam reveals no gallop and no friction rub.   No murmur heard.  Pulmonary/Chest: Effort normal and breath sounds normal. No respiratory distress. She has no wheezes. She has no rales.   Abdominal: Soft. Bowel sounds are normal. She exhibits no distension. There is no tenderness.   Lymphadenopathy:     She has no cervical adenopathy.   Neurological: She is alert and oriented to person, place, and time.   Skin: Skin is warm and dry. No rash noted.   Psychiatric: She has a normal mood and affect. Her behavior is normal.   Nursing note and vitals reviewed.      ALLERGIES AND MEDICATIONS: updated and reviewed.  Review of patient's allergies indicates:   Allergen Reactions    Lisinopril Other (See Comments)     cough     Medication List with Changes/Refills   Current Medications    ALBUTEROL (PROAIR HFA) 90 MCG/ACTUATION INHALER    Inhale 2 puffs into the lungs every 6 (six) hours as needed.    AMLODIPINE (NORVASC) 10 MG TABLET    Take 1 tablet (10 mg total) by mouth once daily.    " "ATORVASTATIN (LIPITOR) 40 MG TABLET    Take 1 tablet (40 mg total) by mouth once daily.    CARVEDILOL (COREG) 6.25 MG TABLET    Take 1 tablet (6.25 mg total) by mouth every evening.    CLONIDINE (CATAPRES) 0.1 MG TABLET    TAKE 1 TABLET BY MOUTH TWICE DAILY    DIABETIC SUPPLIES, [employer]. KIT    Please change sensor every 14 days. FreeStyle Federico Sensor 30-day supply (2 sensors/month)    FREESTYLE FEDERICO 14 DAY SENSOR KIT    CHANGE SENSOR EVERY 14 DAYS    FUROSEMIDE (LASIX) 40 MG TABLET    Take 1 tablet (40 mg total) by mouth 2 (two) times daily.    GABAPENTIN (NEURONTIN) 800 MG TABLET    TAKE 1 TABLET BY MOUTH TWICE DAILY    INSULIN DEGLUDEC (TRESIBA FLEXTOUCH U-200) 200 UNIT/ML (3 ML) INPN    Inject 60 Units into the skin once daily.    INSULIN LISPRO (HUMALOG KWIKPEN INSULIN) 100 UNIT/ML PEN    Inject 20 Units into the skin 3 (three) times daily before meals.    INVOKANA 100 MG TAB    TAKE 1 TABLET BY MOUTH ONCE DAILY    LOSARTAN (COZAAR) 100 MG TABLET    Take 1 tablet (100 mg total) by mouth once daily.    NORTRIPTYLINE (PAMELOR) 25 MG CAPSULE    Take 1 capsule (25 mg total) by mouth every evening.    OZEMPIC 1 MG/DOSE (2 MG/1.5 ML) PNIJ    INJECT 1 MG UNDER THE SKIN ONCE A WEEK    PANTOPRAZOLE (PROTONIX) 20 MG TABLET    Take 1 tablet (20 mg total) by mouth once daily.    PEN NEEDLE, DIABETIC (BD FANNIE 2ND GEN PEN NEEDLE) 32 GAUGE X 5/32" NDLE    Use 4x/day    POTASSIUM CHLORIDE SA (K-DUR,KLOR-CON) 20 MEQ TABLET    TAKE 1 TABLET BY MOUTH TWICE DAILY    POTASSIUM CHLORIDE SA (K-DUR,KLOR-CON) 20 MEQ TABLET    Take 20 mEq by mouth 2 (two) times daily.    TRAMADOL (ULTRAM) 50 MG TABLET    Take 1 tablet (50 mg total) by mouth every 12 (twelve) hours as needed.       Assessment & Plan  1. 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 Essential hypertension    3. Abdominal pain, unspecified abdominal " location        Problem List Items Addressed This Visit        Ophtho    Type 2 diabetes mellitus with retinopathy without macular edema - Primary (Chronic)  -improving  -having episodes of low BG due to skipping meals  -encourage her to snack if she cannot eat but to avoid skipping meals       Cardiac/Vascular    Essential hypertension (Chronic)  -controlled on current regimen      Other Visit Diagnoses     Abdominal pain, unspecified abdominal location      -suspect GERD exacerbated by pt skipping meals          Follow up in about 4 months (around 6/13/2020) for Follow up.    Other Orders Placed This Visit:  No orders of the defined types were placed in this encounter.

## 2020-02-14 NOTE — TELEPHONE ENCOUNTER
Reason for Disposition   Foot is cool or blue in comparison to other side    Additional Information   Negative: Sounds like a life-threatening emergency to the triager   Negative: Caused by an animal bite   Negative: Caused by a human bite   Negative: Foreign body in skin (e.g., splinter, sliver)   Negative: Injury is a puncture wound   Negative: Followed a foot or ankle injury   Negative: SEVERE pain    Protocols used: DIABETES - FOOT PROBLEMS AND EFAZFKEAL-S-JA  toenail looked black now toe and toe next to it is turning black. Hx DM. rec ED. Pt states she has no ride. Rec EMS due to no ride. Pt states that she cant go as she is home with grand kids.  Reiterate ED/EMS. Pt states she will call for OV Monday. call back with questions.

## 2020-02-17 NOTE — TELEPHONE ENCOUNTER
Michelle reports black, hard skin on her toe. Hx of DM.  She did call triage line 02/14 with this problem, and was advised to go to ED at that time but she had no one to keep her grandchildren and no transportation.  She did not seek care since that day.  Symptoms remain, black skin has enlarged but still on that toe.  Recommend ED again to her.  She is at her work now, across street from [facility] and states she will go there.  Message to Rosalie Brady MD, pcp, Patricia Alexandra NP in endocrine, and to Dr Buchanan, podiatry. Please contact caller directly with any additional care advice.      Reason for Disposition   Purple or black skin on foot or toe    Additional Information   Negative: Followed an ankle or foot injury   Negative: Ankle pain is the main symptom   Negative: Entire foot is cool or blue in comparison to other foot    Protocols used: FOOT PAIN-A-OH

## 2020-02-20 PROBLEM — I73.9 PERIPHERAL ARTERY DISEASE: Status: ACTIVE | Noted: 2020-01-01

## 2020-02-20 NOTE — TELEPHONE ENCOUNTER
----- Message from Brooke Garcia sent at 2/20/2020  1:40 PM CST -----  Contact: Self   Type: Patient Call Back    What is the request in detail: Pt requesting a referral to see a vascular surgeon      Can the clinic reply by [facility]? No    Would the patient rather a call back or a response via [facility]? Call back     Best call back number: [phone] ask for rebecca

## 2020-02-20 NOTE — TELEPHONE ENCOUNTER
Hospital Course:   The patient is a 59 y.o. female with a history of HTN, DM2 with neuropathy and retinopathy, HLD, heart failure who presented to the ER with complaints of black toe.   The pt notes that she last saw her podiatrist at [facility] in October and she was fine. Since then she noted some discoloration of her 3rd toe on her L foot. This has progressively gotten more prominent and her 4th and 5th toes also show some black on their tips. This isnt really painful but doesn't feel normal. She denies any F/C, no N/V, no CP/SOB.  She called her podiatrist to try to get an appointment but she was told to go to the ER.    1) black toes  Concerning for arterial insuff. Doesn't look infected. XR showed no gas or bone break down. Unable to palpate pulses but they were dopplerable. XR showed calcifications of lower ext blood vessels  Arterial US shows PVD. Was seen by podiatry, no acute intervention needed. Recommended vascular surgery eval.   Vascular surgery saw pt and recommended angiogram.   Pt requested to follow up with vascular surgery at [facility] as all of her Drs and all of her care has otherwise been over there.   Pt DC to home with instructions to follow up with her podiatrist and [facility]

## 2020-02-20 NOTE — TELEPHONE ENCOUNTER
Patient states that Monday 02/17/2020 she was admitted to [facility] due to her toe nail being black. She states that the notified her she may have a vascular problem and needs to see a vascular surgeon. She also states that they notified her that she might need angiogram.

## 2020-02-20 NOTE — TELEPHONE ENCOUNTER
I submitted the order for vascular.  She also needs to follow up with Dr. Brady.  She is on Invokana and with possible peripheral vascular disease will probably need to stop that and choose alternative medication.

## 2020-02-21 NOTE — TELEPHONE ENCOUNTER
Spoke with pt and informed her that Tresiba is not covered by insurance.   rx for Toujeo sent instead. It should be affordable as long as she uses coupon card which can be found online.

## 2020-02-21 NOTE — TELEPHONE ENCOUNTER
----- Message from Sarah Cary MA sent at 2/21/2020  9:25 AM CST -----  Contact: Madison lambert/[facility]  [phone] Pt's Key ID [id number]  Should I do the PA for this pt?  ----- Message -----  From: Adwoa Zabala  Sent: 2/20/2020   3:21 PM CST  To: Nasrin Gomez Staff    Type:PA Call Back    Who called: Madison lambert/[employer]    What is the request in detail: Following up on PA for insulin degludec (TRESIBA FLEXTOUCH U-200) 200 unit/mL (3 mL) InPn. Please contact [employer] in regards to this.     Would the patient rather a call back or a response via [facility]? Call back    Best call back number:  [phone] Pt's Key ID [id number]

## 2020-02-21 NOTE — TELEPHONE ENCOUNTER
Tresiba is not covered by insurance.   rx for Toujeo sent instead. It should be affordable as long as she uses coupon card which can be found online.

## 2020-03-03 NOTE — TELEPHONE ENCOUNTER
----- Message from Honey Hathaway sent at 3/3/2020 11:02 AM CST -----  Contact: self : [phone]  .Type: Patient Call Back    Who called: self     What is the request in detail: Requesting a email to be sent to her with link to link the sensor. Please email to [email or web address]    Can the clinic reply by [facility]? Call back     Would the patient rather a call back or a response via [facility]? Call back     Best call back number: [phone]

## 2020-03-04 NOTE — TELEPHONE ENCOUNTER
Spoke with pt to update her we were unable to link her to the Federico system because it keeps linking her to the work email. I deleted the chart again and recreated it using the work email all over again and helped the pt accept the invite. The pt has been linked to the [employer] system and wants Patricia to take a look at her numbers due to them being up and down.

## 2020-03-04 NOTE — TELEPHONE ENCOUNTER
----- Message from Adwoa Zabala sent at 3/4/2020 11:26 AM CST -----  Contact: Patient  [phone] work  Type:  Patient Returning Call    Who Called: Patient    Who Left Message for Patient:  Sarah    Does the patient know what this is regarding?: Federico sensor readings    Would the patient rather a call back or a response via [facility]? Call back    Best Call Back Number: [phone] work

## 2020-03-04 NOTE — TELEPHONE ENCOUNTER
Reviewed CGM report. She is having low BGs overnight down to low 50s. High BGs noted during the day into high 200s/low 300s. She reports often taking Humalog late because she tries to time Humalog 15 minutes prior to meals and then forgets to take it.   Advised her :   Decrease Toujeo from 60 to 45 units every morning. Inject Humalog immediately prior to meals in order to improve adherence.   Keep food/insulin diary and bring to visit next week.     She voiced understanding.

## 2020-03-05 NOTE — LETTER
March 5, 2020    Michelle Donohue  [address] [place] [id number]             [facility]  [address] [place] [address]  Phone: [phone]  Fax: [phone] Dear Ms. Donohue:    Sorry we were unable to contact you to schedule your Vascular Surgery  appointment. Please give the referral department a call at [phone].      If you have any questions or concerns, please don't hesitate to call.    Sincerely,        Kaity Mckeon MA

## 2020-04-08 PROBLEM — R74.01 TRANSAMINITIS: Status: ACTIVE | Noted: 2020-01-01

## 2020-04-08 PROBLEM — R06.03 ACUTE RESPIRATORY DISTRESS: Status: ACTIVE | Noted: 2020-01-01

## 2020-04-08 PROBLEM — Z51.5 COMFORT MEASURES ONLY STATUS: Status: ACTIVE | Noted: 2020-01-01

## 2020-04-08 PROBLEM — U07.1 COVID-19 VIRUS INFECTION: Status: ACTIVE | Noted: 2020-01-01

## 2020-04-08 PROBLEM — J96.01 ACUTE HYPOXEMIC RESPIRATORY FAILURE: Status: ACTIVE | Noted: 2020-01-01

## 2020-04-08 NOTE — ED TRIAGE NOTES
"Pt presents to the ED via EMS from home reporting a cough as well as SOB that began 5 days ago. Pt reports the cough has been frequent and productive. Pt also reporting loss of appetite. Pt also reports a fever with "blood tinged sputum." Pt denies chest pain at this time. Pt also reporting generalized body aches and weakness. Pt AAOx4. Pt initial RA sats were in the 80s and pt placed on venti mask at 50%. Currently satting 93%. Pt hooked up to cardiac monitor.  "

## 2020-04-08 NOTE — HPI
60 year-old female who presents to the ED via EMS with an onset of generalized malaise, fevers, and fatigue lasting three days and significantly worsening today. She also reports mild cough and diarrhea lasting two days. Per EMS, they arrived on scene to find her with an oxygen saturation of 80% on room air. The patient denies any chest pain or bloody stool. Past medical history includes hyperlipidemia, diabetes, heart failure, and asthma. Social history of several family members living with her at home, none of whom are sick.    Pulmoanry consulted for acute hypoxic respiratory failure.

## 2020-04-08 NOTE — CONSULTS
[facility]  Pulmonology  Consult Note    Patient Name: Michelle Donohue  MRN: [id number]  Admission Date: 4/8/2020  Hospital Length of Stay: 0 days  Code Status: Full Code  Attending Physician: No att. providers found  Primary Care Provider: Rosalie Brady MD   Principal Problem: <principal problem not specified>    Inpatient consult to Pulmonology  Consult performed by: Terry Fenton MD  Consult ordered by: Richy Person MD  Reason for consult: Acute hypoxic respiratory failure    Inpatient consult to Pulmonology  Consult performed by: Terry Fenton MD  Consult ordered by: Ryann Galo MD  Reason for consult: Acute hypoxic respiratory failure        Subjective:     HPI:  60 year-old female who presents to the ED via EMS with an onset of generalized malaise, fevers, and fatigue lasting three days and significantly worsening today. She also reports mild cough and diarrhea lasting two days. Per EMS, they arrived on scene to find her with an oxygen saturation of 80% on room air. The patient denies any chest pain or bloody stool. Past medical history includes hyperlipidemia, diabetes, heart failure, and asthma. Social history of several family members living with her at home, none of whom are sick.    Pulmoanry consulted for acute hypoxic respiratory failure.     Past Medical History:   Diagnosis Date    Asthma     Diabetes mellitus, type 2     Diabetic neuropathy     Diabetic retinopathy     Epiretinal membrane, left eye     Heart failure     Hyperlipidemia     Hypertension     Retinal detachment     Sickle cell trait     Vitreous hemorrhage of both eyes        Past Surgical History:   Procedure Laterality Date    Avastin Injection Bilateral     BREAST BIOPSY      age 16    BREAST LUMPECTOMY      right breast    BTL      CATARACT EXTRACTION W/  INTRAOCULAR LENS IMPLANT Left 3/25/2015    [facility] (Dr. Montes)    CATARACT EXTRACTION W/  INTRAOCULAR LENS IMPLANT Right  6/17/15    OD ()    PANRETINAL PHOTOCOAGULATION      Both eyes       Review of patient's allergies indicates:   Allergen Reactions    Lisinopril Other (See Comments)     cough       Family History     Problem Relation (Age of Onset)    Diabetes Mother, Brother    Heart attack Father    Heart failure Father (60), Mother, Brother, Sister    Hyperlipidemia Father (60), Brother    Hypertension Father (60), Mother, Brother        Tobacco Use    Smoking status: Never Smoker    Smokeless tobacco: Never Used   Substance and Sexual Activity    Alcohol use: No     Alcohol/week: 0.0 standard drinks    Drug use: No    Sexual activity: Yes     Partners: Male     Comment: . 2 children. works as a .         Review of Systems   Constitutional: Positive for activity change and fatigue.   HENT: Negative for congestion and postnasal drip.    Respiratory: Positive for shortness of breath and wheezing.    Cardiovascular: Positive for chest pain. Negative for palpitations.   Gastrointestinal: Negative for abdominal distention and abdominal pain.   Endocrine: Negative for cold intolerance and heat intolerance.   Genitourinary: Negative for difficulty urinating and dysuria.   Musculoskeletal: Negative for arthralgias.   Allergic/Immunologic: Negative for environmental allergies and food allergies.   Neurological: Negative for dizziness, light-headedness and headaches.   Hematological: Negative for adenopathy.   Psychiatric/Behavioral: Negative for agitation and behavioral problems.     Objective:     Vital Signs (Most Recent):  Temp: (!) 100.8 °F (38.2 °C) (04/08/20 1517)  Pulse: 110 (04/08/20 1601)  Resp: (!) 29 (04/08/20 1601)  BP: 139/69 (04/08/20 1601)  SpO2: 96 % (04/08/20 1601) Vital Signs (24h Range):  Temp:  [100.8 °F (38.2 °C)-103 °F (39.4 °C)] 100.8 °F (38.2 °C)  Pulse:  [110-125] 110  Resp:  [24-30] 29  SpO2:  [94 %-100 %] 96 %  BP: (139-159)/(69-79) 139/69     Weight: 104.3 kg (230  lb)  Body mass index is 37.12 kg/m².    No intake or output data in the 24 hours ending 04/08/20 1719    Physical Exam   Constitutional: She appears well-developed and well-nourished. No distress.   HENT:   Head: Normocephalic and atraumatic.   Eyes: Pupils are equal, round, and reactive to light. EOM are normal.   Neck: Normal range of motion. Neck supple.   Cardiovascular: Normal rate and regular rhythm.   Pulmonary/Chest: She has rales.   Tachypnea   Musculoskeletal: Normal range of motion. She exhibits no edema.   Neurological: She is alert.   Skin: Skin is warm and dry. She is not diaphoretic.   Psychiatric: She has a normal mood and affect. Her behavior is normal.   Nursing note and vitals reviewed.      Vents:  Oxygen Concentration (%): 60 (04/08/20 1551)    Lines/Drains/Airways     Peripherally Inserted Central Catheter Line            PICC Triple Lumen 04/08/20 1630 right basilic less than 1 day          Peripheral Intravenous Line                 Peripheral IV - Single Lumen 04/08/20 20 G Left Hand less than 1 day                Significant Labs:    CBC/Anemia Profile:  Recent Labs   Lab 04/08/20  1413   WBC 4.80   HGB 13.0   HCT 40.8      MCV 74*   RDW 14.8*   FERRITIN 987*        Chemistries:  Recent Labs   Lab 04/08/20  1413   *   K 4.4      CO2 22*   BUN 14   CREATININE 1.3   CALCIUM 8.4*   ALBUMIN 3.4*   PROT 8.4   BILITOT 0.2   ALKPHOS 74   ALT 29   AST 51*       All pertinent labs within the past 24 hours have been reviewed.    Significant Imaging:   I have reviewed and interpreted all pertinent imaging results/findings within the past 24 hours.    Assessment/Plan:     Acute hypoxemic respiratory failure  Patient with acute hypoxic respiratory failure secondary to COVID-19. CXR with bilateral airspace disease. BNP normal. Procalcitonin normal.   -Given tachypnea, consider NIPPV(Bipap) as needed. Comfort floe to give her some breaks.   -Solumedrol 40mg BID given asthma and  wheezing.  -HQ and Azithro for 5 days.     Monitor in ICU.     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     Critical Care time for the evaluation and treatment for severe organ dysfunction, review of pertinent labs and imaging studies discussions with primary team/consulting services and discussions with patient.    Continue ICU care.         Thank you for your consult. I will follow-up with patient. Please contact us if you have any additional questions.     Terry Fenton MD  Pulmonology  [facility]

## 2020-04-08 NOTE — SUBJECTIVE & OBJECTIVE
Past Medical History:   Diagnosis Date    Asthma     Diabetes mellitus, type 2     Diabetic neuropathy     Diabetic retinopathy     Epiretinal membrane, left eye     Heart failure     Hyperlipidemia     Hypertension     Retinal detachment     Sickle cell trait     Vitreous hemorrhage of both eyes        Past Surgical History:   Procedure Laterality Date    Avastin Injection Bilateral     BREAST BIOPSY      age 16    BREAST LUMPECTOMY      right breast    BTL      CATARACT EXTRACTION W/  INTRAOCULAR LENS IMPLANT Left 3/25/2015    [facility] (Dr. Montes)    CATARACT EXTRACTION W/  INTRAOCULAR LENS IMPLANT Right 6/17/15    OD ()    PANRETINAL PHOTOCOAGULATION      Both eyes       Review of patient's allergies indicates:   Allergen Reactions    Lisinopril Other (See Comments)     cough       Family History     Problem Relation (Age of Onset)    Diabetes Mother, Brother    Heart attack Father    Heart failure Father (60), Mother, Brother, Sister    Hyperlipidemia Father (60), Brother    Hypertension Father (60), Mother, Brother        Tobacco Use    Smoking status: Never Smoker    Smokeless tobacco: Never Used   Substance and Sexual Activity    Alcohol use: No     Alcohol/week: 0.0 standard drinks    Drug use: No    Sexual activity: Yes     Partners: Male     Comment: . 2 children. works as a .         Review of Systems   Constitutional: Positive for activity change and fatigue.   HENT: Negative for congestion and postnasal drip.    Respiratory: Positive for shortness of breath and wheezing.    Cardiovascular: Positive for chest pain. Negative for palpitations.   Gastrointestinal: Negative for abdominal distention and abdominal pain.   Endocrine: Negative for cold intolerance and heat intolerance.   Genitourinary: Negative for difficulty urinating and dysuria.   Musculoskeletal: Negative for arthralgias.   Allergic/Immunologic: Negative for environmental allergies and  food allergies.   Neurological: Negative for dizziness, light-headedness and headaches.   Hematological: Negative for adenopathy.   Psychiatric/Behavioral: Negative for agitation and behavioral problems.     Objective:     Vital Signs (Most Recent):  Temp: (!) 100.8 °F (38.2 °C) (04/08/20 1517)  Pulse: 110 (04/08/20 1601)  Resp: (!) 29 (04/08/20 1601)  BP: 139/69 (04/08/20 1601)  SpO2: 96 % (04/08/20 1601) Vital Signs (24h Range):  Temp:  [100.8 °F (38.2 °C)-103 °F (39.4 °C)] 100.8 °F (38.2 °C)  Pulse:  [110-125] 110  Resp:  [24-30] 29  SpO2:  [94 %-100 %] 96 %  BP: (139-159)/(69-79) 139/69     Weight: 104.3 kg (230 lb)  Body mass index is 37.12 kg/m².    No intake or output data in the 24 hours ending 04/08/20 1719    Physical Exam   Constitutional: She appears well-developed and well-nourished. No distress.   HENT:   Head: Normocephalic and atraumatic.   Eyes: Pupils are equal, round, and reactive to light. EOM are normal.   Neck: Normal range of motion. Neck supple.   Cardiovascular: Normal rate and regular rhythm.   Pulmonary/Chest: She has rales.   Tachypnea   Musculoskeletal: Normal range of motion. She exhibits no edema.   Neurological: She is alert.   Skin: Skin is warm and dry. She is not diaphoretic.   Psychiatric: She has a normal mood and affect. Her behavior is normal.   Nursing note and vitals reviewed.      Vents:  Oxygen Concentration (%): 60 (04/08/20 1551)    Lines/Drains/Airways     Peripherally Inserted Central Catheter Line            PICC Triple Lumen 04/08/20 1630 right basilic less than 1 day          Peripheral Intravenous Line                 Peripheral IV - Single Lumen 04/08/20 20 G Left Hand less than 1 day                Significant Labs:    CBC/Anemia Profile:  Recent Labs   Lab 04/08/20  1413   WBC 4.80   HGB 13.0   HCT 40.8      MCV 74*   RDW 14.8*   FERRITIN 987*        Chemistries:  Recent Labs   Lab 04/08/20  1413   *   K 4.4      CO2 22*   BUN 14   CREATININE  1.3   CALCIUM 8.4*   ALBUMIN 3.4*   PROT 8.4   BILITOT 0.2   ALKPHOS 74   ALT 29   AST 51*       All pertinent labs within the past 24 hours have been reviewed.    Significant Imaging:   I have reviewed and interpreted all pertinent imaging results/findings within the past 24 hours.

## 2020-04-08 NOTE — ASSESSMENT & PLAN NOTE
Patient with acute hypoxic respiratory failure secondary to COVID-19. CXR with bilateral airspace disease. BNP normal. Procalcitonin normal.   -Given tachypnea, consider NIPPV(Bipap) as needed. Comfort floe to give her some breaks.   -Solumedrol 40mg BID given asthma and wheezing.  -HQ and Azithro for 5 days.     Monitor in ICU.

## 2020-04-08 NOTE — TELEPHONE ENCOUNTER
Pt calling, hx asthma, states she is struggling for each breath, no relief with breathing tx and states she is now coughing up red mucous. Advised patient per protocol. Patient verbalized understanding but states she doesn't want to go to the ED. I reiterated the severity of her symptoms and why we advise 911. She verbalized understanding.      Reason for Disposition   SEVERE difficulty breathing (e.g., struggling for each breath, speaks in single words, pulse > 120)    Additional Information   Negative: Breathing stopped and hasn't returned   Negative: Choking on something    Protocols used: BREATHING DIFFICULTY-A-OH

## 2020-04-08 NOTE — PROCEDURES
"Michelle Donohue is a 60 y.o. female patient.    Temp: (!) 100.8 °F (38.2 °C) (04/08/20 1517)  Pulse: 110 (04/08/20 1601)  Resp: (!) 29 (04/08/20 1601)  BP: 139/69 (04/08/20 1601)  SpO2: 96 % (04/08/20 1601)  Weight: 104.3 kg (230 lb) (04/08/20 1339)  Height: 5' 6" (167.6 cm) (04/08/20 1339)    PICC  Date/Time: 4/8/2020 4:30 PM  Performed by: Fuentes Meyer RN  Consent Done: Yes  Time out: Immediately prior to procedure a time out was called to verify the correct patient, procedure, equipment, support staff and site/side marked as required  Indications: med administration and vascular access  Anesthesia: local infiltration  Local anesthetic: lidocaine 1% without epinephrine  Anesthetic Total (mL): 3  Preparation: skin prepped with ChloraPrep  Skin prep agent dried: skin prep agent completely dried prior to procedure  Sterile barriers: all five maximum sterile barriers used - cap, mask, sterile gown, sterile gloves, and large sterile sheet  Hand hygiene: hand hygiene performed prior to central venous catheter insertion  Location details: right basilic  Catheter type: triple lumen  Catheter size: 5 Fr  Catheter Length: 35cm    Ultrasound guidance: yes  Vessel Caliber: medium, compressibility normal  Needle advanced into vessel with real time Ultrasound guidance.  Guidewire confirmed in vessel.  Sterile sheath used.  Number of attempts: 1  Post-procedure: blood return through all ports, chlorhexidine patch and sterile dressing applied            Fuentes Meyer  4/8/2020  "

## 2020-04-08 NOTE — ED PROVIDER NOTES
Encounter Date: 2020    SCRIBE #1 NOTE: I, Clint Cabrera am scribing for, and in the presence of, Richy Person MD.       History     Chief Complaint   Patient presents with    Cough     EMS reports shortness of breath, cough and bloody tinged sputum with fevers. symptoms x 5 days.      Ms. Michelle Donohue is a 60 year-old female who presents to the ED via EMS with an onset of generalized malaise, fevers, and fatigue lasting three days and significantly worsening today. She also reports mild cough and diarrhea lasting two days. Per EMS, they arrived on scene to find her with an oxygen saturation of 80% on room air. The patient denies any chest pain or bloody stool. Past medical history includes hyperlipidemia, diabetes, heart failure, and asthma. Social history of several family members living with her at home, none of whom are sick.    The history is provided by the patient.     Review of patient's allergies indicates:   Allergen Reactions    Lisinopril Other (See Comments)     cough     Past Medical History:   Diagnosis Date    Asthma     Diabetes mellitus, type 2     Diabetic neuropathy     Diabetic retinopathy     Epiretinal membrane, left eye     Heart failure     Hyperlipidemia     Hypertension     Retinal detachment     Sickle cell trait     Vitreous hemorrhage of both eyes      Past Surgical History:   Procedure Laterality Date    Avastin Injection Bilateral     BREAST BIOPSY      age 16    BREAST LUMPECTOMY      right breast    BTL      CATARACT EXTRACTION W/  INTRAOCULAR LENS IMPLANT Left 3/25/2015    OS (Dr. Montes)    CATARACT EXTRACTION W/  INTRAOCULAR LENS IMPLANT Right 6/17/15    OD ()    PANRETINAL PHOTOCOAGULATION      Both eyes     Family History   Problem Relation Age of Onset    Heart failure Father 60            Hyperlipidemia Father 60            Hypertension Father 60            Heart attack Father     Diabetes Mother      Hypertension Mother     Heart failure Mother     Diabetes Brother     Heart failure Brother     Hyperlipidemia Brother     Hypertension Brother     Heart failure Sister      Social History     Tobacco Use    Smoking status: Never Smoker    Smokeless tobacco: Never Used   Substance Use Topics    Alcohol use: No     Alcohol/week: 0.0 standard drinks    Drug use: No     Review of Systems   Constitutional: Positive for fatigue and fever.   Respiratory: Positive for cough and shortness of breath.    Gastrointestinal: Positive for diarrhea. Negative for blood in stool.   All other systems reviewed and are negative.      Physical Exam     Initial Vitals [04/08/20 1339]   BP Pulse Resp Temp SpO2   (!) 159/76 (!) 125 (!) 30 (!) 103 °F (39.4 °C) (!) 94 %      MAP       --         Physical Exam    Nursing note and vitals reviewed.  Constitutional: She appears well-developed and well-nourished. Nasal cannula in place.   Oxygen saturation of 88% on 4 LPM nasal canula, placed by EMS prior to arrival.   HENT:   Head: Normocephalic and atraumatic.   Eyes: EOM are normal.   Neck: Normal range of motion. Neck supple.   Cardiovascular: Regular rhythm, normal heart sounds and intact distal pulses. Tachycardia present.  Exam reveals no gallop and no friction rub.    No murmur heard.  Tachycardia at regular rhythm. Heart sounds are otherwise normal with no rubs, gallops, or murmurs.   Pulmonary/Chest: Breath sounds normal. Tachypnea noted. She is in respiratory distress. She has no wheezes. She has no rhonchi. She has no rales.   Moderate respiratory distress and tachypnea. Lung sounds otherwise clear and equal to auscultation bilaterally without wheezes, rhonchi, or rales.   Abdominal: Soft. She exhibits no distension. There is no tenderness. There is no rebound and no guarding.   Musculoskeletal: Normal range of motion. She exhibits no edema or tenderness.   Neurological: She is alert and oriented to person, place, and time.  She has normal strength.   Awake, alert, and oriented to person, place, and time. Moving all extremities equally with no gross neurological deficit.   Skin: Skin is warm and dry.   Psychiatric: She has a normal mood and affect. Her behavior is normal. Judgment and thought content normal.         ED Course   Critical Care  Date/Time: 4/8/2020 6:39 PM  Performed by: Richy Person MD  Authorized by: Richy Person MD   Direct patient critical care time: 10 minutes  Additional history critical care time: 5 minutes  Ordering / reviewing critical care time: 5 minutes  Documentation critical care time: 5 minutes  Consulting other physicians critical care time: 5 minutes  Total critical care time (exclusive of procedural time) : 30 minutes        Labs Reviewed   CBC W/ AUTO DIFFERENTIAL - Abnormal; Notable for the following components:       Result Value    RBC 5.50 (*)     Mean Corpuscular Volume 74 (*)     Mean Corpuscular Hemoglobin 23.6 (*)     Mean Corpuscular Hemoglobin Conc 31.9 (*)     RDW 14.8 (*)     Lymph # 0.5 (*)     Mono # 0.2 (*)     Gran% 84.4 (*)     Lymph% 10.8 (*)     All other components within normal limits   COMPREHENSIVE METABOLIC PANEL - Abnormal; Notable for the following components:    Sodium 135 (*)     CO2 22 (*)     Glucose 121 (*)     Calcium 8.4 (*)     Albumin 3.4 (*)     AST 51 (*)     eGFR if  52 (*)     eGFR if non  45 (*)     All other components within normal limits   C-REACTIVE PROTEIN - Abnormal; Notable for the following components:    CRP 97.1 (*)     All other components within normal limits   FERRITIN - Abnormal; Notable for the following components:    Ferritin 987 (*)     All other components within normal limits   SARS-COV-2 RNA AMPLIFICATION, QUAL - Abnormal; Notable for the following components:    SARS-CoV-2 RNA, Amplification, Qual Positive (*)     All other components within normal limits   LACTATE DEHYDROGENASE - Abnormal; Notable  for the following components:     (*)     All other components within normal limits   CULTURE, BLOOD   CULTURE, BLOOD   CK   LACTIC ACID, PLASMA   TROPONIN I   PROCALCITONIN   B-TYPE NATRIURETIC PEPTIDE   PROCALCITONIN   SEDIMENTATION RATE   POCT INFLUENZA A/B MOLECULAR        ECG Results          EKG 12-lead (In process)  Result time 04/08/20 13:57:19    In process by Interface, Lab In [facility] (04/08/20 13:57:19)                 Narrative:    Test Reason : R68.89,    Vent. Rate : 129 BPM     Atrial Rate : 129 BPM     P-R Int : 138 ms          QRS Dur : 104 ms      QT Int : 312 ms       P-R-T Axes : 054 -47 076 degrees     QTc Int : 457 ms    Sinus tachycardia  Left axis deviation  Abnormal ECG  When compared with ECG of 16-MAR-2015 15:38,  No significant change was found    Referred By: System System           Confirmed By:                   In process by Interface, Lab In [facility] (04/08/20 13:54:13)                 Narrative:    Test Reason : R68.89,    Vent. Rate : 129 BPM     Atrial Rate : 129 BPM     P-R Int : 138 ms          QRS Dur : 104 ms      QT Int : 312 ms       P-R-T Axes : 054 -47 076 degrees     QTc Int : 457 ms    Sinus tachycardia  Left axis deviation  Abnormal ECG  When compared with ECG of 16-MAR-2015 15:38,  No significant change was found    Referred By: System System           Confirmed By:                             Imaging Results          X-Ray Chest 1 View for PICC_Central line (Final result)  Result time 04/08/20 16:52:22    Final result by Rufino Gallagher MD (04/08/20 16:52:22)                 Impression:      As above.      Electronically signed by: Rufino Gallagher MD  Date:    04/08/2020  Time:    16:52             Narrative:    EXAMINATION:  XR CHEST 1 VIEW    CLINICAL HISTORY:  Evaluate PICC line placement;    TECHNIQUE:  Single frontal view of the chest was performed.    COMPARISON:  Chest radiograph earlier same day at 14:55 hours    FINDINGS:  Large body habitus.  Patient is  rotated.  Monitoring leads overlie the chest.    Interval placement of right-sided PICC line with tip overlying the mid to distal SVC.  No large pneumothorax or definite new focal opacity.  Otherwise, grossly stable radiographic appearance of the chest.                               X-Ray Chest AP Portable (Final result)  Result time 04/08/20 15:06:00    Final result by Ronda Lorenzo MD (04/08/20 15:06:00)                 Impression:      1. Bilateral ground-glass pulmonary opacities with somewhat peripheral and basilar distribution.  The pattern is most suggestive of viral pneumonia with edema less likely.  2. Stable mildly enlarged cardiac silhouette.      Electronically signed by: Ronda Lorenzo MD  Date:    04/08/2020  Time:    15:06             Narrative:    EXAMINATION:  XR CHEST AP PORTABLE    CLINICAL HISTORY:  Suspected Covid-19 Virus Infection;    TECHNIQUE:  Single frontal view of the chest was performed.    COMPARISON:  Prior dated 03/03/2016    FINDINGS:  Mediastinal structures are midline.  The cardiac silhouette is enlarged and stable.  There are bilateral ground-glass pulmonary opacities with somewhat peripheral and basilar distribution in a pattern most suspicious for viral pneumonia with edema less likely.                                 Medical Decision Making:   History:   Old Medical Records: I decided to obtain old medical records.  Initial Assessment:   Past medical records queried and reviewed, including PMHx of type II diabetes, HLD, and asthma.     60 year old female presents to the ED complaining of multiple complaints, primarily shortness of breath and cough. The patient was seen and examined. The history and physical exam was obtained. The nursing notes and vital signs were reviewed. Secondary to symptoms and exam findings, I ordered blood cultures, CBC, CMP, procalcitonin, feritin, lactic acid, lactate, BNP, C-reactive protein, CK, and rapid SARS-CoV2 RNA. EKG 12-lead ordered  and reviewed.  Differential Diagnosis:   My differential diagnosis includes, but is not limited to: COVID-19, pneumonia, sepsis, acute coronary syndrome, PE, other intraabdominal pathology.      Independently Interpreted Test(s):   I have ordered and independently interpreted EKG Reading(s) - see prior notes  Clinical Tests:   Lab Tests: Ordered and Reviewed  Radiological Study: Ordered and Reviewed  Medical Tests: Ordered and Reviewed  ED Management:  Patient placed on Venti mask with persistent tachypnea and oxygen sats of 93%. I upgraded this to a non-rebreather and the patient is much more comfortable and relaxed.    Upon re-evaluation, the patient has increased work of breathing and has an oxygen saturation of 95% on a non-rebreather. Respiratory rate of 25. Fully awake and alert.    Pt seen by Dr. Fenton, will be placed on bipap, viral filter in place, pt to be moved to negative pressure room. Will be admitted to icu.   She appears more comfortable, does become tachypneic with minimal movement.   Discussed w Dr. Galo, requested CAP treatment w rocephin, zithromax, hydroxychloroquine            Scribe Attestation:   Scribe #1: I performed the above scribed service and the documentation accurately describes the services I performed. I attest to the accuracy of the note.                          Clinical Impression:       ICD-10-CM ICD-9-CM   1. Acute respiratory distress R06.03 518.82   2. Hypoxia R09.02 799.02   3. COVID-19 virus infection U07.1          Disposition:   Disposition: Admitted     ED Disposition Condition    Admit                           Richy Person MD  04/08/20 1838       Richy Person MD  04/08/20 1839

## 2020-04-09 VITALS
OXYGEN SATURATION: 47 % | SYSTOLIC BLOOD PRESSURE: 82 MMHG | TEMPERATURE: 99 F | WEIGHT: 230 LBS | BODY MASS INDEX: 36.96 KG/M2 | DIASTOLIC BLOOD PRESSURE: 52 MMHG | HEIGHT: 66 IN

## 2020-04-09 LAB
ESTIMATED AVG GLUCOSE: 177 MG/DL (ref 68–131)
HBA1C MFR BLD HPLC: 7.8 % (ref 4–5.6)

## 2020-04-09 PROCEDURE — 63600175 PHARM REV CODE 636 W HCPCS: Performed by: FAMILY MEDICINE

## 2020-04-09 NOTE — NURSING
Respiratory Therapy in the patient's room while I began to call the ED for emergent intubation; they called a code ; the  Team to the room immediately .

## 2020-04-09 NOTE — EICU
E alert for low sats in 50tes on CPAP.  Pt was switched to CPAP from NRB for desalting to 70tes, with no improvement, now RR in 50tes, /110, HR 140es, confused.  Plan to intubate STAT. Anesthesia paged.      8.50 pm  Upon arrival of the anesthesia pt coded, PEA whole time, intubated, aspirated during intubation, received  6 epis, 6 rounds of CPR, time to ROSC aobut 30 min. Pt started waking up. Propofol qtt ordered. See code protocol for details.  /70,   No APRV mode on the vent.  ACVC with minimal improvement in sats.  Vent setting ACPC PEEP 20, IP 18, RR 26, I:E 1:2 fio2  100%  Pulling TV around 450s,sats slowly coming up to low 80ties      10.30 pm  CXR with worsening b/l extensive opacification, ETT 1.5 cm above the alhaji, no pneumothorax.  ETT to be retracted 2 cm. P/F ratio 50.  Solumedrol changed to Decadron 10 mg iv qd ordered per DEXA- [facility]DS protocol.  Sats 88%, abg pending on current settings.

## 2020-04-09 NOTE — NURSING
Pt to room 266 from ER via stretcher. O2 via venti mask O2 sat 97%. Pt able to move from stretcher to bed with assistance. Pt oriented to bed, call light and TV. Bed in low position with alarm on. Report given to oncoming shift.

## 2020-04-09 NOTE — NURSING
Confirmed with pt's daughters Michelle and Geeta and pt's sister Shakira that they want to go ahead with  Extubation and comfort measures only as discussed with Dr Vandana Suarez.

## 2020-04-09 NOTE — PROGRESS NOTES
Called for intubation in icu..also called to Labor Delivery at same time for fetal decels in 60s..emergent section called..  ICU instructed to call ER for intubation and anesthethist  Lyn sent to intubate if arriving first..

## 2020-04-09 NOTE — SUBJECTIVE & OBJECTIVE
Past Medical History:   Diagnosis Date    Asthma     Diabetes mellitus, type 2     Diabetic neuropathy     Diabetic retinopathy     Epiretinal membrane, left eye     Heart failure     Hyperlipidemia     Hypertension     Retinal detachment     Sickle cell trait     Vitreous hemorrhage of both eyes        Past Surgical History:   Procedure Laterality Date    Avastin Injection Bilateral     BREAST BIOPSY      age 16    BREAST LUMPECTOMY      right breast    BTL      CATARACT EXTRACTION W/  INTRAOCULAR LENS IMPLANT Left 3/25/2015    OS (Dr. Montes)    CATARACT EXTRACTION W/  INTRAOCULAR LENS IMPLANT Right 6/17/15    OD ()    PANRETINAL PHOTOCOAGULATION      Both eyes       Review of patient's allergies indicates:   Allergen Reactions    Lisinopril Other (See Comments)     cough       No current facility-administered medications on file prior to encounter.      Current Outpatient Medications on File Prior to Encounter   Medication Sig    albuterol (PROAIR HFA) 90 mcg/actuation inhaler Inhale 2 puffs into the lungs every 6 (six) hours as needed.    amLODIPine (NORVASC) 10 MG tablet Take 1 tablet (10 mg total) by mouth once daily.    atorvastatin (LIPITOR) 40 MG tablet Take 1 tablet (40 mg total) by mouth once daily.    carvediloL (COREG) 6.25 MG tablet TAKE 1 TABLET BY MOUTH ONCE DAILY IN THE EVENING    cloNIDine (CATAPRES) 0.1 MG tablet TAKE 1 TABLET BY MOUTH TWICE DAILY    FREESTYLE NINOSKA 14 DAY SENSOR Kit CHANGE SENSOR EVERY 14 DAYS    furosemide (LASIX) 40 MG tablet Take 1 tablet (40 mg total) by mouth 2 (two) times daily.    gabapentin (NEURONTIN) 800 MG tablet TAKE 1 TABLET BY MOUTH TWICE DAILY    insulin glargine U-300 conc (TOUJEO MAX U-300 SOLOSTAR) 300 unit/mL (3 mL) InPn Inject 45 Units into the skin once daily.    insulin lispro (HUMALOG KWIKPEN INSULIN) 100 unit/mL pen Inject 20 Units into the skin 3 (three) times daily before meals.    INVOKANA 100 mg Tab TAKE 1  "TABLET BY MOUTH ONCE DAILY    losartan (COZAAR) 100 MG tablet Take 1 tablet by mouth once daily    nortriptyline (PAMELOR) 25 MG capsule TAKE 1 CAPSULE BY MOUTH ONCE DAILY IN THE EVENING    OZEMPIC 1 mg/dose (2 mg/1.5 mL) PnIj INJECT 1 MG UNDER THE SKIN ONCE A WEEK    pantoprazole (PROTONIX) 20 MG tablet Take 1 tablet (20 mg total) by mouth once daily.    pen needle, diabetic (BD FANNIE 2ND GEN PEN NEEDLE) 32 gauge x 5/32" Ndle Use 4x/day    potassium chloride SA (K-DUR,KLOR-CON) 20 MEQ tablet Take 20 mEq by mouth 2 (two) times daily.    traMADoL (ULTRAM) 50 mg tablet TAKE 1 TABLET BY MOUTH EVERY 12 HOURS AS NEEDED     Family History     Problem Relation (Age of Onset)    Diabetes Mother, Brother    Heart attack Father    Heart failure Father (60), Mother, Brother, Sister    Hyperlipidemia Father (60), Brother    Hypertension Father (60), Mother, Brother        Tobacco Use    Smoking status: Never Smoker    Smokeless tobacco: Never Used   Substance and Sexual Activity    Alcohol use: No     Alcohol/week: 0.0 standard drinks    Drug use: No    Sexual activity: Yes     Partners: Male     Comment: . 2 children. works as a .     Review of Systems   Constitutional: Positive for fatigue and fever. Negative for chills.   HENT: Negative for sore throat.    Eyes: Negative for visual disturbance.   Respiratory: Positive for cough and shortness of breath.    Cardiovascular: Negative for chest pain and leg swelling.   Gastrointestinal: Positive for diarrhea. Negative for abdominal pain, nausea and vomiting.   Genitourinary: Negative for dysuria.   Musculoskeletal: Negative for neck pain.   Skin: Negative for rash.   Neurological: Positive for weakness.   Hematological: Does not bruise/bleed easily.   Psychiatric/Behavioral: Negative for confusion.     Objective:     Vital Signs (Most Recent):  Temp: 99.3 °F (37.4 °C) (04/08/20 1826)  Pulse: (!) 113 (04/08/20 1900)  Resp: (!) 30 (04/08/20 " 1900)  BP: (!) 144/69 (04/08/20 1900)  SpO2: 96 % (04/08/20 1900) Vital Signs (24h Range):  Temp:  [99.3 °F (37.4 °C)-103 °F (39.4 °C)] 99.3 °F (37.4 °C)  Pulse:  [104-125] 113  Resp:  [23-31] 30  SpO2:  [93 %-100 %] 96 %  BP: (132-159)/(68-79) 144/69     Weight: 104.3 kg (230 lb)  Body mass index is 37.12 kg/m².    Physical Exam   Constitutional: She is oriented to person, place, and time. She appears well-developed.   Obese   HENT:   Head: Normocephalic and atraumatic.   Nasal cannula in place   Eyes: Conjunctivae and EOM are normal.   Neck: Normal range of motion. Neck supple.   Cardiovascular: Regular rhythm.   Tachycardic, slightly distant   Pulmonary/Chest:   Diminished breath sounds bilaterally throughout, mild expiratory wheeze, tachypneic, able speak in short sentences, currently no use of accessory muscles for respiration but respirations are more shallow and slight increased work with respiration   Abdominal: Soft. Bowel sounds are normal. She exhibits no distension and no mass. There is no tenderness. There is no rebound and no guarding.   Musculoskeletal: Normal range of motion. She exhibits no edema.   Neurological: She is alert and oriented to person, place, and time.   Skin: Skin is warm. Capillary refill takes less than 2 seconds. She is not diaphoretic.         CRANIAL NERVES     CN III, IV, VI   Extraocular motions are normal.        Significant Labs: All pertinent labs within the past 24 hours have been reviewed.    Significant Imaging: I have reviewed and interpreted all pertinent imaging results/findings within the past 24 hours.

## 2020-04-09 NOTE — NURSING
[facility] ; placed a call to anesthesia and spoke with Dr Garvin who states he has an emergent section now and will call us back in 5 minutes; he states the ED may need to come do it.

## 2020-04-09 NOTE — H&P
"[facility] Medicine  History & Physical    Patient Name: Michelle Donohue  MRN: [id number]  Admission Date: 4/8/2020  Attending Physician: Ryann Galo MD   Primary Care Provider: Rosalie Brady MD         Patient information was obtained from patient, past medical records and ER records.     Subjective:     Principal Problem:Acute hypoxemic respiratory failure    Chief Complaint:   Chief Complaint   Patient presents with    Cough     EMS reports shortness of breath, cough and bloody tinged sputum with fevers. symptoms x 5 days.         HPI: 89-year-old female with CHF, HTN, HLP, DM2, asthma presented with worsening shortness of breath with cough for approximately 3-5 days.  She reports new onset of fevers today with acute worsening of her respiratory status.  She has no known sick contacts.  Does report some diarrhea 2 days as well, and cough has been productive since today with thick yellow tinged sputum.  She has been mainly at home but has multiple family members living with her, all of them who are not ill. She reports this is different from her previous episodes of asthma exacerbation in the past.  Much worse with associated weakness and fatigue.  No reported chest pain, nausea or vomiting.  No diaphoresis.  Upon EMS arrival, her sats were 80% on room air.  In the ER, workup remarkable for positive COVID-19 test and chest x-ray with "bilateral ground-glass pulmonary opacities with somewhat peripheral and basilar distribution.  The pattern is most suggestive of viral pneumonia."  WBC normal, ferritin 987, troponin negative, lactic acid 1.7, procalcitonin 0.08, influenza screen negative.  Patient was ordered for BiPAP.    Past Medical History:   Diagnosis Date    Asthma     Diabetes mellitus, type 2     Diabetic neuropathy     Diabetic retinopathy     Epiretinal membrane, left eye     Heart failure     Hyperlipidemia     Hypertension     Retinal detachment     Sickle cell trait "     Vitreous hemorrhage of both eyes        Past Surgical History:   Procedure Laterality Date    Avastin Injection Bilateral     BREAST BIOPSY      age 16    BREAST LUMPECTOMY      right breast    BTL      CATARACT EXTRACTION W/  INTRAOCULAR LENS IMPLANT Left 3/25/2015    OS (Dr. Montes)    CATARACT EXTRACTION W/  INTRAOCULAR LENS IMPLANT Right 6/17/15    OD ()    PANRETINAL PHOTOCOAGULATION      Both eyes       Review of patient's allergies indicates:   Allergen Reactions    Lisinopril Other (See Comments)     cough       No current facility-administered medications on file prior to encounter.      Current Outpatient Medications on File Prior to Encounter   Medication Sig    albuterol (PROAIR HFA) 90 mcg/actuation inhaler Inhale 2 puffs into the lungs every 6 (six) hours as needed.    amLODIPine (NORVASC) 10 MG tablet Take 1 tablet (10 mg total) by mouth once daily.    atorvastatin (LIPITOR) 40 MG tablet Take 1 tablet (40 mg total) by mouth once daily.    carvediloL (COREG) 6.25 MG tablet TAKE 1 TABLET BY MOUTH ONCE DAILY IN THE EVENING    cloNIDine (CATAPRES) 0.1 MG tablet TAKE 1 TABLET BY MOUTH TWICE DAILY    FREESTYLE NINOSKA 14 DAY SENSOR Kit CHANGE SENSOR EVERY 14 DAYS    furosemide (LASIX) 40 MG tablet Take 1 tablet (40 mg total) by mouth 2 (two) times daily.    gabapentin (NEURONTIN) 800 MG tablet TAKE 1 TABLET BY MOUTH TWICE DAILY    insulin glargine U-300 conc (TOUJEO MAX U-300 SOLOSTAR) 300 unit/mL (3 mL) InPn Inject 45 Units into the skin once daily.    insulin lispro (HUMALOG KWIKPEN INSULIN) 100 unit/mL pen Inject 20 Units into the skin 3 (three) times daily before meals.    INVOKANA 100 mg Tab TAKE 1 TABLET BY MOUTH ONCE DAILY    losartan (COZAAR) 100 MG tablet Take 1 tablet by mouth once daily    nortriptyline (PAMELOR) 25 MG capsule TAKE 1 CAPSULE BY MOUTH ONCE DAILY IN THE EVENING    OZEMPIC 1 mg/dose (2 mg/1.5 mL) PnIj INJECT 1 MG UNDER THE SKIN ONCE A WEEK     "pantoprazole (PROTONIX) 20 MG tablet Take 1 tablet (20 mg total) by mouth once daily.    pen needle, diabetic (BD FANNIE 2ND GEN PEN NEEDLE) 32 gauge x 5/32" Ndle Use 4x/day    potassium chloride SA (K-DUR,KLOR-CON) 20 MEQ tablet Take 20 mEq by mouth 2 (two) times daily.    traMADoL (ULTRAM) 50 mg tablet TAKE 1 TABLET BY MOUTH EVERY 12 HOURS AS NEEDED     Family History     Problem Relation (Age of Onset)    Diabetes Mother, Brother    Heart attack Father    Heart failure Father (60), Mother, Brother, Sister    Hyperlipidemia Father (60), Brother    Hypertension Father (60), Mother, Brother        Tobacco Use    Smoking status: Never Smoker    Smokeless tobacco: Never Used   Substance and Sexual Activity    Alcohol use: No     Alcohol/week: 0.0 standard drinks    Drug use: No    Sexual activity: Yes     Partners: Male     Comment: . 2 children. works as a .     Review of Systems   Constitutional: Positive for fatigue and fever. Negative for chills.   HENT: Negative for sore throat.    Eyes: Negative for visual disturbance.   Respiratory: Positive for cough and shortness of breath.    Cardiovascular: Negative for chest pain and leg swelling.   Gastrointestinal: Positive for diarrhea. Negative for abdominal pain, nausea and vomiting.   Genitourinary: Negative for dysuria.   Musculoskeletal: Negative for neck pain.   Skin: Negative for rash.   Neurological: Positive for weakness.   Hematological: Does not bruise/bleed easily.   Psychiatric/Behavioral: Negative for confusion.     Objective:     Vital Signs (Most Recent):  Temp: 99.3 °F (37.4 °C) (04/08/20 1826)  Pulse: (!) 113 (04/08/20 1900)  Resp: (!) 30 (04/08/20 1900)  BP: (!) 144/69 (04/08/20 1900)  SpO2: 96 % (04/08/20 1900) Vital Signs (24h Range):  Temp:  [99.3 °F (37.4 °C)-103 °F (39.4 °C)] 99.3 °F (37.4 °C)  Pulse:  [104-125] 113  Resp:  [23-31] 30  SpO2:  [93 %-100 %] 96 %  BP: (132-159)/(68-79) 144/69     Weight: 104.3 kg (230 " lb)  Body mass index is 37.12 kg/m².    Physical Exam   Constitutional: She is oriented to person, place, and time. She appears well-developed.   Obese   HENT:   Head: Normocephalic and atraumatic.   BiPAPin place   Eyes: Conjunctivae and EOM are normal.   Neck: Normal range of motion. Neck supple.   Cardiovascular: Regular rhythm.   Tachycardic, slightly distant   Pulmonary/Chest:   Diminished breath sounds bilaterally throughout, mild expiratory wheeze, tachypneic, able speak in short sentences, currently no use of accessory muscles for respiration but respirations are more shallow and slight increased work with respiration   Abdominal: Soft. Bowel sounds are normal. She exhibits no distension and no mass. There is no tenderness. There is no rebound and no guarding.   Musculoskeletal: Normal range of motion. She exhibits no edema.   Neurological: She is alert and oriented to person, place, and time.   Skin: Skin is warm. Capillary refill takes less than 2 seconds. She is not diaphoretic.         CRANIAL NERVES     CN III, IV, VI   Extraocular motions are normal.        Significant Labs: All pertinent labs within the past 24 hours have been reviewed.    Significant Imaging: I have reviewed and interpreted all pertinent imaging results/findings within the past 24 hours.    Assessment/Plan:     * Acute hypoxemic respiratory failure  Presentation consistent with acute respiratory failure secondary to COVID 19  COVID-19 test positive, and flu screen negative  Treat empirically with Rocephin and azithromycin, and hydroxychloroquine  Isolation protocol in place  BIPAP ordered, and patient tolerating BiPAP trial  Some component of asthma exacerbation with wheezing noted on exam  Will add IV Solu-Medrol 40 mg IV q.12  Case discussed with Pulmonary and appreciate their input  Continue monitor closely ICU and have BiPAP on order  Patient agreeable for intubation if needed    COVID-19 virus infection  COVID-19 test  positive  As discussed above    Transaminitis  Likely secondary to COVID-19  Will continue monitor    Uncontrolled type 2 diabetes mellitus with stage 3 chronic kidney disease  Hemoglobin A1C   Date Value Ref Range Status   12/19/2019 8.2 (H) 4.0 - 5.6 % Final   Patient with poor control diabetes long standing elevated hemoglobin A1cs the past  Hold outpatient regimen  Glucose goal during hospitalization between 140-180  Will start basal insulin with sliding scale  Will check a hemoglobin A1c and make further adjustments to insulin regimen as necessary    Essential hypertension  Hold anti-HTN medications for now and resume medications BP tolerates    Sickle cell trait  Not an active issue    Chronic obstructive airway disease with asthma  As discussed above  Steroids added    Chronic systolic congestive heart failure  Last nuclear stress test in 2015 with EF of 43% documented  Clinically compensated at this time      VTE Risk Mitigation (From admission, onward)         Ordered     IP VTE HIGH RISK PATIENT  Once      04/08/20 1716     Place DINORA hose  Until discontinued      04/08/20 1716              Critical care time spent on the evaluation and treatment of severe organ dysfunction, review of pertinent labs and imaging studies, discussions with consulting providers and discussions with patient/family: 90 minutes.     Ryann Galo MD  Department of Hospital Medicine   [facility]

## 2020-04-09 NOTE — NURSING
"Pt on 15 L nonrebreather mask  at shift change ; RT placed the pt on bipap as ordered about 1935 ; the patient was tolerating until 1940 , when she became very anxious , and began breathing much faster/ sitting up in bed stating she " can't breathe."  We tried to calm her when she began desaturating on the monitor. I immediately pushed the eICU button in the room to ask for assistance and an emergent intubation order; the RN came on the screen, then shortly after Dr Millan was on the screen assisting with patient care ,and giving orders for this patient; she decided on intubation for this patient, so I left to call anesthesia, as protocol here, for the intubation, while 2 respiratory therapists remained in the room along with Dr Millan on the screen; I then immediately spoke with Dr Garvin regarding the  emergent intubation order, and the  patient coded; the team was immediately to the room when the code was called.    "

## 2020-04-09 NOTE — HPI
"89-year-old female with CHF, HTN, HLP, DM2, asthma presented with worsening shortness of breath with cough for approximately 3-5 days.  She reports new onset of fevers today with acute worsening of her respiratory status.  She has no known sick contacts.  Does report some diarrhea 2 days as well, and cough has been productive since today with thick yellow tinged sputum.  She has been mainly at home but has multiple family members living with her, all of them who are not ill. She reports this is different from her previous episodes of asthma exacerbation in the past.  Much worse with associated weakness and fatigue.  No reported chest pain, nausea or vomiting.  No diaphoresis.  Upon EMS arrival, her sats were 80% on room air.  In the ER, workup remarkable for positive COVID-19 test and chest x-ray with "bilateral ground-glass pulmonary opacities with somewhat peripheral and basilar distribution.  The pattern is most suggestive of viral pneumonia."  WBC normal, ferritin 987, troponin negative, lactic acid 1.7, procalcitonin 0.08, influenza screen negative.  Patient was ordered for BiPAP in the ER which she initially refused and was stable on 5 L via nasal cannula.  "

## 2020-04-09 NOTE — EICU
Rounding (Video Assessment):  Yes    Intervention Initiated From:  Bedside    Charan Communicated with Bedside Nurse regarding:  Respiratory    Nurse Notified:  Yes    Doctor Notified:  Yes    Comments: nurse elert regarding patients respiratory status declining;  notified

## 2020-04-09 NOTE — ANESTHESIA PROCEDURE NOTES
Intubation  Performed by: Lyn Tubbs CRNA  Authorized by: Lyn Tubbs CRNA     Intubation:     Induction:  Inhalational - ETT/trach    Intubated:  N/a (code blue cardiac arrest)    Mask Ventilation:  N/a    Attempts:  1    Attempted By:  CRNA    Method of Intubation:  Video laryngoscopy and other (see comments) (glidescope)    Blade:  Kishor 3    Laryngeal View Grade: Grade I - full view of chords      Difficult Airway Encountered?: No      Complications:  Reflex of gastric contents - possible aspiration    Airway Device:  Oral endotracheal tube    Airway Device Size:  7.0    Style/Cuff Inflation:  Cuffed    Inflation Amount (mL):  4    Tube secured:  24    Secured at:  The lips    Placement Verified By:  Colorimetric ETCO2 device and other (see comments) (auscultation bilateral breath sounds)    Complicating Factors:  Obesity    Findings Post-Intubation:  BS equal bilateral  Notes:      At approximately 1955 responded to cardiac arrest , pt noted to be apneic, O2 sats 50%, with noted aspirate around mouth and nose. Intubation complicated by aspirate, successful x1 attempt, verified by ETCO2 color change, bilateral breath sounds auscultated by ER MD.

## 2020-04-09 NOTE — ASSESSMENT & PLAN NOTE
Hemoglobin A1C   Date Value Ref Range Status   12/19/2019 8.2 (H) 4.0 - 5.6 % Final   Patient with poor control diabetes long standing elevated hemoglobin A1cs the past  Hold outpatient regimen  Glucose goal during hospitalization between 140-180  Will start basal insulin with sliding scale  Will check a hemoglobin A1c and make further adjustments to insulin regimen as necessary

## 2020-04-09 NOTE — CARE UPDATE
Family arrived at bedside.  Had a discussion with Patients daughters (next of kin) Geeta Martinez and Patient's sister Shakira.    Discussed current scenario, prognosis, and established goals of care.  Family would like to go forward with comfort extubation.  Updated RN and RT at bedside.  Will place comfort orders.

## 2020-04-09 NOTE — SIGNIFICANT EVENT
Called to pronounce patient.  Pt was unresponsive to name or sternal rub, had no respirations, no pulse, no corneal reflex, and pupils were fixed/dilated.      Time of death: 11:50pm

## 2020-04-09 NOTE — SIGNIFICANT EVENT
Code blue called, patient found to be in PEA.  Cardiac arrest likely stemming from respiratory failure.  Multiple rounds of epi were given, ACLS protocol was followed for about 25-30 minutes (see code sheet).  Pt did have a palpable pulse.  Pt was intubated during the code and put on vent.  Despite being on vent patient's oxygen saturation remained critically low 50-60%.  Called patients sister who was listed, and she notified me that the patient has 2 daughters who are the next of Kin (Michelle and Geeta).  Pt is not .  Called Michelle, who is the oldest daughter and gave her an update and my concerns that if oxygen saturation doesn't come up then I recommend withdrawing care as  pt can have vital organ damage including anoxic brain injury.      Michelle to talk to sister and aunt and call us back.    Stat CXR ordered to make sure patient didn't have any lung collpase

## 2020-04-09 NOTE — ASSESSMENT & PLAN NOTE
Presentation consistent with acute respiratory failure secondary to COVID 19  COVID-19 test positive, and flu screen negative  Treat empirically with Rocephin and azithromycin, and hydroxychloroquine  Isolation protocol in place  BIPAP ordered, but patient has refused to use BiPAP  Currently stable on 5 L via nasal cannula with sats at 80%  Some component of asthma exacerbation with wheezing noted on exam  Will add IV Solu-Medrol 40 mg IV q.12  Case discussed with Pulmonary and appreciate their input  Continue monitor closely ICU and have BiPAP on order  Patient agreeable for intubation if needed

## 2020-04-10 PROBLEM — I46.9 CARDIAC ARREST: Status: ACTIVE | Noted: 2020-04-10

## 2020-04-10 NOTE — HOSPITAL COURSE
Ms. Donohue was admitted with acute respiratory failure secondary to COVID 19 infection. Empirically treated with Rocephin, azithromycin and hydroxychloroquine and supported with BIPAP. She was admitted to the ICU for close monitoring. Solumedrol was added to her regimen given wheezing on exam and likely some component of asthma exacerbation contributing to her respiratory decompensation. Pulmonary was consulted and followed. Later after admission, a Code Blue was called, and patient found to be in PEA.  Multiple rounds of Epi were given, ACLS protocol was followed for about 25-30 minutes and she had ROSC.  Pt was intubated during the code and placed on vent.  Despite being on vent, patient's oxygen saturation remained critically low 50-60%.  Dr. Suarez called patient's sister who was listed, and she notified him that the patient has 2 daughters who are the next of Kin (Michelle and Geeta).  Michelle was called, who is the oldest daughter. She was updated on her mom's status. Patient remained difficult to oxygenate and there was concern for anoxic brain injury. Recommendation for withdrawing care was given and family was agreeable.  They visited prior to withdrawal of care and comfort measure were in place.  Time of death was 11:50pm on April 8. 2020.

## 2020-04-10 NOTE — DISCHARGE SUMMARY
"[facility] Medicine  Discharge Summary      Patient Name: Michelle Donohue  MRN: [id number]  Admission Date: 4/8/2020  Hospital Length of Stay: 1 days  Discharge Date and Time: 4/9/2020  3:01 AM  Attending Physician: Ryann Galo MD  Discharging Provider: Ryann Galo MD  Primary Care Provider: Rosalie Brady MD      HPI:   89-year-old female with CHF, HTN, HLP, DM2, asthma presented with worsening shortness of breath with cough for approximately 3-5 days.  She reports new onset of fevers today with acute worsening of her respiratory status.  She has no known sick contacts.  Does report some diarrhea 2 days as well, and cough has been productive since today with thick yellow tinged sputum.  She has been mainly at home but has multiple family members living with her, all of them who are not ill. She reports this is different from her previous episodes of asthma exacerbation in the past.  Much worse with associated weakness and fatigue.  No reported chest pain, nausea or vomiting.  No diaphoresis.  Upon EMS arrival, her sats were 80% on room air.  In the ER, workup remarkable for positive COVID-19 test and chest x-ray with "bilateral ground-glass pulmonary opacities with somewhat peripheral and basilar distribution.  The pattern is most suggestive of viral pneumonia."  WBC normal, ferritin 987, troponin negative, lactic acid 1.7, procalcitonin 0.08, influenza screen negative.  Patient was ordered for BiPAP in the ER which she initially refused and was stable on 5 L via nasal cannula.    * No surgery found *      Hospital Course:   Ms. Donohue was admitted with acute respiratory failure secondary to COVID 19 infection. Empirically treated with Rocephin, azithromycin and hydroxychloroquine and supported with BIPAP. She was admitted to the ICU for close monitoring. Solumedrol was added to her regimen given wheezing on exam and likely some component of asthma exacerbation contributing to her " respiratory decompensation. Pulmonary was consulted and followed. Later after admission, a Code Blue was called, and patient found to be in PEA.  Multiple rounds of Epi were given, ACLS protocol was followed for about 25-30 minutes and she had ROSC.  Pt was intubated during the code and placed on vent.  Despite being on vent, patient's oxygen saturation remained critically low 50-60%.   Dr. Suarez called patient's sister who was listed, and she notified him that the patient has 2 daughters who are the next of Kin (Michelle and Geeta).  Michelle was called, who is the oldest daughter. She was updated on her mom's status. Patient remained difficult to oxygenate and there was concern for anoxic brain injury. Recommendation for withdrawing care was given and family was agreeable.  They visited prior to withdrawal of care and comfort measure were in place.  Time of death was 11:50pm on April 8. 2020.     Consults:   Consults (From admission, onward)        Status Ordering Provider     Inpatient consult to PICC team (NIAS)  Once     Provider:  (Not yet assigned)    Completed YENI AKBAR     Inpatient consult to Pulmonology  Once     Provider:  Terry Fenton MD    Completed YENI AKBAR     Inpatient consult to Pulmonology  Once     Provider:  Terry Fenton MD    Completed JUN ALDRICH        Service: Hospital Medicine    Final Active Diagnoses:    Diagnosis Date Noted POA    PRINCIPAL PROBLEM:  Acute hypoxemic respiratory failure [J96.01] 04/08/2020 Yes    COVID-19 virus infection [U07.1] 04/08/2020 Yes    Cardiac arrest [I46.9] 04/10/2020 No    Transaminitis [R74.0] 04/08/2020 Yes    Comfort measures only status [Z51.5] 04/08/2020 Not Applicable    Uncontrolled type 2 diabetes mellitus with stage 3 chronic kidney disease [E11.22, N18.3, E11.65] 08/21/2014 Yes     Chronic    Essential hypertension [I10] 02/27/2013 Yes     Chronic    Sickle cell trait [D57.3] 07/23/2012 Yes     Chronic     Chronic obstructive airway disease with asthma [J44.9] 2012 Yes     Chronic    Chronic systolic congestive heart failure [I50.22] 2012 Yes      Problems Resolved During this Admission:       Discharged Condition:     Disposition:     Follow Up: N/A    Patient Instructions:   No discharge procedures on file.    Significant Diagnostic Studies:     Pending Diagnostic Studies:     None         Medications:  None (patient  at medical facility)    Indwelling Lines/Drains at time of discharge:   Lines/Drains/Airways     Peripherally Inserted Central Catheter Line            PICC Triple Lumen 20 1630 right basilic 1 day          Drain            Female External Urinary Catheter 20 1900 1 day          Airway                 Airway - Non-Surgical 20 2032 1 day                Time spent on the discharge of patient: 35 minutes      Ryann Galo MD  Department of Hospital Medicine  [facility]

## 2020-04-12 LAB
BACTERIA BLD CULT: NORMAL
BACTERIA BLD CULT: NORMAL

## 2020-04-15 NOTE — PHYSICIAN QUERY
"PT Name: Michelle Donohue  MR #: [id number]     Diagnosis Clarification      CDS/: Kayleen Hollins RN, CDS               Contact information: [email or web address]      This form is a permanent document in the medical record.     Query Date: April 15, 2020    Dear Provider,  By submitting this query, we are merely seeking further clarification of documentation.  Please utilize your independent clinical judgment when addressing the question(s) below.     The medical record contains the following:    Supporting Clinical Information Location in Medical Record   presented with worsening shortness of breath with cough for approximately 3-5 days.  She reports new onset of fevers today with acute worsening of her respiratory status...her sats were 80% on room air.  In the ER, workup remarkable for positive COVID-19 test and chest x-ray with "bilateral ground-glass pulmonary opacities with somewhat peripheral and basilar distribution.  The pattern is most suggestive of viral pneumonia."  WBC normal, ferritin 987, troponin negative, lactic acid 1.7, procalcitonin 0.08, influenza screen negative.  Patient was ordered for BiPAP.    COVID-19 test positive    Treat empirically with Rocephin and azithromycin, and hydroxychloroquine     There is significant interval worsening appearance of the lungs with extensive consolidative change seen throughout the right lung.  Mild worsening left perihilar opacification/consolidation is also seen.  Support pads overlie the left chest wall.  No pneumothorax seen.    H&P 4/8                       H&P 4/8     H&P 4/8       CXR 4/8      Please clarify if the __viral pneumonia__ diagnosis has been:    [ X ] Ruled In   [  ] Ruled Out   [  ] Other/Clarification of findings (please specify)_______________    [   ] Clinically undetermined       Present on admission (POA) status:   [ x  ] Yes (Y)                          [  ] Clinically Undetermined (W)  [   ] No (N)                            [   " ] Documentation insufficient to determine if condition is POA (U)       Please document in your progress notes daily for the duration of treatment, until resolved, and include in your discharge summary.

## 2022-06-07 NOTE — TELEPHONE ENCOUNTER
According to attending nurse, Nataliya Chandler called and accepted pt when [facility] called this writer. Nurse spoke with family who chose [facility], accepting , Dr. Ishaan Chang. Emmy/Juice notified. Patient inquiring about Ultra sound of neck results. Please advise.

## 2023-10-17 NOTE — TELEPHONE ENCOUNTER
LOV 10/11/2017.    
Patient notified that refills are available at the pharmacy for all requested medications except ferrous sulfate and tramadol and was advised that Dr. Brady states an appointment is required for additional refills.  Patient also advised of importance of checking her [facility] messages and that email or text alerts of new messages may be helpful.  Verbalized understanding of information provided and states that she will call back to schedule an appointment.  
She needs an appt  
done

## 2024-09-21 NOTE — TELEPHONE ENCOUNTER
----- Message from Suzan Limon sent at 2/14/2019 10:36 AM CST -----  Contact: arik/ Amina/ [phone]  [facility] would like to know diagnostic code and plan of care for patients medication traMADol (ULTRAM) 50 mg tablet.  Thank you   clear

## 2024-11-01 NOTE — TELEPHONE ENCOUNTER
Pt informed of refill sent in for 1 month and need for OV for anymore refills; verbalized understanding; states will call back to schedule   Detail Level: Detailed